# Patient Record
Sex: MALE | Race: WHITE | NOT HISPANIC OR LATINO | Employment: OTHER | URBAN - METROPOLITAN AREA
[De-identification: names, ages, dates, MRNs, and addresses within clinical notes are randomized per-mention and may not be internally consistent; named-entity substitution may affect disease eponyms.]

---

## 2017-06-24 ENCOUNTER — HOSPITAL ENCOUNTER (INPATIENT)
Facility: HOSPITAL | Age: 52
LOS: 16 days | Discharge: HOME | DRG: 175 | End: 2017-07-10
Attending: EMERGENCY MEDICINE | Admitting: ANESTHESIOLOGY

## 2017-06-24 ENCOUNTER — APPOINTMENT (INPATIENT)
Dept: CT IMAGING | Facility: HOSPITAL | Age: 52
DRG: 175 | End: 2017-06-24

## 2017-06-24 ENCOUNTER — APPOINTMENT (EMERGENCY)
Dept: CT IMAGING | Facility: HOSPITAL | Age: 52
DRG: 175 | End: 2017-06-24

## 2017-06-24 ENCOUNTER — APPOINTMENT (EMERGENCY)
Dept: RADIOLOGY | Facility: HOSPITAL | Age: 52
DRG: 175 | End: 2017-06-24

## 2017-06-24 DIAGNOSIS — F10.10 ETOH ABUSE: ICD-10-CM

## 2017-06-24 DIAGNOSIS — E87.6 HYPOKALEMIA: ICD-10-CM

## 2017-06-24 DIAGNOSIS — J18.9 ATYPICAL PNEUMONIA: ICD-10-CM

## 2017-06-24 DIAGNOSIS — D64.9 ANEMIA: ICD-10-CM

## 2017-06-24 DIAGNOSIS — F10.231 ALCOHOL WITHDRAWAL SYNDROME, WITH DELIRIUM (HCC): ICD-10-CM

## 2017-06-24 DIAGNOSIS — I26.99 PE (PULMONARY THROMBOEMBOLISM) (HCC): ICD-10-CM

## 2017-06-24 DIAGNOSIS — I26.99 PULMONARY EMBOLUS, RIGHT (HCC): ICD-10-CM

## 2017-06-24 DIAGNOSIS — R09.02 HYPOXIA: ICD-10-CM

## 2017-06-24 DIAGNOSIS — R55 SYNCOPE AND COLLAPSE: Primary | ICD-10-CM

## 2017-06-24 LAB
ABO GROUP BLD: NORMAL
ALBUMIN SERPL BCP-MCNC: 2.9 G/DL (ref 3.5–5)
ALP SERPL-CCNC: 249 U/L (ref 46–116)
ALT SERPL W P-5'-P-CCNC: 43 U/L (ref 12–78)
AMMONIA PLAS-SCNC: 28 UMOL/L (ref 11–35)
AMPHETAMINES SERPL QL SCN: NEGATIVE
ANION GAP SERPL CALCULATED.3IONS-SCNC: 12 MMOL/L (ref 4–13)
ANION GAP SERPL CALCULATED.3IONS-SCNC: 15 MMOL/L (ref 4–13)
APTT PPP: 30 SECONDS (ref 23–35)
AST SERPL W P-5'-P-CCNC: 193 U/L (ref 5–45)
BACTERIA UR QL AUTO: ABNORMAL /HPF
BARBITURATES UR QL: NEGATIVE
BASOPHILS # BLD AUTO: 0.02 THOUSANDS/ΜL (ref 0–0.1)
BASOPHILS NFR BLD AUTO: 0 % (ref 0–1)
BENZODIAZ UR QL: NEGATIVE
BILIRUB DIRECT SERPL-MCNC: 0.76 MG/DL (ref 0–0.2)
BILIRUB SERPL-MCNC: 1.4 MG/DL (ref 0.2–1)
BILIRUB UR QL STRIP: ABNORMAL
BLD GP AB SCN SERPL QL: NEGATIVE
BLD SMEAR INTERP: NORMAL
BUN SERPL-MCNC: 12 MG/DL (ref 5–25)
BUN SERPL-MCNC: 12 MG/DL (ref 5–25)
CALCIUM SERPL-MCNC: 7.7 MG/DL (ref 8.3–10.1)
CALCIUM SERPL-MCNC: 8.6 MG/DL (ref 8.3–10.1)
CHLORIDE SERPL-SCNC: 92 MMOL/L (ref 100–108)
CHLORIDE SERPL-SCNC: 97 MMOL/L (ref 100–108)
CLARITY UR: CLEAR
CO2 SERPL-SCNC: 26 MMOL/L (ref 21–32)
CO2 SERPL-SCNC: 31 MMOL/L (ref 21–32)
COCAINE UR QL: NEGATIVE
COLOR UR: YELLOW
CREAT SERPL-MCNC: 0.74 MG/DL (ref 0.6–1.3)
CREAT SERPL-MCNC: 0.9 MG/DL (ref 0.6–1.3)
EOSINOPHIL # BLD AUTO: 0.02 THOUSAND/ΜL (ref 0–0.61)
EOSINOPHIL NFR BLD AUTO: 0 % (ref 0–6)
ERYTHROCYTE [DISTWIDTH] IN BLOOD BY AUTOMATED COUNT: 28.4 % (ref 11.6–15.1)
ETHANOL SERPL-MCNC: 197 MG/DL (ref 0–3)
FIBRINOGEN PPP-MCNC: 437 MG/DL (ref 227–495)
GFR SERPL CREATININE-BSD FRML MDRD: >60 ML/MIN/1.73SQ M
GFR SERPL CREATININE-BSD FRML MDRD: >60 ML/MIN/1.73SQ M
GLUCOSE SERPL-MCNC: 108 MG/DL (ref 65–140)
GLUCOSE SERPL-MCNC: 111 MG/DL (ref 65–140)
GLUCOSE SERPL-MCNC: 112 MG/DL (ref 65–140)
GLUCOSE SERPL-MCNC: 114 MG/DL (ref 65–140)
GLUCOSE UR STRIP-MCNC: NEGATIVE MG/DL
HCT VFR BLD AUTO: 17.5 % (ref 36.5–49.3)
HGB BLD-MCNC: 5.8 G/DL (ref 12–17)
HGB BLD-MCNC: 7.7 G/DL (ref 12–17)
HGB UR QL STRIP.AUTO: NEGATIVE
HYALINE CASTS #/AREA URNS LPF: ABNORMAL /LPF
INR PPP: 1 (ref 0.86–1.16)
KETONES UR STRIP-MCNC: ABNORMAL MG/DL
LACTATE SERPL-SCNC: 1.9 MMOL/L (ref 0.5–2)
LACTATE SERPL-SCNC: 2.4 MMOL/L (ref 0.5–2)
LDH SERPL-CCNC: 292 U/L (ref 81–234)
LEUKOCYTE ESTERASE UR QL STRIP: NEGATIVE
LIPASE SERPL-CCNC: 438 U/L (ref 73–393)
LYMPHOCYTES # BLD AUTO: 1.02 THOUSANDS/ΜL (ref 0.6–4.47)
LYMPHOCYTES NFR BLD AUTO: 14 % (ref 14–44)
MAGNESIUM SERPL-MCNC: 1 MG/DL (ref 1.6–2.6)
MAGNESIUM SERPL-MCNC: 2.5 MG/DL (ref 1.6–2.6)
MCH RBC QN AUTO: 27.9 PG (ref 26.8–34.3)
MCHC RBC AUTO-ENTMCNC: 33.1 G/DL (ref 31.4–37.4)
MCV RBC AUTO: 84 FL (ref 82–98)
METHADONE UR QL: NEGATIVE
MONOCYTES # BLD AUTO: 0.91 THOUSAND/ΜL (ref 0.17–1.22)
MONOCYTES NFR BLD AUTO: 12 % (ref 4–12)
NEUTROPHILS # BLD AUTO: 5.4 THOUSANDS/ΜL (ref 1.85–7.62)
NEUTS SEG NFR BLD AUTO: 72 % (ref 43–75)
NITRITE UR QL STRIP: NEGATIVE
NON-SQ EPI CELLS URNS QL MICRO: ABNORMAL /HPF
NRBC BLD AUTO-RTO: 1 /100 WBCS
NT-PROBNP SERPL-MCNC: 417 PG/ML
OPIATES UR QL SCN: NEGATIVE
PCP UR QL: NEGATIVE
PH UR STRIP.AUTO: 6.5 [PH] (ref 4.5–8)
PHOSPHATE SERPL-MCNC: 4 MG/DL (ref 2.7–4.5)
PLATELET # BLD AUTO: 280 THOUSANDS/UL (ref 149–390)
PMV BLD AUTO: 11.4 FL (ref 8.9–12.7)
POTASSIUM SERPL-SCNC: 2.4 MMOL/L (ref 3.5–5.3)
POTASSIUM SERPL-SCNC: 2.9 MMOL/L (ref 3.5–5.3)
PROT SERPL-MCNC: 6.9 G/DL (ref 6.4–8.2)
PROT UR STRIP-MCNC: ABNORMAL MG/DL
PROTHROMBIN TIME: 13.4 SECONDS (ref 12.1–14.4)
RBC # BLD AUTO: 2.08 MILLION/UL (ref 3.88–5.62)
RBC #/AREA URNS AUTO: ABNORMAL /HPF
RH BLD: POSITIVE
SODIUM SERPL-SCNC: 135 MMOL/L (ref 136–145)
SODIUM SERPL-SCNC: 138 MMOL/L (ref 136–145)
SP GR UR STRIP.AUTO: <=1.005 (ref 1–1.03)
SPECIMEN EXPIRATION DATE: NORMAL
THC UR QL: NEGATIVE
TROPONIN I SERPL-MCNC: 0.02 NG/ML
TROPONIN I SERPL-MCNC: 0.02 NG/ML
TROPONIN I SERPL-MCNC: 0.03 NG/ML
TSH SERPL DL<=0.05 MIU/L-ACNC: 0.9 UIU/ML (ref 0.36–3.74)
UROBILINOGEN UR QL STRIP.AUTO: 4 E.U./DL
WBC # BLD AUTO: 7.5 THOUSAND/UL (ref 4.31–10.16)
WBC #/AREA URNS AUTO: ABNORMAL /HPF

## 2017-06-24 PROCEDURE — 85610 PROTHROMBIN TIME: CPT | Performed by: EMERGENCY MEDICINE

## 2017-06-24 PROCEDURE — 83615 LACTATE (LD) (LDH) ENZYME: CPT | Performed by: NURSE PRACTITIONER

## 2017-06-24 PROCEDURE — 83605 ASSAY OF LACTIC ACID: CPT | Performed by: NURSE PRACTITIONER

## 2017-06-24 PROCEDURE — 84100 ASSAY OF PHOSPHORUS: CPT | Performed by: EMERGENCY MEDICINE

## 2017-06-24 PROCEDURE — 83735 ASSAY OF MAGNESIUM: CPT | Performed by: EMERGENCY MEDICINE

## 2017-06-24 PROCEDURE — 96360 HYDRATION IV INFUSION INIT: CPT

## 2017-06-24 PROCEDURE — 86850 RBC ANTIBODY SCREEN: CPT | Performed by: EMERGENCY MEDICINE

## 2017-06-24 PROCEDURE — 84484 ASSAY OF TROPONIN QUANT: CPT | Performed by: NURSE PRACTITIONER

## 2017-06-24 PROCEDURE — 82948 REAGENT STRIP/BLOOD GLUCOSE: CPT

## 2017-06-24 PROCEDURE — 80307 DRUG TEST PRSMV CHEM ANLYZR: CPT | Performed by: EMERGENCY MEDICINE

## 2017-06-24 PROCEDURE — 71275 CT ANGIOGRAPHY CHEST: CPT

## 2017-06-24 PROCEDURE — 83880 ASSAY OF NATRIURETIC PEPTIDE: CPT | Performed by: EMERGENCY MEDICINE

## 2017-06-24 PROCEDURE — 81001 URINALYSIS AUTO W/SCOPE: CPT | Performed by: EMERGENCY MEDICINE

## 2017-06-24 PROCEDURE — 84484 ASSAY OF TROPONIN QUANT: CPT | Performed by: EMERGENCY MEDICINE

## 2017-06-24 PROCEDURE — 85025 COMPLETE CBC W/AUTO DIFF WBC: CPT | Performed by: EMERGENCY MEDICINE

## 2017-06-24 PROCEDURE — 85384 FIBRINOGEN ACTIVITY: CPT | Performed by: EMERGENCY MEDICINE

## 2017-06-24 PROCEDURE — 83690 ASSAY OF LIPASE: CPT | Performed by: NURSE PRACTITIONER

## 2017-06-24 PROCEDURE — 86900 BLOOD TYPING SEROLOGIC ABO: CPT | Performed by: EMERGENCY MEDICINE

## 2017-06-24 PROCEDURE — 86920 COMPATIBILITY TEST SPIN: CPT

## 2017-06-24 PROCEDURE — 86901 BLOOD TYPING SEROLOGIC RH(D): CPT | Performed by: EMERGENCY MEDICINE

## 2017-06-24 PROCEDURE — 70450 CT HEAD/BRAIN W/O DYE: CPT

## 2017-06-24 PROCEDURE — 82140 ASSAY OF AMMONIA: CPT | Performed by: EMERGENCY MEDICINE

## 2017-06-24 PROCEDURE — 84443 ASSAY THYROID STIM HORMONE: CPT | Performed by: EMERGENCY MEDICINE

## 2017-06-24 PROCEDURE — 82248 BILIRUBIN DIRECT: CPT | Performed by: EMERGENCY MEDICINE

## 2017-06-24 PROCEDURE — 80320 DRUG SCREEN QUANTALCOHOLS: CPT | Performed by: EMERGENCY MEDICINE

## 2017-06-24 PROCEDURE — 94664 DEMO&/EVAL PT USE INHALER: CPT

## 2017-06-24 PROCEDURE — 87040 BLOOD CULTURE FOR BACTERIA: CPT | Performed by: EMERGENCY MEDICINE

## 2017-06-24 PROCEDURE — 99285 EMERGENCY DEPT VISIT HI MDM: CPT

## 2017-06-24 PROCEDURE — 71020 HB CHEST X-RAY 2VW FRONTAL&LATL: CPT

## 2017-06-24 PROCEDURE — 93005 ELECTROCARDIOGRAM TRACING: CPT | Performed by: EMERGENCY MEDICINE

## 2017-06-24 PROCEDURE — 93005 ELECTROCARDIOGRAM TRACING: CPT | Performed by: NURSE PRACTITIONER

## 2017-06-24 PROCEDURE — 36430 TRANSFUSION BLD/BLD COMPNT: CPT

## 2017-06-24 PROCEDURE — 80048 BASIC METABOLIC PNL TOTAL CA: CPT | Performed by: NURSE PRACTITIONER

## 2017-06-24 PROCEDURE — 74177 CT ABD & PELVIS W/CONTRAST: CPT

## 2017-06-24 PROCEDURE — 85730 THROMBOPLASTIN TIME PARTIAL: CPT | Performed by: EMERGENCY MEDICINE

## 2017-06-24 PROCEDURE — 80053 COMPREHEN METABOLIC PANEL: CPT | Performed by: EMERGENCY MEDICINE

## 2017-06-24 PROCEDURE — 83735 ASSAY OF MAGNESIUM: CPT | Performed by: NURSE PRACTITIONER

## 2017-06-24 PROCEDURE — 94760 N-INVAS EAR/PLS OXIMETRY 1: CPT

## 2017-06-24 PROCEDURE — 83605 ASSAY OF LACTIC ACID: CPT | Performed by: EMERGENCY MEDICINE

## 2017-06-24 PROCEDURE — P9021 RED BLOOD CELLS UNIT: HCPCS

## 2017-06-24 PROCEDURE — 36415 COLL VENOUS BLD VENIPUNCTURE: CPT | Performed by: EMERGENCY MEDICINE

## 2017-06-24 PROCEDURE — 85018 HEMOGLOBIN: CPT | Performed by: NURSE PRACTITIONER

## 2017-06-24 RX ORDER — LORAZEPAM 2 MG/ML
1 INJECTION INTRAMUSCULAR EVERY 4 HOURS PRN
Status: DISCONTINUED | OUTPATIENT
Start: 2017-06-24 | End: 2017-07-10 | Stop reason: HOSPADM

## 2017-06-24 RX ORDER — GUAIFENESIN 600 MG
600 TABLET, EXTENDED RELEASE 12 HR ORAL EVERY 12 HOURS SCHEDULED
COMMUNITY
End: 2017-07-10 | Stop reason: HOSPADM

## 2017-06-24 RX ORDER — NICOTINE 21 MG/24HR
21 PATCH, TRANSDERMAL 24 HOURS TRANSDERMAL DAILY
Status: DISCONTINUED | OUTPATIENT
Start: 2017-06-24 | End: 2017-07-10 | Stop reason: HOSPADM

## 2017-06-24 RX ORDER — LORAZEPAM 2 MG/ML
1.5 INJECTION INTRAMUSCULAR EVERY 4 HOURS PRN
Status: DISCONTINUED | OUTPATIENT
Start: 2017-06-24 | End: 2017-07-10 | Stop reason: HOSPADM

## 2017-06-24 RX ORDER — GUAIFENESIN 600 MG
600 TABLET, EXTENDED RELEASE 12 HR ORAL EVERY 12 HOURS SCHEDULED
Status: DISCONTINUED | OUTPATIENT
Start: 2017-06-24 | End: 2017-07-10 | Stop reason: HOSPADM

## 2017-06-24 RX ORDER — LEVALBUTEROL 1.25 MG/.5ML
1.25 SOLUTION, CONCENTRATE RESPIRATORY (INHALATION)
Status: DISCONTINUED | OUTPATIENT
Start: 2017-06-24 | End: 2017-07-05

## 2017-06-24 RX ORDER — ALBUTEROL SULFATE 2.5 MG/3ML
2.5 SOLUTION RESPIRATORY (INHALATION) ONCE
Status: COMPLETED | OUTPATIENT
Start: 2017-06-24 | End: 2017-06-24

## 2017-06-24 RX ORDER — PANTOPRAZOLE SODIUM 40 MG/1
40 INJECTION, POWDER, FOR SOLUTION INTRAVENOUS
Status: DISCONTINUED | OUTPATIENT
Start: 2017-06-25 | End: 2017-06-25

## 2017-06-24 RX ORDER — SODIUM CHLORIDE 9 MG/ML
125 INJECTION, SOLUTION INTRAVENOUS CONTINUOUS
Status: DISCONTINUED | OUTPATIENT
Start: 2017-06-24 | End: 2017-06-26

## 2017-06-24 RX ORDER — LORAZEPAM 2 MG/ML
2 INJECTION INTRAMUSCULAR EVERY 4 HOURS PRN
Status: DISCONTINUED | OUTPATIENT
Start: 2017-06-24 | End: 2017-07-10 | Stop reason: HOSPADM

## 2017-06-24 RX ORDER — POTASSIUM CHLORIDE 14.9 MG/ML
20 INJECTION INTRAVENOUS
Status: COMPLETED | OUTPATIENT
Start: 2017-06-24 | End: 2017-06-24

## 2017-06-24 RX ORDER — MAGNESIUM SULFATE HEPTAHYDRATE 40 MG/ML
4 INJECTION, SOLUTION INTRAVENOUS ONCE
Status: COMPLETED | OUTPATIENT
Start: 2017-06-24 | End: 2017-06-24

## 2017-06-24 RX ORDER — POTASSIUM CHLORIDE 14.9 MG/ML
20 INJECTION INTRAVENOUS ONCE
Status: COMPLETED | OUTPATIENT
Start: 2017-06-25 | End: 2017-06-25

## 2017-06-24 RX ORDER — POTASSIUM CHLORIDE 29.8 MG/ML
40 INJECTION INTRAVENOUS ONCE
Status: DISCONTINUED | OUTPATIENT
Start: 2017-06-24 | End: 2017-06-24

## 2017-06-24 RX ADMIN — IPRATROPIUM BROMIDE 0.5 MG: 0.5 SOLUTION RESPIRATORY (INHALATION) at 17:23

## 2017-06-24 RX ADMIN — ALBUTEROL SULFATE 2.5 MG: 2.5 SOLUTION RESPIRATORY (INHALATION) at 13:32

## 2017-06-24 RX ADMIN — CHLORDIAZEPOXIDE HYDROCHLORIDE 60 MG: 5 CAPSULE ORAL at 23:43

## 2017-06-24 RX ADMIN — IOHEXOL 100 ML: 350 INJECTION, SOLUTION INTRAVENOUS at 14:53

## 2017-06-24 RX ADMIN — CHLORDIAZEPOXIDE HYDROCHLORIDE 60 MG: 5 CAPSULE ORAL at 19:41

## 2017-06-24 RX ADMIN — LEVALBUTEROL HYDROCHLORIDE 1.25 MG: 1.25 SOLUTION, CONCENTRATE RESPIRATORY (INHALATION) at 21:10

## 2017-06-24 RX ADMIN — SODIUM CHLORIDE 1000 ML: 0.9 INJECTION, SOLUTION INTRAVENOUS at 15:19

## 2017-06-24 RX ADMIN — SODIUM CHLORIDE 125 ML/HR: 0.9 INJECTION, SOLUTION INTRAVENOUS at 16:42

## 2017-06-24 RX ADMIN — IPRATROPIUM BROMIDE 0.5 MG: 0.5 SOLUTION RESPIRATORY (INHALATION) at 13:32

## 2017-06-24 RX ADMIN — AZITHROMYCIN MONOHYDRATE 500 MG: 500 INJECTION, POWDER, LYOPHILIZED, FOR SOLUTION INTRAVENOUS at 16:35

## 2017-06-24 RX ADMIN — SODIUM CHLORIDE 1000 ML: 0.9 INJECTION, SOLUTION INTRAVENOUS at 13:30

## 2017-06-24 RX ADMIN — MAGNESIUM SULFATE HEPTAHYDRATE 4 G: 40 INJECTION, SOLUTION INTRAVENOUS at 20:00

## 2017-06-24 RX ADMIN — POTASSIUM CHLORIDE 20 MEQ: 200 INJECTION, SOLUTION INTRAVENOUS at 17:18

## 2017-06-24 RX ADMIN — SODIUM CHLORIDE 125 ML/HR: 0.9 INJECTION, SOLUTION INTRAVENOUS at 23:45

## 2017-06-24 RX ADMIN — IPRATROPIUM BROMIDE 0.5 MG: 0.5 SOLUTION RESPIRATORY (INHALATION) at 21:11

## 2017-06-24 RX ADMIN — NICOTINE 21 MG: 21 PATCH, EXTENDED RELEASE TRANSDERMAL at 19:56

## 2017-06-24 RX ADMIN — LEVALBUTEROL HYDROCHLORIDE 1.25 MG: 1.25 SOLUTION, CONCENTRATE RESPIRATORY (INHALATION) at 17:22

## 2017-06-24 RX ADMIN — GUAIFENESIN 600 MG: 600 TABLET, EXTENDED RELEASE ORAL at 20:12

## 2017-06-24 RX ADMIN — POTASSIUM CHLORIDE 20 MEQ: 200 INJECTION, SOLUTION INTRAVENOUS at 15:15

## 2017-06-25 ENCOUNTER — GENERIC CONVERSION - ENCOUNTER (OUTPATIENT)
Dept: OTHER | Facility: OTHER | Age: 52
End: 2017-06-25

## 2017-06-25 ENCOUNTER — ANESTHESIA EVENT (INPATIENT)
Dept: PERIOP | Facility: HOSPITAL | Age: 52
DRG: 175 | End: 2017-06-25

## 2017-06-25 ENCOUNTER — ANESTHESIA (INPATIENT)
Dept: PERIOP | Facility: HOSPITAL | Age: 52
DRG: 175 | End: 2017-06-25

## 2017-06-25 LAB
ABO GROUP BLD BPU: NORMAL
ABO GROUP BLD BPU: NORMAL
ALBUMIN SERPL BCP-MCNC: 2.4 G/DL (ref 3.5–5)
ALP SERPL-CCNC: 237 U/L (ref 46–116)
ALT SERPL W P-5'-P-CCNC: 37 U/L (ref 12–78)
ANION GAP SERPL CALCULATED.3IONS-SCNC: 13 MMOL/L (ref 4–13)
APTT PPP: 21 SECONDS (ref 23–35)
APTT PPP: 38 SECONDS (ref 23–35)
AST SERPL W P-5'-P-CCNC: 186 U/L (ref 5–45)
BASOPHILS # BLD AUTO: 0.02 THOUSANDS/ΜL (ref 0–0.1)
BASOPHILS NFR BLD AUTO: 0 % (ref 0–1)
BILIRUB DIRECT SERPL-MCNC: 0.95 MG/DL (ref 0–0.2)
BILIRUB SERPL-MCNC: 2 MG/DL (ref 0.2–1)
BPU ID: NORMAL
BPU ID: NORMAL
BUN SERPL-MCNC: 12 MG/DL (ref 5–25)
CA-I BLD-SCNC: 0.94 MMOL/L (ref 1.12–1.32)
CALCIUM SERPL-MCNC: 7 MG/DL (ref 8.3–10.1)
CHLORIDE SERPL-SCNC: 100 MMOL/L (ref 100–108)
CO2 SERPL-SCNC: 26 MMOL/L (ref 21–32)
CREAT SERPL-MCNC: 0.72 MG/DL (ref 0.6–1.3)
CROSSMATCH: NORMAL
CROSSMATCH: NORMAL
EOSINOPHIL # BLD AUTO: 0.02 THOUSAND/ΜL (ref 0–0.61)
EOSINOPHIL NFR BLD AUTO: 0 % (ref 0–6)
ERYTHROCYTE [DISTWIDTH] IN BLOOD BY AUTOMATED COUNT: 24.5 % (ref 11.6–15.1)
EST. AVERAGE GLUCOSE BLD GHB EST-MCNC: 108 MG/DL
GFR SERPL CREATININE-BSD FRML MDRD: >60 ML/MIN/1.73SQ M
GLUCOSE SERPL-MCNC: 111 MG/DL (ref 65–140)
HBA1C MFR BLD: 5.4 % (ref 4.2–6.3)
HCT VFR BLD AUTO: 22.4 % (ref 36.5–49.3)
HGB BLD-MCNC: 7.5 G/DL (ref 12–17)
INR PPP: 1.05 (ref 0.86–1.16)
LYMPHOCYTES # BLD AUTO: 1.07 THOUSANDS/ΜL (ref 0.6–4.47)
LYMPHOCYTES NFR BLD AUTO: 13 % (ref 14–44)
MAGNESIUM SERPL-MCNC: 1.6 MG/DL (ref 1.6–2.6)
MCH RBC QN AUTO: 28.3 PG (ref 26.8–34.3)
MCHC RBC AUTO-ENTMCNC: 33.5 G/DL (ref 31.4–37.4)
MCV RBC AUTO: 85 FL (ref 82–98)
MONOCYTES # BLD AUTO: 0.73 THOUSAND/ΜL (ref 0.17–1.22)
MONOCYTES NFR BLD AUTO: 9 % (ref 4–12)
NEUTROPHILS # BLD AUTO: 6.32 THOUSANDS/ΜL (ref 1.85–7.62)
NEUTS SEG NFR BLD AUTO: 77 % (ref 43–75)
NRBC BLD AUTO-RTO: 1 /100 WBCS
PHOSPHATE SERPL-MCNC: 2.7 MG/DL (ref 2.7–4.5)
PLATELET # BLD AUTO: 229 THOUSANDS/UL (ref 149–390)
PMV BLD AUTO: 11.7 FL (ref 8.9–12.7)
POTASSIUM SERPL-SCNC: 3.1 MMOL/L (ref 3.5–5.3)
PROT SERPL-MCNC: 5.7 G/DL (ref 6.4–8.2)
PROTHROMBIN TIME: 13.9 SECONDS (ref 12.1–14.4)
RBC # BLD AUTO: 2.65 MILLION/UL (ref 3.88–5.62)
SODIUM SERPL-SCNC: 139 MMOL/L (ref 136–145)
UNIT DISPENSE STATUS: NORMAL
UNIT DISPENSE STATUS: NORMAL
UNIT PRODUCT CODE: NORMAL
UNIT PRODUCT CODE: NORMAL
UNIT RH: NORMAL
UNIT RH: NORMAL
WBC # BLD AUTO: 8.22 THOUSAND/UL (ref 4.31–10.16)

## 2017-06-25 PROCEDURE — 83010 ASSAY OF HAPTOGLOBIN QUANT: CPT | Performed by: NURSE PRACTITIONER

## 2017-06-25 PROCEDURE — 82330 ASSAY OF CALCIUM: CPT | Performed by: NURSE PRACTITIONER

## 2017-06-25 PROCEDURE — 94760 N-INVAS EAR/PLS OXIMETRY 1: CPT

## 2017-06-25 PROCEDURE — 80053 COMPREHEN METABOLIC PANEL: CPT | Performed by: NURSE PRACTITIONER

## 2017-06-25 PROCEDURE — 83735 ASSAY OF MAGNESIUM: CPT | Performed by: NURSE PRACTITIONER

## 2017-06-25 PROCEDURE — 85025 COMPLETE CBC W/AUTO DIFF WBC: CPT | Performed by: NURSE PRACTITIONER

## 2017-06-25 PROCEDURE — C9113 INJ PANTOPRAZOLE SODIUM, VIA: HCPCS | Performed by: NURSE PRACTITIONER

## 2017-06-25 PROCEDURE — 0DB68ZX EXCISION OF STOMACH, VIA NATURAL OR ARTIFICIAL OPENING ENDOSCOPIC, DIAGNOSTIC: ICD-10-PCS | Performed by: INTERNAL MEDICINE

## 2017-06-25 PROCEDURE — 82248 BILIRUBIN DIRECT: CPT | Performed by: ANESTHESIOLOGY

## 2017-06-25 PROCEDURE — 85730 THROMBOPLASTIN TIME PARTIAL: CPT | Performed by: NURSE PRACTITIONER

## 2017-06-25 PROCEDURE — 94640 AIRWAY INHALATION TREATMENT: CPT

## 2017-06-25 PROCEDURE — 88342 IMHCHEM/IMCYTCHM 1ST ANTB: CPT | Performed by: INTERNAL MEDICINE

## 2017-06-25 PROCEDURE — 84100 ASSAY OF PHOSPHORUS: CPT | Performed by: NURSE PRACTITIONER

## 2017-06-25 PROCEDURE — 0DB58ZX EXCISION OF ESOPHAGUS, VIA NATURAL OR ARTIFICIAL OPENING ENDOSCOPIC, DIAGNOSTIC: ICD-10-PCS | Performed by: INTERNAL MEDICINE

## 2017-06-25 PROCEDURE — 83036 HEMOGLOBIN GLYCOSYLATED A1C: CPT | Performed by: NURSE PRACTITIONER

## 2017-06-25 PROCEDURE — 88305 TISSUE EXAM BY PATHOLOGIST: CPT | Performed by: INTERNAL MEDICINE

## 2017-06-25 PROCEDURE — 85610 PROTHROMBIN TIME: CPT | Performed by: NURSE PRACTITIONER

## 2017-06-25 PROCEDURE — 30233N1 TRANSFUSION OF NONAUTOLOGOUS RED BLOOD CELLS INTO PERIPHERAL VEIN, PERCUTANEOUS APPROACH: ICD-10-PCS | Performed by: INTERNAL MEDICINE

## 2017-06-25 RX ORDER — POTASSIUM CHLORIDE 14.9 MG/ML
20 INJECTION INTRAVENOUS
Status: COMPLETED | OUTPATIENT
Start: 2017-06-25 | End: 2017-06-25

## 2017-06-25 RX ORDER — LORAZEPAM 2 MG/ML
1 INJECTION INTRAMUSCULAR ONCE
Status: COMPLETED | OUTPATIENT
Start: 2017-06-25 | End: 2017-06-25

## 2017-06-25 RX ORDER — HEPARIN SODIUM 10000 [USP'U]/100ML
3-20 INJECTION, SOLUTION INTRAVENOUS
Status: DISCONTINUED | OUTPATIENT
Start: 2017-06-25 | End: 2017-07-03

## 2017-06-25 RX ORDER — IPRATROPIUM BROMIDE AND ALBUTEROL SULFATE 2.5; .5 MG/3ML; MG/3ML
3 SOLUTION RESPIRATORY (INHALATION) ONCE
Status: CANCELLED | OUTPATIENT
Start: 2017-06-25

## 2017-06-25 RX ORDER — PROPOFOL 10 MG/ML
INJECTION, EMULSION INTRAVENOUS AS NEEDED
Status: DISCONTINUED | OUTPATIENT
Start: 2017-06-25 | End: 2017-06-25 | Stop reason: SURG

## 2017-06-25 RX ORDER — WARFARIN SODIUM 2 MG/1
2 TABLET ORAL
Status: COMPLETED | OUTPATIENT
Start: 2017-06-25 | End: 2017-06-25

## 2017-06-25 RX ORDER — IPRATROPIUM BROMIDE AND ALBUTEROL SULFATE 2.5; .5 MG/3ML; MG/3ML
3 SOLUTION RESPIRATORY (INHALATION) ONCE
Status: COMPLETED | OUTPATIENT
Start: 2017-06-25 | End: 2017-06-25

## 2017-06-25 RX ORDER — PANTOPRAZOLE SODIUM 40 MG/1
40 INJECTION, POWDER, FOR SOLUTION INTRAVENOUS EVERY 12 HOURS SCHEDULED
Status: DISCONTINUED | OUTPATIENT
Start: 2017-06-25 | End: 2017-07-05

## 2017-06-25 RX ORDER — HEPARIN SODIUM 1000 [USP'U]/ML
4000 INJECTION, SOLUTION INTRAVENOUS; SUBCUTANEOUS AS NEEDED
Status: DISCONTINUED | OUTPATIENT
Start: 2017-06-25 | End: 2017-07-03

## 2017-06-25 RX ORDER — HEPARIN SODIUM 1000 [USP'U]/ML
2000 INJECTION, SOLUTION INTRAVENOUS; SUBCUTANEOUS AS NEEDED
Status: DISCONTINUED | OUTPATIENT
Start: 2017-06-25 | End: 2017-07-03

## 2017-06-25 RX ORDER — MAGNESIUM SULFATE HEPTAHYDRATE 40 MG/ML
2 INJECTION, SOLUTION INTRAVENOUS ONCE
Status: COMPLETED | OUTPATIENT
Start: 2017-06-25 | End: 2017-06-25

## 2017-06-25 RX ADMIN — POTASSIUM CHLORIDE 20 MEQ: 200 INJECTION, SOLUTION INTRAVENOUS at 07:40

## 2017-06-25 RX ADMIN — MAGNESIUM SULFATE HEPTAHYDRATE 2 G: 40 INJECTION, SOLUTION INTRAVENOUS at 07:40

## 2017-06-25 RX ADMIN — SODIUM CHLORIDE 125 ML/HR: 0.9 INJECTION, SOLUTION INTRAVENOUS at 23:28

## 2017-06-25 RX ADMIN — POTASSIUM CHLORIDE 20 MEQ: 200 INJECTION, SOLUTION INTRAVENOUS at 00:41

## 2017-06-25 RX ADMIN — LEVALBUTEROL HYDROCHLORIDE 1.25 MG: 1.25 SOLUTION, CONCENTRATE RESPIRATORY (INHALATION) at 21:00

## 2017-06-25 RX ADMIN — POTASSIUM CHLORIDE 20 MEQ: 200 INJECTION, SOLUTION INTRAVENOUS at 08:50

## 2017-06-25 RX ADMIN — CHLORDIAZEPOXIDE HYDROCHLORIDE 60 MG: 5 CAPSULE ORAL at 06:05

## 2017-06-25 RX ADMIN — CHLORDIAZEPOXIDE HYDROCHLORIDE 60 MG: 5 CAPSULE ORAL at 17:13

## 2017-06-25 RX ADMIN — WARFARIN SODIUM 2 MG: 2 TABLET ORAL at 17:14

## 2017-06-25 RX ADMIN — HEPARIN SODIUM AND DEXTROSE 12 UNITS/KG/HR: 10000; 5 INJECTION INTRAVENOUS at 15:33

## 2017-06-25 RX ADMIN — CHLORDIAZEPOXIDE HYDROCHLORIDE 60 MG: 5 CAPSULE ORAL at 11:53

## 2017-06-25 RX ADMIN — LORAZEPAM 1 MG: 2 INJECTION INTRAMUSCULAR; INTRAVENOUS at 12:27

## 2017-06-25 RX ADMIN — NICOTINE 21 MG: 21 PATCH, EXTENDED RELEASE TRANSDERMAL at 08:50

## 2017-06-25 RX ADMIN — LORAZEPAM 1 MG: 2 INJECTION INTRAMUSCULAR; INTRAVENOUS at 15:50

## 2017-06-25 RX ADMIN — IPRATROPIUM BROMIDE 0.5 MG: 0.5 SOLUTION RESPIRATORY (INHALATION) at 21:00

## 2017-06-25 RX ADMIN — IPRATROPIUM BROMIDE AND ALBUTEROL SULFATE 3 ML: .5; 3 SOLUTION RESPIRATORY (INHALATION) at 11:21

## 2017-06-25 RX ADMIN — LORAZEPAM 1 MG: 2 INJECTION INTRAMUSCULAR; INTRAVENOUS at 05:42

## 2017-06-25 RX ADMIN — CALCIUM GLUCONATE 1 G: 94 INJECTION, SOLUTION INTRAVENOUS at 07:47

## 2017-06-25 RX ADMIN — LEVALBUTEROL HYDROCHLORIDE 1.25 MG: 1.25 SOLUTION, CONCENTRATE RESPIRATORY (INHALATION) at 07:45

## 2017-06-25 RX ADMIN — LORAZEPAM 1 MG: 2 INJECTION INTRAMUSCULAR; INTRAVENOUS at 15:40

## 2017-06-25 RX ADMIN — IPRATROPIUM BROMIDE 0.5 MG: 0.5 SOLUTION RESPIRATORY (INHALATION) at 13:28

## 2017-06-25 RX ADMIN — PANTOPRAZOLE SODIUM 40 MG: 40 INJECTION, POWDER, FOR SOLUTION INTRAVENOUS at 21:00

## 2017-06-25 RX ADMIN — POTASSIUM CHLORIDE 20 MEQ: 200 INJECTION, SOLUTION INTRAVENOUS at 11:15

## 2017-06-25 RX ADMIN — PROPOFOL 20 MG: 10 INJECTION, EMULSION INTRAVENOUS at 10:57

## 2017-06-25 RX ADMIN — LORAZEPAM 2 MG: 2 INJECTION INTRAMUSCULAR; INTRAVENOUS at 20:26

## 2017-06-25 RX ADMIN — LEVALBUTEROL HYDROCHLORIDE 1.25 MG: 1.25 SOLUTION, CONCENTRATE RESPIRATORY (INHALATION) at 13:28

## 2017-06-25 RX ADMIN — PROPOFOL 100 MG: 10 INJECTION, EMULSION INTRAVENOUS at 10:53

## 2017-06-25 RX ADMIN — IPRATROPIUM BROMIDE 0.5 MG: 0.5 SOLUTION RESPIRATORY (INHALATION) at 07:45

## 2017-06-25 RX ADMIN — SODIUM CHLORIDE: 0.9 INJECTION, SOLUTION INTRAVENOUS at 10:25

## 2017-06-25 RX ADMIN — HEPARIN SODIUM 4000 UNITS: 1000 INJECTION, SOLUTION INTRAVENOUS; SUBCUTANEOUS at 23:08

## 2017-06-25 RX ADMIN — PANTOPRAZOLE SODIUM 40 MG: 40 INJECTION, POWDER, FOR SOLUTION INTRAVENOUS at 08:50

## 2017-06-26 ENCOUNTER — APPOINTMENT (INPATIENT)
Dept: RADIOLOGY | Facility: HOSPITAL | Age: 52
DRG: 175 | End: 2017-06-26

## 2017-06-26 ENCOUNTER — APPOINTMENT (INPATIENT)
Dept: ULTRASOUND IMAGING | Facility: HOSPITAL | Age: 52
DRG: 175 | End: 2017-06-26

## 2017-06-26 ENCOUNTER — APPOINTMENT (INPATIENT)
Dept: NON INVASIVE DIAGNOSTICS | Facility: HOSPITAL | Age: 52
DRG: 175 | End: 2017-06-26

## 2017-06-26 LAB
ANION GAP SERPL CALCULATED.3IONS-SCNC: 13 MMOL/L (ref 4–13)
ANION GAP SERPL CALCULATED.3IONS-SCNC: 15 MMOL/L (ref 4–13)
APTT PPP: 49 SECONDS (ref 23–35)
APTT PPP: 55 SECONDS (ref 23–35)
ARTERIAL PATENCY WRIST A: YES
ATRIAL RATE: 101 BPM
ATRIAL RATE: 102 BPM
BASE EXCESS BLDA CALC-SCNC: -2.4 MMOL/L
BASOPHILS # BLD AUTO: 0.03 THOUSANDS/ΜL (ref 0–0.1)
BASOPHILS NFR BLD AUTO: 0 % (ref 0–1)
BODY TEMPERATURE: 99.4 DEGREES FEHRENHEIT
BUN SERPL-MCNC: 8 MG/DL (ref 5–25)
BUN SERPL-MCNC: 8 MG/DL (ref 5–25)
CALCIUM SERPL-MCNC: 7.3 MG/DL (ref 8.3–10.1)
CALCIUM SERPL-MCNC: 7.6 MG/DL (ref 8.3–10.1)
CHLORIDE SERPL-SCNC: 101 MMOL/L (ref 100–108)
CHLORIDE SERPL-SCNC: 99 MMOL/L (ref 100–108)
CO2 SERPL-SCNC: 24 MMOL/L (ref 21–32)
CO2 SERPL-SCNC: 27 MMOL/L (ref 21–32)
CREAT SERPL-MCNC: 0.72 MG/DL (ref 0.6–1.3)
CREAT SERPL-MCNC: 0.76 MG/DL (ref 0.6–1.3)
CREAT UR-MCNC: 97 MG/DL
EOSINOPHIL # BLD AUTO: 0.02 THOUSAND/ΜL (ref 0–0.61)
EOSINOPHIL NFR BLD AUTO: 0 % (ref 0–6)
ERYTHROCYTE [DISTWIDTH] IN BLOOD BY AUTOMATED COUNT: 24.8 % (ref 11.6–15.1)
GFR SERPL CREATININE-BSD FRML MDRD: >60 ML/MIN/1.73SQ M
GFR SERPL CREATININE-BSD FRML MDRD: >60 ML/MIN/1.73SQ M
GLUCOSE SERPL-MCNC: 106 MG/DL (ref 65–140)
GLUCOSE SERPL-MCNC: 131 MG/DL (ref 65–140)
HCO3 BLDA-SCNC: 21.1 MMOL/L (ref 22–28)
HCT VFR BLD AUTO: 24.1 % (ref 36.5–49.3)
HGB BLD-MCNC: 7.7 G/DL (ref 12–17)
INR PPP: 1.12 (ref 0.86–1.16)
LYMPHOCYTES # BLD AUTO: 1.31 THOUSANDS/ΜL (ref 0.6–4.47)
LYMPHOCYTES NFR BLD AUTO: 10 % (ref 14–44)
MAGNESIUM SERPL-MCNC: 1.2 MG/DL (ref 1.6–2.6)
MAGNESIUM SERPL-MCNC: 1.8 MG/DL (ref 1.6–2.6)
MCH RBC QN AUTO: 27.7 PG (ref 26.8–34.3)
MCHC RBC AUTO-ENTMCNC: 32 G/DL (ref 31.4–37.4)
MCV RBC AUTO: 87 FL (ref 82–98)
MONOCYTES # BLD AUTO: 0.79 THOUSAND/ΜL (ref 0.17–1.22)
MONOCYTES NFR BLD AUTO: 6 % (ref 4–12)
NASAL CANNULA: 6
NEUTROPHILS # BLD AUTO: 11.45 THOUSANDS/ΜL (ref 1.85–7.62)
NEUTS SEG NFR BLD AUTO: 84 % (ref 43–75)
NRBC BLD AUTO-RTO: 0 /100 WBCS
O2 CT BLDA-SCNC: 10.5 ML/DL (ref 16–23)
OXYHGB MFR BLDA: 89.7 % (ref 94–97)
P AXIS: 72 DEGREES
P AXIS: 72 DEGREES
PCO2 BLDA: 31.3 MM HG (ref 36–44)
PH BLDA: 7.45 [PH] (ref 7.35–7.45)
PLATELET # BLD AUTO: 236 THOUSANDS/UL (ref 149–390)
PMV BLD AUTO: 11.3 FL (ref 8.9–12.7)
PO2 BLDA: 63.3 MM HG (ref 75–129)
POTASSIUM SERPL-SCNC: 2.9 MMOL/L (ref 3.5–5.3)
POTASSIUM SERPL-SCNC: 3 MMOL/L (ref 3.5–5.3)
POTASSIUM UR-SCNC: 36.8 MMOL/L (ref 1–300)
PR INTERVAL: 126 MS
PR INTERVAL: 132 MS
PROTHROMBIN TIME: 14.6 SECONDS (ref 12.1–14.4)
QRS AXIS: 48 DEGREES
QRS AXIS: 60 DEGREES
QRSD INTERVAL: 106 MS
QRSD INTERVAL: 98 MS
QT INTERVAL: 372 MS
QT INTERVAL: 386 MS
QTC INTERVAL: 484 MS
QTC INTERVAL: 500 MS
RBC # BLD AUTO: 2.78 MILLION/UL (ref 3.88–5.62)
SODIUM 24H UR-SCNC: 96 MOL/L
SODIUM SERPL-SCNC: 139 MMOL/L (ref 136–145)
SODIUM SERPL-SCNC: 140 MMOL/L (ref 136–145)
SPECIMEN SOURCE: ABNORMAL
T WAVE AXIS: 45 DEGREES
T WAVE AXIS: 57 DEGREES
VENTRICULAR RATE: 101 BPM
VENTRICULAR RATE: 102 BPM
WBC # BLD AUTO: 13.68 THOUSAND/UL (ref 4.31–10.16)

## 2017-06-26 PROCEDURE — C9113 INJ PANTOPRAZOLE SODIUM, VIA: HCPCS | Performed by: NURSE PRACTITIONER

## 2017-06-26 PROCEDURE — 82805 BLOOD GASES W/O2 SATURATION: CPT | Performed by: NURSE PRACTITIONER

## 2017-06-26 PROCEDURE — 84300 ASSAY OF URINE SODIUM: CPT | Performed by: NURSE PRACTITIONER

## 2017-06-26 PROCEDURE — 93306 TTE W/DOPPLER COMPLETE: CPT

## 2017-06-26 PROCEDURE — 82570 ASSAY OF URINE CREATININE: CPT | Performed by: NURSE PRACTITIONER

## 2017-06-26 PROCEDURE — 85025 COMPLETE CBC W/AUTO DIFF WBC: CPT | Performed by: NURSE PRACTITIONER

## 2017-06-26 PROCEDURE — 85730 THROMBOPLASTIN TIME PARTIAL: CPT | Performed by: NURSE PRACTITIONER

## 2017-06-26 PROCEDURE — 36600 WITHDRAWAL OF ARTERIAL BLOOD: CPT

## 2017-06-26 PROCEDURE — 83930 ASSAY OF BLOOD OSMOLALITY: CPT | Performed by: NURSE PRACTITIONER

## 2017-06-26 PROCEDURE — 83935 ASSAY OF URINE OSMOLALITY: CPT | Performed by: NURSE PRACTITIONER

## 2017-06-26 PROCEDURE — 80048 BASIC METABOLIC PNL TOTAL CA: CPT | Performed by: NURSE PRACTITIONER

## 2017-06-26 PROCEDURE — 94640 AIRWAY INHALATION TREATMENT: CPT

## 2017-06-26 PROCEDURE — 93880 EXTRACRANIAL BILAT STUDY: CPT

## 2017-06-26 PROCEDURE — 85610 PROTHROMBIN TIME: CPT | Performed by: NURSE PRACTITIONER

## 2017-06-26 PROCEDURE — 84133 ASSAY OF URINE POTASSIUM: CPT | Performed by: NURSE PRACTITIONER

## 2017-06-26 PROCEDURE — 94760 N-INVAS EAR/PLS OXIMETRY 1: CPT

## 2017-06-26 PROCEDURE — 71010 HB CHEST X-RAY 1 VIEW FRONTAL (PORTABLE): CPT

## 2017-06-26 PROCEDURE — 83735 ASSAY OF MAGNESIUM: CPT | Performed by: NURSE PRACTITIONER

## 2017-06-26 PROCEDURE — 85730 THROMBOPLASTIN TIME PARTIAL: CPT | Performed by: ANESTHESIOLOGY

## 2017-06-26 RX ORDER — POTASSIUM CHLORIDE 14.9 MG/ML
20 INJECTION INTRAVENOUS
Status: COMPLETED | OUTPATIENT
Start: 2017-06-26 | End: 2017-06-26

## 2017-06-26 RX ORDER — MAGNESIUM SULFATE HEPTAHYDRATE 40 MG/ML
2 INJECTION, SOLUTION INTRAVENOUS
Status: COMPLETED | OUTPATIENT
Start: 2017-06-26 | End: 2017-06-26

## 2017-06-26 RX ORDER — FUROSEMIDE 10 MG/ML
20 INJECTION INTRAMUSCULAR; INTRAVENOUS ONCE
Status: COMPLETED | OUTPATIENT
Start: 2017-06-26 | End: 2017-06-26

## 2017-06-26 RX ORDER — MAGNESIUM SULFATE HEPTAHYDRATE 40 MG/ML
2 INJECTION, SOLUTION INTRAVENOUS
Status: COMPLETED | OUTPATIENT
Start: 2017-06-26 | End: 2017-06-28

## 2017-06-26 RX ORDER — POTASSIUM CHLORIDE 14.9 MG/ML
20 INJECTION INTRAVENOUS
Status: COMPLETED | OUTPATIENT
Start: 2017-06-26 | End: 2017-06-28

## 2017-06-26 RX ORDER — POTASSIUM CHLORIDE 29.8 MG/ML
40 INJECTION INTRAVENOUS ONCE
Status: DISCONTINUED | OUTPATIENT
Start: 2017-06-26 | End: 2017-06-26

## 2017-06-26 RX ORDER — POTASSIUM CHLORIDE 20MEQ/15ML
80 LIQUID (ML) ORAL ONCE
Status: DISCONTINUED | OUTPATIENT
Start: 2017-06-26 | End: 2017-06-26

## 2017-06-26 RX ORDER — LORAZEPAM 2 MG/ML
2 INJECTION INTRAMUSCULAR ONCE
Status: DISCONTINUED | OUTPATIENT
Start: 2017-06-26 | End: 2017-07-10 | Stop reason: HOSPADM

## 2017-06-26 RX ORDER — POTASSIUM CHLORIDE 20MEQ/15ML
40 LIQUID (ML) ORAL EVERY 4 HOURS
Status: COMPLETED | OUTPATIENT
Start: 2017-06-26 | End: 2017-06-26

## 2017-06-26 RX ADMIN — POTASSIUM CHLORIDE 40 MEQ: 20 SOLUTION ORAL at 18:37

## 2017-06-26 RX ADMIN — HEPARIN SODIUM 2000 UNITS: 1000 INJECTION, SOLUTION INTRAVENOUS; SUBCUTANEOUS at 18:04

## 2017-06-26 RX ADMIN — LORAZEPAM 2 MG: 2 INJECTION INTRAMUSCULAR; INTRAVENOUS at 00:09

## 2017-06-26 RX ADMIN — POTASSIUM CHLORIDE 20 MEQ: 200 INJECTION, SOLUTION INTRAVENOUS at 21:00

## 2017-06-26 RX ADMIN — LORAZEPAM 2 MG: 2 INJECTION INTRAMUSCULAR; INTRAVENOUS at 02:39

## 2017-06-26 RX ADMIN — MAGNESIUM SULFATE HEPTAHYDRATE 2 G: 40 INJECTION, SOLUTION INTRAVENOUS at 18:45

## 2017-06-26 RX ADMIN — PANTOPRAZOLE SODIUM 40 MG: 40 INJECTION, POWDER, FOR SOLUTION INTRAVENOUS at 21:57

## 2017-06-26 RX ADMIN — FUROSEMIDE 20 MG: 10 INJECTION, SOLUTION INTRAMUSCULAR; INTRAVENOUS at 07:52

## 2017-06-26 RX ADMIN — MAGNESIUM SULFATE HEPTAHYDRATE 2 G: 40 INJECTION, SOLUTION INTRAVENOUS at 21:00

## 2017-06-26 RX ADMIN — LORAZEPAM 1.5 MG: 2 INJECTION INTRAMUSCULAR; INTRAVENOUS at 10:48

## 2017-06-26 RX ADMIN — MAGNESIUM SULFATE HEPTAHYDRATE 2 G: 40 INJECTION, SOLUTION INTRAVENOUS at 08:09

## 2017-06-26 RX ADMIN — LEVALBUTEROL HYDROCHLORIDE 1.25 MG: 1.25 SOLUTION, CONCENTRATE RESPIRATORY (INHALATION) at 07:36

## 2017-06-26 RX ADMIN — CEFTRIAXONE 1000 MG: 1 INJECTION, POWDER, FOR SOLUTION INTRAMUSCULAR; INTRAVENOUS at 07:42

## 2017-06-26 RX ADMIN — CHLORDIAZEPOXIDE HYDROCHLORIDE 60 MG: 5 CAPSULE ORAL at 12:04

## 2017-06-26 RX ADMIN — METRONIDAZOLE 500 MG: 500 INJECTION, SOLUTION INTRAVENOUS at 09:20

## 2017-06-26 RX ADMIN — LEVALBUTEROL HYDROCHLORIDE 1.25 MG: 1.25 SOLUTION, CONCENTRATE RESPIRATORY (INHALATION) at 13:33

## 2017-06-26 RX ADMIN — POTASSIUM CHLORIDE 20 MEQ: 200 INJECTION, SOLUTION INTRAVENOUS at 12:01

## 2017-06-26 RX ADMIN — MAGNESIUM SULFATE HEPTAHYDRATE 2 G: 40 INJECTION, SOLUTION INTRAVENOUS at 09:53

## 2017-06-26 RX ADMIN — CHLORDIAZEPOXIDE HYDROCHLORIDE 60 MG: 5 CAPSULE ORAL at 18:04

## 2017-06-26 RX ADMIN — AZITHROMYCIN MONOHYDRATE 500 MG: 500 INJECTION, POWDER, LYOPHILIZED, FOR SOLUTION INTRAVENOUS at 08:09

## 2017-06-26 RX ADMIN — LEVALBUTEROL HYDROCHLORIDE 1.25 MG: 1.25 SOLUTION, CONCENTRATE RESPIRATORY (INHALATION) at 20:59

## 2017-06-26 RX ADMIN — PANTOPRAZOLE SODIUM 40 MG: 40 INJECTION, POWDER, FOR SOLUTION INTRAVENOUS at 08:12

## 2017-06-26 RX ADMIN — LORAZEPAM 2 MG: 2 INJECTION INTRAMUSCULAR; INTRAVENOUS at 15:24

## 2017-06-26 RX ADMIN — POTASSIUM CHLORIDE 20 MEQ: 200 INJECTION, SOLUTION INTRAVENOUS at 18:37

## 2017-06-26 RX ADMIN — FOLIC ACID: 5 INJECTION, SOLUTION INTRAMUSCULAR; INTRAVENOUS; SUBCUTANEOUS at 10:56

## 2017-06-26 RX ADMIN — IPRATROPIUM BROMIDE 0.5 MG: 0.5 SOLUTION RESPIRATORY (INHALATION) at 13:33

## 2017-06-26 RX ADMIN — IPRATROPIUM BROMIDE 0.5 MG: 0.5 SOLUTION RESPIRATORY (INHALATION) at 07:36

## 2017-06-26 RX ADMIN — CEFTRIAXONE 1000 MG: 1 INJECTION, POWDER, FOR SOLUTION INTRAMUSCULAR; INTRAVENOUS at 20:00

## 2017-06-26 RX ADMIN — POTASSIUM CHLORIDE 20 MEQ: 200 INJECTION, SOLUTION INTRAVENOUS at 08:09

## 2017-06-26 RX ADMIN — GUAIFENESIN 600 MG: 600 TABLET, EXTENDED RELEASE ORAL at 21:57

## 2017-06-26 RX ADMIN — HEPARIN SODIUM AND DEXTROSE 18 UNITS/KG/HR: 10000; 5 INJECTION INTRAVENOUS at 13:55

## 2017-06-26 RX ADMIN — NICOTINE 21 MG: 21 PATCH, EXTENDED RELEASE TRANSDERMAL at 09:55

## 2017-06-26 RX ADMIN — POTASSIUM CHLORIDE 40 MEQ: 20 SOLUTION ORAL at 21:53

## 2017-06-26 RX ADMIN — IPRATROPIUM BROMIDE 0.5 MG: 0.5 SOLUTION RESPIRATORY (INHALATION) at 20:59

## 2017-06-26 RX ADMIN — METRONIDAZOLE 500 MG: 500 INJECTION, SOLUTION INTRAVENOUS at 18:03

## 2017-06-26 RX ADMIN — HEPARIN SODIUM 2000 UNITS: 1000 INJECTION, SOLUTION INTRAVENOUS; SUBCUTANEOUS at 09:30

## 2017-06-26 RX ADMIN — POTASSIUM CHLORIDE 20 MEQ: 200 INJECTION, SOLUTION INTRAVENOUS at 09:53

## 2017-06-26 RX ADMIN — POTASSIUM CHLORIDE 20 MEQ: 200 INJECTION, SOLUTION INTRAVENOUS at 13:52

## 2017-06-27 ENCOUNTER — APPOINTMENT (INPATIENT)
Dept: RADIOLOGY | Facility: HOSPITAL | Age: 52
DRG: 175 | End: 2017-06-27

## 2017-06-27 ENCOUNTER — APPOINTMENT (INPATIENT)
Dept: ULTRASOUND IMAGING | Facility: HOSPITAL | Age: 52
DRG: 175 | End: 2017-06-27

## 2017-06-27 PROBLEM — E87.6 HYPOKALEMIA: Status: ACTIVE | Noted: 2017-06-27

## 2017-06-27 PROBLEM — F10.231 ALCOHOL WITHDRAWAL SYNDROME, WITH DELIRIUM (HCC): Status: ACTIVE | Noted: 2017-06-27

## 2017-06-27 PROBLEM — F10.10 ETOH ABUSE: Status: ACTIVE | Noted: 2017-06-27

## 2017-06-27 PROBLEM — Z72.0 TOBACCO ABUSE: Status: ACTIVE | Noted: 2017-06-27

## 2017-06-27 PROBLEM — E88.09 HYPOALBUMINEMIA: Status: ACTIVE | Noted: 2017-06-27

## 2017-06-27 PROBLEM — E80.6 HYPERBILIRUBINEMIA: Status: ACTIVE | Noted: 2017-06-27

## 2017-06-27 PROBLEM — J96.00 ACUTE RESPIRATORY FAILURE (HCC): Status: ACTIVE | Noted: 2017-06-27

## 2017-06-27 PROBLEM — R55 SYNCOPE: Status: ACTIVE | Noted: 2017-06-27

## 2017-06-27 PROBLEM — J18.9 CAP (COMMUNITY ACQUIRED PNEUMONIA): Status: ACTIVE | Noted: 2017-06-27

## 2017-06-27 PROBLEM — I26.99 PE (PULMONARY THROMBOEMBOLISM) (HCC): Status: ACTIVE | Noted: 2017-06-27

## 2017-06-27 PROBLEM — F10.931 ALCOHOL WITHDRAWAL SYNDROME, WITH DELIRIUM (HCC): Status: ACTIVE | Noted: 2017-06-27

## 2017-06-27 PROBLEM — D62 ACUTE BLOOD LOSS ANEMIA: Status: ACTIVE | Noted: 2017-06-27

## 2017-06-27 LAB
ANION GAP SERPL CALCULATED.3IONS-SCNC: 10 MMOL/L (ref 4–13)
APTT PPP: 50 SECONDS (ref 23–35)
APTT PPP: 58 SECONDS (ref 23–35)
APTT PPP: 62 SECONDS (ref 23–35)
APTT PPP: 64 SECONDS (ref 23–35)
BUN SERPL-MCNC: 12 MG/DL (ref 5–25)
CALCIUM SERPL-MCNC: 7.5 MG/DL (ref 8.3–10.1)
CHLORIDE SERPL-SCNC: 102 MMOL/L (ref 100–108)
CO2 SERPL-SCNC: 28 MMOL/L (ref 21–32)
CREAT SERPL-MCNC: 0.79 MG/DL (ref 0.6–1.3)
ERYTHROCYTE [DISTWIDTH] IN BLOOD BY AUTOMATED COUNT: 24.8 % (ref 11.6–15.1)
GFR SERPL CREATININE-BSD FRML MDRD: >60 ML/MIN/1.73SQ M
GLUCOSE SERPL-MCNC: 123 MG/DL (ref 65–140)
GLUCOSE SERPL-MCNC: 126 MG/DL (ref 65–140)
HAPTOGLOB SERPL-MCNC: 185 MG/DL (ref 34–200)
HCT VFR BLD AUTO: 22.6 % (ref 36.5–49.3)
HGB BLD-MCNC: 7.5 G/DL (ref 12–17)
MAGNESIUM SERPL-MCNC: 2.3 MG/DL (ref 1.6–2.6)
MCH RBC QN AUTO: 28.1 PG (ref 26.8–34.3)
MCHC RBC AUTO-ENTMCNC: 33.2 G/DL (ref 31.4–37.4)
MCV RBC AUTO: 85 FL (ref 82–98)
OSMOLALITY UR/SERPL-RTO: 284 MMOL/KG (ref 282–298)
OSMOLALITY UR: 487 MMOL/KG
PLATELET # BLD AUTO: 281 THOUSANDS/UL (ref 149–390)
PMV BLD AUTO: 11.1 FL (ref 8.9–12.7)
POTASSIUM SERPL-SCNC: 3.4 MMOL/L (ref 3.5–5.3)
RBC # BLD AUTO: 2.67 MILLION/UL (ref 3.88–5.62)
SODIUM SERPL-SCNC: 140 MMOL/L (ref 136–145)
WBC # BLD AUTO: 12.7 THOUSAND/UL (ref 4.31–10.16)

## 2017-06-27 PROCEDURE — 85730 THROMBOPLASTIN TIME PARTIAL: CPT | Performed by: PHYSICIAN ASSISTANT

## 2017-06-27 PROCEDURE — 80048 BASIC METABOLIC PNL TOTAL CA: CPT | Performed by: NURSE PRACTITIONER

## 2017-06-27 PROCEDURE — 94640 AIRWAY INHALATION TREATMENT: CPT

## 2017-06-27 PROCEDURE — C9113 INJ PANTOPRAZOLE SODIUM, VIA: HCPCS | Performed by: NURSE PRACTITIONER

## 2017-06-27 PROCEDURE — 85027 COMPLETE CBC AUTOMATED: CPT | Performed by: NURSE PRACTITIONER

## 2017-06-27 PROCEDURE — 71010 HB CHEST X-RAY 1 VIEW FRONTAL (PORTABLE): CPT

## 2017-06-27 PROCEDURE — 82948 REAGENT STRIP/BLOOD GLUCOSE: CPT

## 2017-06-27 PROCEDURE — 85730 THROMBOPLASTIN TIME PARTIAL: CPT | Performed by: ANESTHESIOLOGY

## 2017-06-27 PROCEDURE — 83735 ASSAY OF MAGNESIUM: CPT | Performed by: NURSE PRACTITIONER

## 2017-06-27 PROCEDURE — 85730 THROMBOPLASTIN TIME PARTIAL: CPT | Performed by: NURSE PRACTITIONER

## 2017-06-27 PROCEDURE — 94760 N-INVAS EAR/PLS OXIMETRY 1: CPT

## 2017-06-27 PROCEDURE — 93970 EXTREMITY STUDY: CPT

## 2017-06-27 RX ORDER — POTASSIUM CHLORIDE 20MEQ/15ML
40 LIQUID (ML) ORAL ONCE
Status: COMPLETED | OUTPATIENT
Start: 2017-06-27 | End: 2017-06-27

## 2017-06-27 RX ORDER — POTASSIUM CHLORIDE 20MEQ/15ML
20 LIQUID (ML) ORAL ONCE
Status: COMPLETED | OUTPATIENT
Start: 2017-06-27 | End: 2017-06-27

## 2017-06-27 RX ADMIN — CHLORDIAZEPOXIDE HYDROCHLORIDE 60 MG: 5 CAPSULE ORAL at 00:15

## 2017-06-27 RX ADMIN — DEXMEDETOMIDINE HYDROCHLORIDE 1.4 MCG/KG/HR: 100 INJECTION, SOLUTION INTRAVENOUS at 15:50

## 2017-06-27 RX ADMIN — METRONIDAZOLE 500 MG: 500 INJECTION, SOLUTION INTRAVENOUS at 00:16

## 2017-06-27 RX ADMIN — HEPARIN SODIUM 2000 UNITS: 1000 INJECTION, SOLUTION INTRAVENOUS; SUBCUTANEOUS at 06:55

## 2017-06-27 RX ADMIN — FOLIC ACID: 5 INJECTION, SOLUTION INTRAMUSCULAR; INTRAVENOUS; SUBCUTANEOUS at 09:10

## 2017-06-27 RX ADMIN — METRONIDAZOLE 500 MG: 500 INJECTION, SOLUTION INTRAVENOUS at 09:14

## 2017-06-27 RX ADMIN — CHLORDIAZEPOXIDE HYDROCHLORIDE 60 MG: 5 CAPSULE ORAL at 11:37

## 2017-06-27 RX ADMIN — PANTOPRAZOLE SODIUM 40 MG: 40 INJECTION, POWDER, FOR SOLUTION INTRAVENOUS at 09:14

## 2017-06-27 RX ADMIN — CEFTRIAXONE 1000 MG: 1 INJECTION, POWDER, FOR SOLUTION INTRAMUSCULAR; INTRAVENOUS at 19:41

## 2017-06-27 RX ADMIN — POTASSIUM CHLORIDE 20 MEQ: 20 SOLUTION ORAL at 05:45

## 2017-06-27 RX ADMIN — LEVALBUTEROL HYDROCHLORIDE 1.25 MG: 1.25 SOLUTION, CONCENTRATE RESPIRATORY (INHALATION) at 13:09

## 2017-06-27 RX ADMIN — LORAZEPAM 2 MG: 2 INJECTION INTRAMUSCULAR; INTRAVENOUS at 07:37

## 2017-06-27 RX ADMIN — GUAIFENESIN 600 MG: 600 TABLET, EXTENDED RELEASE ORAL at 09:14

## 2017-06-27 RX ADMIN — AZITHROMYCIN MONOHYDRATE 500 MG: 500 INJECTION, POWDER, LYOPHILIZED, FOR SOLUTION INTRAVENOUS at 09:10

## 2017-06-27 RX ADMIN — IPRATROPIUM BROMIDE 0.5 MG: 0.5 SOLUTION RESPIRATORY (INHALATION) at 20:54

## 2017-06-27 RX ADMIN — IPRATROPIUM BROMIDE 0.5 MG: 0.5 SOLUTION RESPIRATORY (INHALATION) at 13:09

## 2017-06-27 RX ADMIN — DEXMEDETOMIDINE HYDROCHLORIDE 1.4 MCG/KG/HR: 100 INJECTION, SOLUTION INTRAVENOUS at 17:52

## 2017-06-27 RX ADMIN — LEVALBUTEROL HYDROCHLORIDE 1.25 MG: 1.25 SOLUTION, CONCENTRATE RESPIRATORY (INHALATION) at 20:54

## 2017-06-27 RX ADMIN — HEPARIN SODIUM 2000 UNITS: 1000 INJECTION, SOLUTION INTRAVENOUS; SUBCUTANEOUS at 14:30

## 2017-06-27 RX ADMIN — DEXMEDETOMIDINE HYDROCHLORIDE 0.1 MCG/KG/HR: 100 INJECTION, SOLUTION INTRAVENOUS at 08:07

## 2017-06-27 RX ADMIN — LEVALBUTEROL HYDROCHLORIDE 1.25 MG: 1.25 SOLUTION, CONCENTRATE RESPIRATORY (INHALATION) at 07:58

## 2017-06-27 RX ADMIN — DEXMEDETOMIDINE HYDROCHLORIDE 1.4 MCG/KG/HR: 100 INJECTION, SOLUTION INTRAVENOUS at 20:05

## 2017-06-27 RX ADMIN — HEPARIN SODIUM AND DEXTROSE 20 UNITS/KG/HR: 10000; 5 INJECTION INTRAVENOUS at 08:26

## 2017-06-27 RX ADMIN — CHLORDIAZEPOXIDE HYDROCHLORIDE 60 MG: 5 CAPSULE ORAL at 17:02

## 2017-06-27 RX ADMIN — NICOTINE 21 MG: 21 PATCH, EXTENDED RELEASE TRANSDERMAL at 09:14

## 2017-06-27 RX ADMIN — CEFTRIAXONE 1000 MG: 1 INJECTION, POWDER, FOR SOLUTION INTRAMUSCULAR; INTRAVENOUS at 09:10

## 2017-06-27 RX ADMIN — POTASSIUM CHLORIDE 40 MEQ: 20 SOLUTION ORAL at 05:45

## 2017-06-27 RX ADMIN — DEXMEDETOMIDINE HYDROCHLORIDE 0.7 MCG/KG/HR: 100 INJECTION, SOLUTION INTRAVENOUS at 13:06

## 2017-06-27 RX ADMIN — IPRATROPIUM BROMIDE 0.5 MG: 0.5 SOLUTION RESPIRATORY (INHALATION) at 07:58

## 2017-06-27 RX ADMIN — DEXMEDETOMIDINE HYDROCHLORIDE 1.4 MCG/KG/HR: 100 INJECTION, SOLUTION INTRAVENOUS at 22:27

## 2017-06-27 RX ADMIN — PANTOPRAZOLE SODIUM 40 MG: 40 INJECTION, POWDER, FOR SOLUTION INTRAVENOUS at 20:00

## 2017-06-27 RX ADMIN — LORAZEPAM 2 MG: 2 INJECTION INTRAMUSCULAR; INTRAVENOUS at 11:35

## 2017-06-27 RX ADMIN — METRONIDAZOLE 500 MG: 500 INJECTION, SOLUTION INTRAVENOUS at 16:54

## 2017-06-27 RX ADMIN — LORAZEPAM 2 MG: 2 INJECTION INTRAMUSCULAR; INTRAVENOUS at 00:16

## 2017-06-27 RX ADMIN — GUAIFENESIN 600 MG: 600 TABLET, EXTENDED RELEASE ORAL at 20:00

## 2017-06-27 RX ADMIN — CHLORDIAZEPOXIDE HYDROCHLORIDE 60 MG: 5 CAPSULE ORAL at 06:49

## 2017-06-28 LAB
ABO GROUP BLD BPU: NORMAL
ABO GROUP BLD BPU: NORMAL
ANION GAP SERPL CALCULATED.3IONS-SCNC: 12 MMOL/L (ref 4–13)
ANISOCYTOSIS BLD QL SMEAR: PRESENT
APTT PPP: 56 SECONDS (ref 23–35)
APTT PPP: 60 SECONDS (ref 23–35)
APTT PPP: 62 SECONDS (ref 23–35)
BASOPHILS # BLD MANUAL: 0 THOUSAND/UL (ref 0–0.1)
BASOPHILS NFR MAR MANUAL: 0 % (ref 0–1)
BPU ID: NORMAL
BPU ID: NORMAL
BUN SERPL-MCNC: 14 MG/DL (ref 5–25)
CALCIUM SERPL-MCNC: 8.1 MG/DL (ref 8.3–10.1)
CHLORIDE SERPL-SCNC: 104 MMOL/L (ref 100–108)
CO2 SERPL-SCNC: 25 MMOL/L (ref 21–32)
CREAT SERPL-MCNC: 0.86 MG/DL (ref 0.6–1.3)
CROSSMATCH: NORMAL
CROSSMATCH: NORMAL
EOSINOPHIL # BLD MANUAL: 0.48 THOUSAND/UL (ref 0–0.4)
EOSINOPHIL NFR BLD MANUAL: 4 % (ref 0–6)
ERYTHROCYTE [DISTWIDTH] IN BLOOD BY AUTOMATED COUNT: 24.8 % (ref 11.6–15.1)
GFR SERPL CREATININE-BSD FRML MDRD: >60 ML/MIN/1.73SQ M
GLUCOSE SERPL-MCNC: 135 MG/DL (ref 65–140)
GLUCOSE SERPL-MCNC: 155 MG/DL (ref 65–140)
HCT VFR BLD AUTO: 23.7 % (ref 36.5–49.3)
HGB BLD-MCNC: 7.7 G/DL (ref 12–17)
LG PLATELETS BLD QL SMEAR: PRESENT
LYMPHOCYTES # BLD AUTO: 2.62 THOUSAND/UL (ref 0.6–4.47)
LYMPHOCYTES # BLD AUTO: 22 % (ref 14–44)
MAGNESIUM SERPL-MCNC: 1.4 MG/DL (ref 1.6–2.6)
MCH RBC QN AUTO: 28 PG (ref 26.8–34.3)
MCHC RBC AUTO-ENTMCNC: 32.5 G/DL (ref 31.4–37.4)
MCV RBC AUTO: 86 FL (ref 82–98)
MONOCYTES # BLD AUTO: 0.24 THOUSAND/UL (ref 0–1.22)
MONOCYTES NFR BLD: 2 % (ref 4–12)
NEUTROPHILS # BLD MANUAL: 8.56 THOUSAND/UL (ref 1.85–7.62)
NEUTS SEG NFR BLD AUTO: 72 % (ref 43–75)
NRBC BLD AUTO-RTO: 1 /100 WBCS
PLATELET # BLD AUTO: 307 THOUSANDS/UL (ref 149–390)
PLATELET BLD QL SMEAR: ADEQUATE
PMV BLD AUTO: 11.6 FL (ref 8.9–12.7)
POLYCHROMASIA BLD QL SMEAR: PRESENT
POTASSIUM SERPL-SCNC: 3.8 MMOL/L (ref 3.5–5.3)
RBC # BLD AUTO: 2.75 MILLION/UL (ref 3.88–5.62)
SODIUM SERPL-SCNC: 141 MMOL/L (ref 136–145)
TOTAL CELLS COUNTED SPEC: 100
UNIT DISPENSE STATUS: NORMAL
UNIT DISPENSE STATUS: NORMAL
UNIT PRODUCT CODE: NORMAL
UNIT PRODUCT CODE: NORMAL
UNIT RH: NORMAL
UNIT RH: NORMAL
WBC # BLD AUTO: 11.89 THOUSAND/UL (ref 4.31–10.16)

## 2017-06-28 PROCEDURE — 83735 ASSAY OF MAGNESIUM: CPT | Performed by: PHYSICIAN ASSISTANT

## 2017-06-28 PROCEDURE — C9113 INJ PANTOPRAZOLE SODIUM, VIA: HCPCS | Performed by: NURSE PRACTITIONER

## 2017-06-28 PROCEDURE — 80048 BASIC METABOLIC PNL TOTAL CA: CPT | Performed by: PHYSICIAN ASSISTANT

## 2017-06-28 PROCEDURE — 94760 N-INVAS EAR/PLS OXIMETRY 1: CPT

## 2017-06-28 PROCEDURE — 85730 THROMBOPLASTIN TIME PARTIAL: CPT | Performed by: PHYSICIAN ASSISTANT

## 2017-06-28 PROCEDURE — 85027 COMPLETE CBC AUTOMATED: CPT | Performed by: PHYSICIAN ASSISTANT

## 2017-06-28 PROCEDURE — 82948 REAGENT STRIP/BLOOD GLUCOSE: CPT

## 2017-06-28 PROCEDURE — 85007 BL SMEAR W/DIFF WBC COUNT: CPT | Performed by: PHYSICIAN ASSISTANT

## 2017-06-28 PROCEDURE — 94640 AIRWAY INHALATION TREATMENT: CPT

## 2017-06-28 PROCEDURE — 85730 THROMBOPLASTIN TIME PARTIAL: CPT | Performed by: NURSE PRACTITIONER

## 2017-06-28 RX ORDER — POTASSIUM CHLORIDE 20MEQ/15ML
20 LIQUID (ML) ORAL ONCE
Status: COMPLETED | OUTPATIENT
Start: 2017-06-28 | End: 2017-06-28

## 2017-06-28 RX ORDER — MAGNESIUM SULFATE HEPTAHYDRATE 40 MG/ML
4 INJECTION, SOLUTION INTRAVENOUS ONCE
Status: COMPLETED | OUTPATIENT
Start: 2017-06-28 | End: 2017-06-29

## 2017-06-28 RX ORDER — MULTIVIT WITH IRON,MINERALS
15 LIQUID (ML) ORAL DAILY
Status: DISCONTINUED | OUTPATIENT
Start: 2017-06-28 | End: 2017-07-10 | Stop reason: HOSPADM

## 2017-06-28 RX ADMIN — DEXMEDETOMIDINE HYDROCHLORIDE 1.4 MCG/KG/HR: 100 INJECTION, SOLUTION INTRAVENOUS at 06:42

## 2017-06-28 RX ADMIN — LEVALBUTEROL HYDROCHLORIDE 1.25 MG: 1.25 SOLUTION, CONCENTRATE RESPIRATORY (INHALATION) at 08:12

## 2017-06-28 RX ADMIN — DEXMEDETOMIDINE HYDROCHLORIDE 1.4 MCG/KG/HR: 100 INJECTION, SOLUTION INTRAVENOUS at 16:00

## 2017-06-28 RX ADMIN — DEXMEDETOMIDINE HYDROCHLORIDE 1.5 MCG/KG/HR: 100 INJECTION, SOLUTION INTRAVENOUS at 13:06

## 2017-06-28 RX ADMIN — DEXMEDETOMIDINE HYDROCHLORIDE 1.4 MCG/KG/HR: 100 INJECTION, SOLUTION INTRAVENOUS at 02:29

## 2017-06-28 RX ADMIN — CHLORDIAZEPOXIDE HYDROCHLORIDE 30 MG: 5 CAPSULE ORAL at 14:19

## 2017-06-28 RX ADMIN — GUAIFENESIN 600 MG: 600 TABLET, EXTENDED RELEASE ORAL at 20:54

## 2017-06-28 RX ADMIN — METRONIDAZOLE 500 MG: 500 INJECTION, SOLUTION INTRAVENOUS at 16:46

## 2017-06-28 RX ADMIN — MAGNESIUM SULFATE HEPTAHYDRATE 4 G: 40 INJECTION, SOLUTION INTRAVENOUS at 11:20

## 2017-06-28 RX ADMIN — POTASSIUM CHLORIDE 20 MEQ: 20 SOLUTION ORAL at 11:20

## 2017-06-28 RX ADMIN — CHLORDIAZEPOXIDE HYDROCHLORIDE 30 MG: 5 CAPSULE ORAL at 21:01

## 2017-06-28 RX ADMIN — HEPARIN SODIUM AND DEXTROSE 20 UNITS/KG/HR: 10000; 5 INJECTION INTRAVENOUS at 02:23

## 2017-06-28 RX ADMIN — IPRATROPIUM BROMIDE 0.5 MG: 0.5 SOLUTION RESPIRATORY (INHALATION) at 20:39

## 2017-06-28 RX ADMIN — PANTOPRAZOLE SODIUM 40 MG: 40 INJECTION, POWDER, FOR SOLUTION INTRAVENOUS at 09:21

## 2017-06-28 RX ADMIN — LORAZEPAM 1 MG: 2 INJECTION INTRAMUSCULAR; INTRAVENOUS at 00:31

## 2017-06-28 RX ADMIN — CHLORDIAZEPOXIDE HYDROCHLORIDE 60 MG: 5 CAPSULE ORAL at 06:12

## 2017-06-28 RX ADMIN — FOLIC ACID: 5 INJECTION, SOLUTION INTRAMUSCULAR; INTRAVENOUS; SUBCUTANEOUS at 09:22

## 2017-06-28 RX ADMIN — LEVALBUTEROL HYDROCHLORIDE 1.25 MG: 1.25 SOLUTION, CONCENTRATE RESPIRATORY (INHALATION) at 13:43

## 2017-06-28 RX ADMIN — DEXMEDETOMIDINE HYDROCHLORIDE 1 MCG/KG/HR: 100 INJECTION, SOLUTION INTRAVENOUS at 20:53

## 2017-06-28 RX ADMIN — LEVALBUTEROL HYDROCHLORIDE 1.25 MG: 1.25 SOLUTION, CONCENTRATE RESPIRATORY (INHALATION) at 20:39

## 2017-06-28 RX ADMIN — DEXMEDETOMIDINE HYDROCHLORIDE 1.5 MCG/KG/HR: 100 INJECTION, SOLUTION INTRAVENOUS at 09:15

## 2017-06-28 RX ADMIN — IPRATROPIUM BROMIDE 0.5 MG: 0.5 SOLUTION RESPIRATORY (INHALATION) at 13:43

## 2017-06-28 RX ADMIN — HEPARIN SODIUM AND DEXTROSE 22 UNITS/KG/HR: 10000; 5 INJECTION INTRAVENOUS at 18:13

## 2017-06-28 RX ADMIN — HEPARIN SODIUM 2000 UNITS: 1000 INJECTION, SOLUTION INTRAVENOUS; SUBCUTANEOUS at 03:37

## 2017-06-28 RX ADMIN — DEXMEDETOMIDINE HYDROCHLORIDE 1.4 MCG/KG/HR: 100 INJECTION, SOLUTION INTRAVENOUS at 04:35

## 2017-06-28 RX ADMIN — DEXMEDETOMIDINE HYDROCHLORIDE 1.4 MCG/KG/HR: 100 INJECTION, SOLUTION INTRAVENOUS at 00:23

## 2017-06-28 RX ADMIN — DEXMEDETOMIDINE HYDROCHLORIDE 1.5 MCG/KG/HR: 100 INJECTION, SOLUTION INTRAVENOUS at 11:51

## 2017-06-28 RX ADMIN — METRONIDAZOLE 500 MG: 500 INJECTION, SOLUTION INTRAVENOUS at 09:21

## 2017-06-28 RX ADMIN — PANTOPRAZOLE SODIUM 40 MG: 40 INJECTION, POWDER, FOR SOLUTION INTRAVENOUS at 20:53

## 2017-06-28 RX ADMIN — MULTIVITAMIN 15 ML: LIQUID ORAL at 11:16

## 2017-06-28 RX ADMIN — METRONIDAZOLE 500 MG: 500 INJECTION, SOLUTION INTRAVENOUS at 00:29

## 2017-06-28 RX ADMIN — CHLORDIAZEPOXIDE HYDROCHLORIDE 60 MG: 5 CAPSULE ORAL at 00:32

## 2017-06-28 RX ADMIN — GUAIFENESIN 600 MG: 600 TABLET, EXTENDED RELEASE ORAL at 09:20

## 2017-06-28 RX ADMIN — HEPARIN SODIUM AND DEXTROSE 22 UNITS/KG/HR: 10000; 5 INJECTION INTRAVENOUS at 03:35

## 2017-06-28 RX ADMIN — IPRATROPIUM BROMIDE 0.5 MG: 0.5 SOLUTION RESPIRATORY (INHALATION) at 08:12

## 2017-06-28 RX ADMIN — CEFTRIAXONE 1000 MG: 1 INJECTION, POWDER, FOR SOLUTION INTRAMUSCULAR; INTRAVENOUS at 19:39

## 2017-06-28 RX ADMIN — NICOTINE 21 MG: 21 PATCH, EXTENDED RELEASE TRANSDERMAL at 09:21

## 2017-06-28 RX ADMIN — AZITHROMYCIN MONOHYDRATE 500 MG: 500 INJECTION, POWDER, LYOPHILIZED, FOR SOLUTION INTRAVENOUS at 09:18

## 2017-06-28 RX ADMIN — DEXMEDETOMIDINE HYDROCHLORIDE 0.7 MCG/KG/HR: 100 INJECTION, SOLUTION INTRAVENOUS at 18:12

## 2017-06-28 RX ADMIN — CEFTRIAXONE 1000 MG: 1 INJECTION, POWDER, FOR SOLUTION INTRAMUSCULAR; INTRAVENOUS at 09:13

## 2017-06-29 LAB
ANION GAP SERPL CALCULATED.3IONS-SCNC: 8 MMOL/L (ref 4–13)
ANISOCYTOSIS BLD QL SMEAR: PRESENT
APTT PPP: 69 SECONDS (ref 23–35)
BACTERIA BLD CULT: NORMAL
BACTERIA BLD CULT: NORMAL
BASOPHILS # BLD MANUAL: 0 THOUSAND/UL (ref 0–0.1)
BASOPHILS NFR MAR MANUAL: 0 % (ref 0–1)
BUN SERPL-MCNC: 13 MG/DL (ref 5–25)
CALCIUM SERPL-MCNC: 7.8 MG/DL (ref 8.3–10.1)
CHLORIDE SERPL-SCNC: 107 MMOL/L (ref 100–108)
CO2 SERPL-SCNC: 28 MMOL/L (ref 21–32)
CREAT SERPL-MCNC: 0.8 MG/DL (ref 0.6–1.3)
EOSINOPHIL # BLD MANUAL: 0 THOUSAND/UL (ref 0–0.4)
EOSINOPHIL NFR BLD MANUAL: 0 % (ref 0–6)
ERYTHROCYTE [DISTWIDTH] IN BLOOD BY AUTOMATED COUNT: 25.4 % (ref 11.6–15.1)
GFR SERPL CREATININE-BSD FRML MDRD: >60 ML/MIN/1.73SQ M
GLUCOSE SERPL-MCNC: 131 MG/DL (ref 65–140)
GLUCOSE SERPL-MCNC: 135 MG/DL (ref 65–140)
GLUCOSE SERPL-MCNC: 152 MG/DL (ref 65–140)
GLUCOSE SERPL-MCNC: 168 MG/DL (ref 65–140)
GLUCOSE SERPL-MCNC: 182 MG/DL (ref 65–140)
HCT VFR BLD AUTO: 23 % (ref 36.5–49.3)
HGB BLD-MCNC: 7.2 G/DL (ref 12–17)
LG PLATELETS BLD QL SMEAR: PRESENT
LYMPHOCYTES # BLD AUTO: 18 % (ref 14–44)
LYMPHOCYTES # BLD AUTO: 2.52 THOUSAND/UL (ref 0.6–4.47)
MAGNESIUM SERPL-MCNC: 1.6 MG/DL (ref 1.6–2.6)
MCH RBC QN AUTO: 27.4 PG (ref 26.8–34.3)
MCHC RBC AUTO-ENTMCNC: 31.3 G/DL (ref 31.4–37.4)
MCV RBC AUTO: 88 FL (ref 82–98)
METAMYELOCYTES NFR BLD MANUAL: 1 % (ref 0–1)
MONOCYTES # BLD AUTO: 0.7 THOUSAND/UL (ref 0–1.22)
MONOCYTES NFR BLD: 5 % (ref 4–12)
NEUTROPHILS # BLD MANUAL: 10.66 THOUSAND/UL (ref 1.85–7.62)
NEUTS BAND NFR BLD MANUAL: 1 % (ref 0–8)
NEUTS SEG NFR BLD AUTO: 75 % (ref 43–75)
NRBC BLD AUTO-RTO: 2 /100 WBC (ref 0–2)
NRBC BLD AUTO-RTO: 4 /100 WBCS
PLATELET # BLD AUTO: 329 THOUSANDS/UL (ref 149–390)
PLATELET BLD QL SMEAR: ADEQUATE
PMV BLD AUTO: 12 FL (ref 8.9–12.7)
POLYCHROMASIA BLD QL SMEAR: PRESENT
POTASSIUM SERPL-SCNC: 3.2 MMOL/L (ref 3.5–5.3)
RBC # BLD AUTO: 2.63 MILLION/UL (ref 3.88–5.62)
SODIUM SERPL-SCNC: 143 MMOL/L (ref 136–145)
TOTAL CELLS COUNTED SPEC: 100
WBC # BLD AUTO: 14.02 THOUSAND/UL (ref 4.31–10.16)

## 2017-06-29 PROCEDURE — G8988 SELF CARE GOAL STATUS: HCPCS

## 2017-06-29 PROCEDURE — 97530 THERAPEUTIC ACTIVITIES: CPT

## 2017-06-29 PROCEDURE — G8979 MOBILITY GOAL STATUS: HCPCS

## 2017-06-29 PROCEDURE — 83735 ASSAY OF MAGNESIUM: CPT | Performed by: PHYSICIAN ASSISTANT

## 2017-06-29 PROCEDURE — 82948 REAGENT STRIP/BLOOD GLUCOSE: CPT

## 2017-06-29 PROCEDURE — 94760 N-INVAS EAR/PLS OXIMETRY 1: CPT

## 2017-06-29 PROCEDURE — G8978 MOBILITY CURRENT STATUS: HCPCS

## 2017-06-29 PROCEDURE — 80048 BASIC METABOLIC PNL TOTAL CA: CPT | Performed by: PHYSICIAN ASSISTANT

## 2017-06-29 PROCEDURE — G8987 SELF CARE CURRENT STATUS: HCPCS

## 2017-06-29 PROCEDURE — 85007 BL SMEAR W/DIFF WBC COUNT: CPT | Performed by: PHYSICIAN ASSISTANT

## 2017-06-29 PROCEDURE — 97163 PT EVAL HIGH COMPLEX 45 MIN: CPT

## 2017-06-29 PROCEDURE — C9113 INJ PANTOPRAZOLE SODIUM, VIA: HCPCS | Performed by: NURSE PRACTITIONER

## 2017-06-29 PROCEDURE — 94640 AIRWAY INHALATION TREATMENT: CPT

## 2017-06-29 PROCEDURE — 85027 COMPLETE CBC AUTOMATED: CPT | Performed by: PHYSICIAN ASSISTANT

## 2017-06-29 PROCEDURE — 97167 OT EVAL HIGH COMPLEX 60 MIN: CPT

## 2017-06-29 PROCEDURE — 85730 THROMBOPLASTIN TIME PARTIAL: CPT | Performed by: NURSE PRACTITIONER

## 2017-06-29 PROCEDURE — 97110 THERAPEUTIC EXERCISES: CPT

## 2017-06-29 RX ORDER — POTASSIUM CHLORIDE 14.9 MG/ML
20 INJECTION INTRAVENOUS ONCE
Status: COMPLETED | OUTPATIENT
Start: 2017-06-29 | End: 2017-06-29

## 2017-06-29 RX ORDER — POTASSIUM CHLORIDE 14.9 MG/ML
20 INJECTION INTRAVENOUS ONCE
Status: COMPLETED | OUTPATIENT
Start: 2017-06-29 | End: 2017-07-01

## 2017-06-29 RX ORDER — LORAZEPAM 2 MG/ML
1 INJECTION INTRAMUSCULAR ONCE
Status: COMPLETED | OUTPATIENT
Start: 2017-06-29 | End: 2017-06-29

## 2017-06-29 RX ORDER — POTASSIUM CHLORIDE 29.8 MG/ML
40 INJECTION INTRAVENOUS ONCE
Status: DISCONTINUED | OUTPATIENT
Start: 2017-06-29 | End: 2017-06-29 | Stop reason: SDUPTHER

## 2017-06-29 RX ORDER — MAGNESIUM SULFATE HEPTAHYDRATE 40 MG/ML
2 INJECTION, SOLUTION INTRAVENOUS ONCE
Status: COMPLETED | OUTPATIENT
Start: 2017-06-29 | End: 2017-06-29

## 2017-06-29 RX ADMIN — LORAZEPAM 2 MG: 2 INJECTION INTRAMUSCULAR; INTRAVENOUS at 12:03

## 2017-06-29 RX ADMIN — METRONIDAZOLE 500 MG: 500 INJECTION, SOLUTION INTRAVENOUS at 00:40

## 2017-06-29 RX ADMIN — IPRATROPIUM BROMIDE 0.5 MG: 0.5 SOLUTION RESPIRATORY (INHALATION) at 14:18

## 2017-06-29 RX ADMIN — GUAIFENESIN 600 MG: 600 TABLET, EXTENDED RELEASE ORAL at 09:41

## 2017-06-29 RX ADMIN — FOLIC ACID: 5 INJECTION, SOLUTION INTRAMUSCULAR; INTRAVENOUS; SUBCUTANEOUS at 11:33

## 2017-06-29 RX ADMIN — POTASSIUM CHLORIDE 20 MEQ: 200 INJECTION, SOLUTION INTRAVENOUS at 10:29

## 2017-06-29 RX ADMIN — LEVALBUTEROL HYDROCHLORIDE 1.25 MG: 1.25 SOLUTION, CONCENTRATE RESPIRATORY (INHALATION) at 20:25

## 2017-06-29 RX ADMIN — IPRATROPIUM BROMIDE 0.5 MG: 0.5 SOLUTION RESPIRATORY (INHALATION) at 20:25

## 2017-06-29 RX ADMIN — LEVALBUTEROL HYDROCHLORIDE 1.25 MG: 1.25 SOLUTION, CONCENTRATE RESPIRATORY (INHALATION) at 14:18

## 2017-06-29 RX ADMIN — DEXMEDETOMIDINE HYDROCHLORIDE 1 MCG/KG/HR: 100 INJECTION, SOLUTION INTRAVENOUS at 00:04

## 2017-06-29 RX ADMIN — CHLORDIAZEPOXIDE HYDROCHLORIDE 30 MG: 5 CAPSULE ORAL at 06:20

## 2017-06-29 RX ADMIN — CHLORDIAZEPOXIDE HYDROCHLORIDE 30 MG: 5 CAPSULE ORAL at 13:52

## 2017-06-29 RX ADMIN — NICOTINE 21 MG: 21 PATCH, EXTENDED RELEASE TRANSDERMAL at 09:43

## 2017-06-29 RX ADMIN — CEFTRIAXONE 1000 MG: 1 INJECTION, POWDER, FOR SOLUTION INTRAMUSCULAR; INTRAVENOUS at 20:45

## 2017-06-29 RX ADMIN — HEPARIN SODIUM AND DEXTROSE 22 UNITS/KG/HR: 10000; 5 INJECTION INTRAVENOUS at 10:33

## 2017-06-29 RX ADMIN — METRONIDAZOLE 500 MG: 500 INJECTION, SOLUTION INTRAVENOUS at 16:26

## 2017-06-29 RX ADMIN — GUAIFENESIN 600 MG: 600 TABLET, EXTENDED RELEASE ORAL at 20:46

## 2017-06-29 RX ADMIN — MAGNESIUM SULFATE HEPTAHYDRATE 2 G: 40 INJECTION, SOLUTION INTRAVENOUS at 06:24

## 2017-06-29 RX ADMIN — DEXMEDETOMIDINE HYDROCHLORIDE 1 MCG/KG/HR: 100 INJECTION, SOLUTION INTRAVENOUS at 05:29

## 2017-06-29 RX ADMIN — POTASSIUM CHLORIDE 20 MEQ: 200 INJECTION, SOLUTION INTRAVENOUS at 06:43

## 2017-06-29 RX ADMIN — IPRATROPIUM BROMIDE 0.5 MG: 0.5 SOLUTION RESPIRATORY (INHALATION) at 08:41

## 2017-06-29 RX ADMIN — LEVALBUTEROL HYDROCHLORIDE 1.25 MG: 1.25 SOLUTION, CONCENTRATE RESPIRATORY (INHALATION) at 08:41

## 2017-06-29 RX ADMIN — AZITHROMYCIN MONOHYDRATE 500 MG: 500 INJECTION, POWDER, LYOPHILIZED, FOR SOLUTION INTRAVENOUS at 09:54

## 2017-06-29 RX ADMIN — DEXMEDETOMIDINE HYDROCHLORIDE 1 MCG/KG/HR: 100 INJECTION, SOLUTION INTRAVENOUS at 02:42

## 2017-06-29 RX ADMIN — PANTOPRAZOLE SODIUM 40 MG: 40 INJECTION, POWDER, FOR SOLUTION INTRAVENOUS at 20:46

## 2017-06-29 RX ADMIN — LORAZEPAM 1 MG: 2 INJECTION INTRAMUSCULAR; INTRAVENOUS at 14:53

## 2017-06-29 RX ADMIN — CEFTRIAXONE 1000 MG: 1 INJECTION, POWDER, FOR SOLUTION INTRAMUSCULAR; INTRAVENOUS at 08:51

## 2017-06-29 RX ADMIN — METRONIDAZOLE 500 MG: 500 INJECTION, SOLUTION INTRAVENOUS at 09:41

## 2017-06-29 RX ADMIN — PANTOPRAZOLE SODIUM 40 MG: 40 INJECTION, POWDER, FOR SOLUTION INTRAVENOUS at 08:52

## 2017-06-29 RX ADMIN — MULTIVITAMIN 15 ML: LIQUID ORAL at 08:51

## 2017-06-30 ENCOUNTER — APPOINTMENT (INPATIENT)
Dept: RADIOLOGY | Facility: HOSPITAL | Age: 52
DRG: 175 | End: 2017-06-30

## 2017-06-30 LAB
ALBUMIN SERPL BCP-MCNC: 2.1 G/DL (ref 3.5–5)
ALP SERPL-CCNC: 184 U/L (ref 46–116)
ALT SERPL W P-5'-P-CCNC: 13 U/L (ref 12–78)
ANION GAP SERPL CALCULATED.3IONS-SCNC: 11 MMOL/L (ref 4–13)
ANISOCYTOSIS BLD QL SMEAR: PRESENT
APTT PPP: 46 SECONDS (ref 23–35)
APTT PPP: 48 SECONDS (ref 23–35)
APTT PPP: 55 SECONDS (ref 23–35)
APTT PPP: 77 SECONDS (ref 23–35)
AST SERPL W P-5'-P-CCNC: 30 U/L (ref 5–45)
BASOPHILS # BLD MANUAL: 0 THOUSAND/UL (ref 0–0.1)
BASOPHILS NFR MAR MANUAL: 0 % (ref 0–1)
BILIRUB SERPL-MCNC: 0.5 MG/DL (ref 0.2–1)
BUN SERPL-MCNC: 9 MG/DL (ref 5–25)
CALCIUM SERPL-MCNC: 7.8 MG/DL (ref 8.3–10.1)
CHLORIDE SERPL-SCNC: 106 MMOL/L (ref 100–108)
CO2 SERPL-SCNC: 26 MMOL/L (ref 21–32)
CREAT SERPL-MCNC: 0.75 MG/DL (ref 0.6–1.3)
EOSINOPHIL # BLD MANUAL: 0.14 THOUSAND/UL (ref 0–0.4)
EOSINOPHIL NFR BLD MANUAL: 1 % (ref 0–6)
ERYTHROCYTE [DISTWIDTH] IN BLOOD BY AUTOMATED COUNT: 27.1 % (ref 11.6–15.1)
GFR SERPL CREATININE-BSD FRML MDRD: >60 ML/MIN/1.73SQ M
GLUCOSE SERPL-MCNC: 116 MG/DL (ref 65–140)
GLUCOSE SERPL-MCNC: 120 MG/DL (ref 65–140)
GLUCOSE SERPL-MCNC: 121 MG/DL (ref 65–140)
GLUCOSE SERPL-MCNC: 97 MG/DL (ref 65–140)
HCT VFR BLD AUTO: 21.9 % (ref 36.5–49.3)
HCT VFR BLD AUTO: 22.9 % (ref 36.5–49.3)
HGB BLD-MCNC: 6.8 G/DL (ref 12–17)
HGB BLD-MCNC: 7 G/DL (ref 12–17)
LG PLATELETS BLD QL SMEAR: PRESENT
LYMPHOCYTES # BLD AUTO: 1.69 THOUSAND/UL (ref 0.6–4.47)
LYMPHOCYTES # BLD AUTO: 12 % (ref 14–44)
MAGNESIUM SERPL-MCNC: 1.5 MG/DL (ref 1.6–2.6)
MCH RBC QN AUTO: 27.4 PG (ref 26.8–34.3)
MCHC RBC AUTO-ENTMCNC: 31.1 G/DL (ref 31.4–37.4)
MCV RBC AUTO: 88 FL (ref 82–98)
MONOCYTES # BLD AUTO: 0.7 THOUSAND/UL (ref 0–1.22)
MONOCYTES NFR BLD: 5 % (ref 4–12)
NEUTROPHILS # BLD MANUAL: 11.53 THOUSAND/UL (ref 1.85–7.62)
NEUTS SEG NFR BLD AUTO: 82 % (ref 43–75)
NRBC BLD AUTO-RTO: 5 /100 WBC (ref 0–2)
NRBC BLD AUTO-RTO: 5 /100 WBCS
PLATELET # BLD AUTO: 314 THOUSANDS/UL (ref 149–390)
PLATELET BLD QL SMEAR: ADEQUATE
POLYCHROMASIA BLD QL SMEAR: PRESENT
POTASSIUM SERPL-SCNC: 2.9 MMOL/L (ref 3.5–5.3)
PROT SERPL-MCNC: 5.5 G/DL (ref 6.4–8.2)
RBC # BLD AUTO: 2.48 MILLION/UL (ref 3.88–5.62)
SODIUM SERPL-SCNC: 143 MMOL/L (ref 136–145)
TOTAL CELLS COUNTED SPEC: 100
WBC # BLD AUTO: 14.06 THOUSAND/UL (ref 4.31–10.16)

## 2017-06-30 PROCEDURE — 85730 THROMBOPLASTIN TIME PARTIAL: CPT | Performed by: PHYSICIAN ASSISTANT

## 2017-06-30 PROCEDURE — C9113 INJ PANTOPRAZOLE SODIUM, VIA: HCPCS | Performed by: NURSE PRACTITIONER

## 2017-06-30 PROCEDURE — 85018 HEMOGLOBIN: CPT | Performed by: PHYSICIAN ASSISTANT

## 2017-06-30 PROCEDURE — 85014 HEMATOCRIT: CPT | Performed by: PHYSICIAN ASSISTANT

## 2017-06-30 PROCEDURE — 83735 ASSAY OF MAGNESIUM: CPT | Performed by: NURSE PRACTITIONER

## 2017-06-30 PROCEDURE — 94760 N-INVAS EAR/PLS OXIMETRY 1: CPT

## 2017-06-30 PROCEDURE — 80053 COMPREHEN METABOLIC PANEL: CPT | Performed by: NURSE PRACTITIONER

## 2017-06-30 PROCEDURE — 82948 REAGENT STRIP/BLOOD GLUCOSE: CPT

## 2017-06-30 PROCEDURE — 71010 HB CHEST X-RAY 1 VIEW FRONTAL (PORTABLE): CPT

## 2017-06-30 PROCEDURE — 94640 AIRWAY INHALATION TREATMENT: CPT

## 2017-06-30 PROCEDURE — 92610 EVALUATE SWALLOWING FUNCTION: CPT

## 2017-06-30 PROCEDURE — 85027 COMPLETE CBC AUTOMATED: CPT | Performed by: NURSE PRACTITIONER

## 2017-06-30 PROCEDURE — 85007 BL SMEAR W/DIFF WBC COUNT: CPT | Performed by: NURSE PRACTITIONER

## 2017-06-30 PROCEDURE — 85730 THROMBOPLASTIN TIME PARTIAL: CPT | Performed by: ANESTHESIOLOGY

## 2017-06-30 RX ORDER — POTASSIUM CHLORIDE 29.8 MG/ML
40 INJECTION INTRAVENOUS ONCE
Status: DISCONTINUED | OUTPATIENT
Start: 2017-06-30 | End: 2017-06-30

## 2017-06-30 RX ORDER — POTASSIUM CHLORIDE 14.9 MG/ML
20 INJECTION INTRAVENOUS
Status: COMPLETED | OUTPATIENT
Start: 2017-06-30 | End: 2017-06-30

## 2017-06-30 RX ORDER — POTASSIUM CHLORIDE 20MEQ/15ML
40 LIQUID (ML) ORAL EVERY 4 HOURS
Status: COMPLETED | OUTPATIENT
Start: 2017-06-30 | End: 2017-06-30

## 2017-06-30 RX ORDER — POTASSIUM CHLORIDE 20MEQ/15ML
40 LIQUID (ML) ORAL ONCE
Status: COMPLETED | OUTPATIENT
Start: 2017-06-30 | End: 2017-06-30

## 2017-06-30 RX ORDER — DEXTROSE MONOHYDRATE 25 G/50ML
50 INJECTION, SOLUTION INTRAVENOUS ONCE
Status: DISCONTINUED | OUTPATIENT
Start: 2017-06-30 | End: 2017-06-30

## 2017-06-30 RX ORDER — MAGNESIUM SULFATE HEPTAHYDRATE 40 MG/ML
2 INJECTION, SOLUTION INTRAVENOUS ONCE
Status: COMPLETED | OUTPATIENT
Start: 2017-06-30 | End: 2017-06-30

## 2017-06-30 RX ADMIN — MAGNESIUM SULFATE HEPTAHYDRATE 2 G: 40 INJECTION, SOLUTION INTRAVENOUS at 07:45

## 2017-06-30 RX ADMIN — NICOTINE 21 MG: 21 PATCH, EXTENDED RELEASE TRANSDERMAL at 09:04

## 2017-06-30 RX ADMIN — LORAZEPAM 2 MG: 2 INJECTION INTRAMUSCULAR; INTRAVENOUS at 07:10

## 2017-06-30 RX ADMIN — POTASSIUM CHLORIDE 40 MEQ: 20 SOLUTION ORAL at 09:03

## 2017-06-30 RX ADMIN — HEPARIN SODIUM 2000 UNITS: 1000 INJECTION, SOLUTION INTRAVENOUS; SUBCUTANEOUS at 20:37

## 2017-06-30 RX ADMIN — POTASSIUM CHLORIDE 40 MEQ: 20 SOLUTION ORAL at 05:27

## 2017-06-30 RX ADMIN — METRONIDAZOLE 500 MG: 500 INJECTION, SOLUTION INTRAVENOUS at 16:04

## 2017-06-30 RX ADMIN — HEPARIN SODIUM 2000 UNITS: 1000 INJECTION, SOLUTION INTRAVENOUS; SUBCUTANEOUS at 06:57

## 2017-06-30 RX ADMIN — POTASSIUM CHLORIDE 20 MEQ: 200 INJECTION, SOLUTION INTRAVENOUS at 09:14

## 2017-06-30 RX ADMIN — LEVALBUTEROL HYDROCHLORIDE 1.25 MG: 1.25 SOLUTION, CONCENTRATE RESPIRATORY (INHALATION) at 07:51

## 2017-06-30 RX ADMIN — GUAIFENESIN 600 MG: 600 TABLET, EXTENDED RELEASE ORAL at 22:03

## 2017-06-30 RX ADMIN — LEVALBUTEROL HYDROCHLORIDE 1.25 MG: 1.25 SOLUTION, CONCENTRATE RESPIRATORY (INHALATION) at 13:18

## 2017-06-30 RX ADMIN — GUAIFENESIN 600 MG: 600 TABLET, EXTENDED RELEASE ORAL at 09:03

## 2017-06-30 RX ADMIN — IPRATROPIUM BROMIDE 0.5 MG: 0.5 SOLUTION RESPIRATORY (INHALATION) at 21:15

## 2017-06-30 RX ADMIN — POTASSIUM CHLORIDE 20 MEQ: 200 INJECTION, SOLUTION INTRAVENOUS at 07:14

## 2017-06-30 RX ADMIN — HEPARIN SODIUM AND DEXTROSE 22 UNITS/KG/HR: 10000; 5 INJECTION INTRAVENOUS at 02:39

## 2017-06-30 RX ADMIN — CEFTRIAXONE 1000 MG: 1 INJECTION, POWDER, FOR SOLUTION INTRAMUSCULAR; INTRAVENOUS at 09:52

## 2017-06-30 RX ADMIN — CHLORDIAZEPOXIDE HYDROCHLORIDE 30 MG: 5 CAPSULE ORAL at 13:46

## 2017-06-30 RX ADMIN — POTASSIUM CHLORIDE 40 MEQ: 20 SOLUTION ORAL at 07:14

## 2017-06-30 RX ADMIN — IPRATROPIUM BROMIDE 0.5 MG: 0.5 SOLUTION RESPIRATORY (INHALATION) at 07:51

## 2017-06-30 RX ADMIN — PANTOPRAZOLE SODIUM 40 MG: 40 INJECTION, POWDER, FOR SOLUTION INTRAVENOUS at 22:03

## 2017-06-30 RX ADMIN — FOLIC ACID: 5 INJECTION, SOLUTION INTRAMUSCULAR; INTRAVENOUS; SUBCUTANEOUS at 12:11

## 2017-06-30 RX ADMIN — POTASSIUM CHLORIDE 40 MEQ: 20 SOLUTION ORAL at 12:11

## 2017-06-30 RX ADMIN — AZITHROMYCIN MONOHYDRATE 500 MG: 500 INJECTION, POWDER, LYOPHILIZED, FOR SOLUTION INTRAVENOUS at 10:42

## 2017-06-30 RX ADMIN — CEFTRIAXONE 1000 MG: 1 INJECTION, POWDER, FOR SOLUTION INTRAMUSCULAR; INTRAVENOUS at 20:25

## 2017-06-30 RX ADMIN — LORAZEPAM 2 MG: 2 INJECTION INTRAMUSCULAR; INTRAVENOUS at 01:29

## 2017-06-30 RX ADMIN — MULTIVITAMIN 15 ML: LIQUID ORAL at 09:10

## 2017-06-30 RX ADMIN — PANTOPRAZOLE SODIUM 40 MG: 40 INJECTION, POWDER, FOR SOLUTION INTRAVENOUS at 09:03

## 2017-06-30 RX ADMIN — CHLORDIAZEPOXIDE HYDROCHLORIDE 30 MG: 5 CAPSULE ORAL at 01:52

## 2017-06-30 RX ADMIN — METRONIDAZOLE 500 MG: 500 INJECTION, SOLUTION INTRAVENOUS at 01:29

## 2017-06-30 RX ADMIN — IPRATROPIUM BROMIDE 0.5 MG: 0.5 SOLUTION RESPIRATORY (INHALATION) at 13:18

## 2017-06-30 RX ADMIN — METRONIDAZOLE 500 MG: 500 INJECTION, SOLUTION INTRAVENOUS at 09:03

## 2017-06-30 RX ADMIN — LEVALBUTEROL HYDROCHLORIDE 1.25 MG: 1.25 SOLUTION, CONCENTRATE RESPIRATORY (INHALATION) at 21:16

## 2017-06-30 RX ADMIN — HEPARIN SODIUM AND DEXTROSE 24 UNITS/KG/HR: 10000; 5 INJECTION INTRAVENOUS at 17:20

## 2017-07-01 LAB
ABO GROUP BLD: NORMAL
AMMONIA PLAS-SCNC: 27 UMOL/L (ref 11–35)
ANION GAP SERPL CALCULATED.3IONS-SCNC: 8 MMOL/L (ref 4–13)
ANISOCYTOSIS BLD QL SMEAR: PRESENT
APTT PPP: 101 SECONDS (ref 23–35)
APTT PPP: 61 SECONDS (ref 23–35)
APTT PPP: 64 SECONDS (ref 23–35)
APTT PPP: 88 SECONDS (ref 23–35)
BASOPHILS # BLD MANUAL: 0 THOUSAND/UL (ref 0–0.1)
BASOPHILS NFR MAR MANUAL: 0 % (ref 0–1)
BLD GP AB SCN SERPL QL: NEGATIVE
BUN SERPL-MCNC: 6 MG/DL (ref 5–25)
CALCIUM SERPL-MCNC: 8.3 MG/DL (ref 8.3–10.1)
CHLORIDE SERPL-SCNC: 110 MMOL/L (ref 100–108)
CO2 SERPL-SCNC: 26 MMOL/L (ref 21–32)
CREAT SERPL-MCNC: 0.68 MG/DL (ref 0.6–1.3)
EOSINOPHIL # BLD MANUAL: 0.08 THOUSAND/UL (ref 0–0.4)
EOSINOPHIL NFR BLD MANUAL: 1 % (ref 0–6)
ERYTHROCYTE [DISTWIDTH] IN BLOOD BY AUTOMATED COUNT: 27.7 % (ref 11.6–15.1)
GFR SERPL CREATININE-BSD FRML MDRD: >60 ML/MIN/1.73SQ M
GLUCOSE SERPL-MCNC: 102 MG/DL (ref 65–140)
GLUCOSE SERPL-MCNC: 107 MG/DL (ref 65–140)
GLUCOSE SERPL-MCNC: 109 MG/DL (ref 65–140)
GLUCOSE SERPL-MCNC: 113 MG/DL (ref 65–140)
GLUCOSE SERPL-MCNC: 124 MG/DL (ref 65–140)
HCT VFR BLD AUTO: 21.7 % (ref 36.5–49.3)
HCT VFR BLD AUTO: 26.7 % (ref 36.5–49.3)
HGB BLD-MCNC: 6.6 G/DL (ref 12–17)
HGB BLD-MCNC: 8.4 G/DL (ref 12–17)
LYMPHOCYTES # BLD AUTO: 0.93 THOUSAND/UL (ref 0.6–4.47)
LYMPHOCYTES # BLD AUTO: 11 % (ref 14–44)
MAGNESIUM SERPL-MCNC: 1.5 MG/DL (ref 1.6–2.6)
MCH RBC QN AUTO: 28 PG (ref 26.8–34.3)
MCHC RBC AUTO-ENTMCNC: 30.4 G/DL (ref 31.4–37.4)
MCV RBC AUTO: 92 FL (ref 82–98)
MONOCYTES # BLD AUTO: 0.42 THOUSAND/UL (ref 0–1.22)
MONOCYTES NFR BLD: 5 % (ref 4–12)
NEUTROPHILS # BLD MANUAL: 6.91 THOUSAND/UL (ref 1.85–7.62)
NEUTS BAND NFR BLD MANUAL: 3 % (ref 0–8)
NEUTS SEG NFR BLD AUTO: 79 % (ref 43–75)
NRBC BLD AUTO-RTO: 12 /100 WBC (ref 0–2)
NRBC BLD AUTO-RTO: 6 /100 WBCS
PLATELET # BLD AUTO: 323 THOUSANDS/UL (ref 149–390)
PLATELET BLD QL SMEAR: ADEQUATE
PMV BLD AUTO: 12.2 FL (ref 8.9–12.7)
POLYCHROMASIA BLD QL SMEAR: PRESENT
POTASSIUM SERPL-SCNC: 3.5 MMOL/L (ref 3.5–5.3)
RBC # BLD AUTO: 2.36 MILLION/UL (ref 3.88–5.62)
RH BLD: POSITIVE
SODIUM SERPL-SCNC: 144 MMOL/L (ref 136–145)
SPECIMEN EXPIRATION DATE: NORMAL
TOTAL CELLS COUNTED SPEC: 100
VARIANT LYMPHS # BLD AUTO: 1 %
WBC # BLD AUTO: 8.43 THOUSAND/UL (ref 4.31–10.16)

## 2017-07-01 PROCEDURE — 94760 N-INVAS EAR/PLS OXIMETRY 1: CPT

## 2017-07-01 PROCEDURE — 85027 COMPLETE CBC AUTOMATED: CPT | Performed by: NURSE PRACTITIONER

## 2017-07-01 PROCEDURE — 82140 ASSAY OF AMMONIA: CPT | Performed by: FAMILY MEDICINE

## 2017-07-01 PROCEDURE — 86923 COMPATIBILITY TEST ELECTRIC: CPT

## 2017-07-01 PROCEDURE — 82948 REAGENT STRIP/BLOOD GLUCOSE: CPT

## 2017-07-01 PROCEDURE — 85007 BL SMEAR W/DIFF WBC COUNT: CPT | Performed by: NURSE PRACTITIONER

## 2017-07-01 PROCEDURE — 83735 ASSAY OF MAGNESIUM: CPT | Performed by: NURSE PRACTITIONER

## 2017-07-01 PROCEDURE — C9113 INJ PANTOPRAZOLE SODIUM, VIA: HCPCS | Performed by: NURSE PRACTITIONER

## 2017-07-01 PROCEDURE — 80048 BASIC METABOLIC PNL TOTAL CA: CPT | Performed by: NURSE PRACTITIONER

## 2017-07-01 PROCEDURE — 85730 THROMBOPLASTIN TIME PARTIAL: CPT | Performed by: FAMILY MEDICINE

## 2017-07-01 PROCEDURE — 85018 HEMOGLOBIN: CPT | Performed by: PHYSICIAN ASSISTANT

## 2017-07-01 PROCEDURE — P9021 RED BLOOD CELLS UNIT: HCPCS

## 2017-07-01 PROCEDURE — 86850 RBC ANTIBODY SCREEN: CPT | Performed by: PHYSICIAN ASSISTANT

## 2017-07-01 PROCEDURE — 85014 HEMATOCRIT: CPT | Performed by: PHYSICIAN ASSISTANT

## 2017-07-01 PROCEDURE — 94640 AIRWAY INHALATION TREATMENT: CPT

## 2017-07-01 PROCEDURE — 86901 BLOOD TYPING SEROLOGIC RH(D): CPT | Performed by: PHYSICIAN ASSISTANT

## 2017-07-01 PROCEDURE — 86900 BLOOD TYPING SEROLOGIC ABO: CPT | Performed by: PHYSICIAN ASSISTANT

## 2017-07-01 RX ORDER — DIPHENHYDRAMINE HCL 25 MG
25 TABLET ORAL ONCE
Status: COMPLETED | OUTPATIENT
Start: 2017-07-01 | End: 2017-07-01

## 2017-07-01 RX ORDER — FUROSEMIDE 10 MG/ML
20 INJECTION INTRAMUSCULAR; INTRAVENOUS ONCE
Status: COMPLETED | OUTPATIENT
Start: 2017-07-01 | End: 2017-07-01

## 2017-07-01 RX ADMIN — LORAZEPAM 2 MG: 2 INJECTION INTRAMUSCULAR; INTRAVENOUS at 21:57

## 2017-07-01 RX ADMIN — PANTOPRAZOLE SODIUM 40 MG: 40 INJECTION, POWDER, FOR SOLUTION INTRAVENOUS at 21:57

## 2017-07-01 RX ADMIN — LORAZEPAM 1 MG: 2 INJECTION INTRAMUSCULAR; INTRAVENOUS at 22:22

## 2017-07-01 RX ADMIN — PANTOPRAZOLE SODIUM 40 MG: 40 INJECTION, POWDER, FOR SOLUTION INTRAVENOUS at 09:17

## 2017-07-01 RX ADMIN — FOLIC ACID: 5 INJECTION, SOLUTION INTRAMUSCULAR; INTRAVENOUS; SUBCUTANEOUS at 09:17

## 2017-07-01 RX ADMIN — GUAIFENESIN 600 MG: 600 TABLET, EXTENDED RELEASE ORAL at 09:17

## 2017-07-01 RX ADMIN — MULTIVITAMIN 15 ML: LIQUID ORAL at 17:13

## 2017-07-01 RX ADMIN — IPRATROPIUM BROMIDE 0.5 MG: 0.5 SOLUTION RESPIRATORY (INHALATION) at 20:30

## 2017-07-01 RX ADMIN — IPRATROPIUM BROMIDE 0.5 MG: 0.5 SOLUTION RESPIRATORY (INHALATION) at 13:20

## 2017-07-01 RX ADMIN — FUROSEMIDE 20 MG: 10 INJECTION, SOLUTION INTRAMUSCULAR; INTRAVENOUS at 14:56

## 2017-07-01 RX ADMIN — MAGNESIUM SULFATE HEPTAHYDRATE 1 G: 1 INJECTION, SOLUTION INTRAVENOUS at 11:25

## 2017-07-01 RX ADMIN — CHLORDIAZEPOXIDE HYDROCHLORIDE 30 MG: 5 CAPSULE ORAL at 04:36

## 2017-07-01 RX ADMIN — METRONIDAZOLE 500 MG: 500 INJECTION, SOLUTION INTRAVENOUS at 17:08

## 2017-07-01 RX ADMIN — HEPARIN SODIUM AND DEXTROSE 24 UNITS/KG/HR: 10000; 5 INJECTION INTRAVENOUS at 10:06

## 2017-07-01 RX ADMIN — DIPHENHYDRAMINE HYDROCHLORIDE 25 MG: 25 TABLET ORAL at 19:57

## 2017-07-01 RX ADMIN — METRONIDAZOLE 500 MG: 500 INJECTION, SOLUTION INTRAVENOUS at 00:19

## 2017-07-01 RX ADMIN — CEFTRIAXONE 1000 MG: 1 INJECTION, POWDER, FOR SOLUTION INTRAMUSCULAR; INTRAVENOUS at 21:56

## 2017-07-01 RX ADMIN — LEVALBUTEROL HYDROCHLORIDE 1.25 MG: 1.25 SOLUTION, CONCENTRATE RESPIRATORY (INHALATION) at 20:30

## 2017-07-01 RX ADMIN — NICOTINE 21 MG: 21 PATCH, EXTENDED RELEASE TRANSDERMAL at 09:19

## 2017-07-01 RX ADMIN — CEFTRIAXONE 1000 MG: 1 INJECTION, POWDER, FOR SOLUTION INTRAMUSCULAR; INTRAVENOUS at 10:26

## 2017-07-01 RX ADMIN — IPRATROPIUM BROMIDE 0.5 MG: 0.5 SOLUTION RESPIRATORY (INHALATION) at 08:01

## 2017-07-01 RX ADMIN — LEVALBUTEROL HYDROCHLORIDE 1.25 MG: 1.25 SOLUTION, CONCENTRATE RESPIRATORY (INHALATION) at 13:20

## 2017-07-01 RX ADMIN — CHLORDIAZEPOXIDE HYDROCHLORIDE 30 MG: 5 CAPSULE ORAL at 14:55

## 2017-07-01 RX ADMIN — METRONIDAZOLE 500 MG: 500 INJECTION, SOLUTION INTRAVENOUS at 09:17

## 2017-07-01 RX ADMIN — GUAIFENESIN 600 MG: 600 TABLET, EXTENDED RELEASE ORAL at 21:56

## 2017-07-01 RX ADMIN — LEVALBUTEROL HYDROCHLORIDE 1.25 MG: 1.25 SOLUTION, CONCENTRATE RESPIRATORY (INHALATION) at 08:01

## 2017-07-02 LAB
ABO GROUP BLD BPU: NORMAL
ALBUMIN SERPL BCP-MCNC: 2 G/DL (ref 3.5–5)
ALP SERPL-CCNC: 162 U/L (ref 46–116)
ALT SERPL W P-5'-P-CCNC: 10 U/L (ref 12–78)
AMMONIA PLAS-SCNC: 34 UMOL/L (ref 11–35)
ANION GAP SERPL CALCULATED.3IONS-SCNC: 6 MMOL/L (ref 4–13)
APTT PPP: 71 SECONDS (ref 23–35)
AST SERPL W P-5'-P-CCNC: 31 U/L (ref 5–45)
BILIRUB SERPL-MCNC: 0.4 MG/DL (ref 0.2–1)
BPU ID: NORMAL
BUN SERPL-MCNC: 5 MG/DL (ref 5–25)
CALCIUM SERPL-MCNC: 8.2 MG/DL (ref 8.3–10.1)
CHLORIDE SERPL-SCNC: 107 MMOL/L (ref 100–108)
CO2 SERPL-SCNC: 29 MMOL/L (ref 21–32)
CREAT SERPL-MCNC: 0.65 MG/DL (ref 0.6–1.3)
ERYTHROCYTE [DISTWIDTH] IN BLOOD BY AUTOMATED COUNT: 25.4 % (ref 11.6–15.1)
GFR SERPL CREATININE-BSD FRML MDRD: >60 ML/MIN/1.73SQ M
GLUCOSE SERPL-MCNC: 100 MG/DL (ref 65–140)
GLUCOSE SERPL-MCNC: 108 MG/DL (ref 65–140)
GLUCOSE SERPL-MCNC: 121 MG/DL (ref 65–140)
GLUCOSE SERPL-MCNC: 98 MG/DL (ref 65–140)
GLUCOSE SERPL-MCNC: 99 MG/DL (ref 65–140)
HCT VFR BLD AUTO: 26.1 % (ref 36.5–49.3)
HGB BLD-MCNC: 8.3 G/DL (ref 12–17)
MCH RBC QN AUTO: 28.6 PG (ref 26.8–34.3)
MCHC RBC AUTO-ENTMCNC: 31.8 G/DL (ref 31.4–37.4)
MCV RBC AUTO: 90 FL (ref 82–98)
PLATELET # BLD AUTO: 390 THOUSANDS/UL (ref 149–390)
PMV BLD AUTO: 11.7 FL (ref 8.9–12.7)
POTASSIUM SERPL-SCNC: 3.2 MMOL/L (ref 3.5–5.3)
PROT SERPL-MCNC: 5.3 G/DL (ref 6.4–8.2)
RBC # BLD AUTO: 2.9 MILLION/UL (ref 3.88–5.62)
SODIUM SERPL-SCNC: 142 MMOL/L (ref 136–145)
UNIT DISPENSE STATUS: NORMAL
UNIT PRODUCT CODE: NORMAL
UNIT RH: NORMAL
WBC # BLD AUTO: 7.75 THOUSAND/UL (ref 4.31–10.16)

## 2017-07-02 PROCEDURE — C9113 INJ PANTOPRAZOLE SODIUM, VIA: HCPCS | Performed by: NURSE PRACTITIONER

## 2017-07-02 PROCEDURE — 82140 ASSAY OF AMMONIA: CPT | Performed by: FAMILY MEDICINE

## 2017-07-02 PROCEDURE — 85730 THROMBOPLASTIN TIME PARTIAL: CPT | Performed by: FAMILY MEDICINE

## 2017-07-02 PROCEDURE — 94640 AIRWAY INHALATION TREATMENT: CPT

## 2017-07-02 PROCEDURE — 85027 COMPLETE CBC AUTOMATED: CPT | Performed by: FAMILY MEDICINE

## 2017-07-02 PROCEDURE — 94760 N-INVAS EAR/PLS OXIMETRY 1: CPT

## 2017-07-02 PROCEDURE — 82948 REAGENT STRIP/BLOOD GLUCOSE: CPT

## 2017-07-02 PROCEDURE — 80053 COMPREHEN METABOLIC PANEL: CPT | Performed by: FAMILY MEDICINE

## 2017-07-02 RX ORDER — POTASSIUM CHLORIDE 14.9 MG/ML
20 INJECTION INTRAVENOUS ONCE
Status: DISCONTINUED | OUTPATIENT
Start: 2017-07-02 | End: 2017-07-04

## 2017-07-02 RX ORDER — POTASSIUM CHLORIDE 14.9 MG/ML
20 INJECTION INTRAVENOUS
Status: COMPLETED | OUTPATIENT
Start: 2017-07-02 | End: 2017-07-05

## 2017-07-02 RX ADMIN — NICOTINE 21 MG: 21 PATCH, EXTENDED RELEASE TRANSDERMAL at 10:04

## 2017-07-02 RX ADMIN — POLYETHYLENE GLYCOL 3350, SODIUM SULFATE ANHYDROUS, SODIUM BICARBONATE, SODIUM CHLORIDE, POTASSIUM CHLORIDE 3000 ML: 236; 22.74; 6.74; 5.86; 2.97 POWDER, FOR SOLUTION ORAL at 18:36

## 2017-07-02 RX ADMIN — PANTOPRAZOLE SODIUM 40 MG: 40 INJECTION, POWDER, FOR SOLUTION INTRAVENOUS at 20:15

## 2017-07-02 RX ADMIN — LEVALBUTEROL HYDROCHLORIDE 1.25 MG: 1.25 SOLUTION, CONCENTRATE RESPIRATORY (INHALATION) at 13:52

## 2017-07-02 RX ADMIN — LEVALBUTEROL HYDROCHLORIDE 1.25 MG: 1.25 SOLUTION, CONCENTRATE RESPIRATORY (INHALATION) at 20:23

## 2017-07-02 RX ADMIN — HEPARIN SODIUM AND DEXTROSE 24 UNITS/KG/HR: 10000; 5 INJECTION INTRAVENOUS at 00:15

## 2017-07-02 RX ADMIN — HEPARIN SODIUM AND DEXTROSE 24 UNITS/KG/HR: 10000; 5 INJECTION INTRAVENOUS at 16:25

## 2017-07-02 RX ADMIN — CEFTRIAXONE 1000 MG: 1 INJECTION, POWDER, FOR SOLUTION INTRAMUSCULAR; INTRAVENOUS at 20:14

## 2017-07-02 RX ADMIN — IPRATROPIUM BROMIDE 0.5 MG: 0.5 SOLUTION RESPIRATORY (INHALATION) at 07:44

## 2017-07-02 RX ADMIN — PANTOPRAZOLE SODIUM 40 MG: 40 INJECTION, POWDER, FOR SOLUTION INTRAVENOUS at 10:20

## 2017-07-02 RX ADMIN — CHLORDIAZEPOXIDE HYDROCHLORIDE 30 MG: 5 CAPSULE ORAL at 15:48

## 2017-07-02 RX ADMIN — CEFTRIAXONE 1000 MG: 1 INJECTION, POWDER, FOR SOLUTION INTRAMUSCULAR; INTRAVENOUS at 10:05

## 2017-07-02 RX ADMIN — GUAIFENESIN 600 MG: 600 TABLET, EXTENDED RELEASE ORAL at 10:06

## 2017-07-02 RX ADMIN — MULTIVITAMIN 15 ML: LIQUID ORAL at 13:32

## 2017-07-02 RX ADMIN — FOLIC ACID: 5 INJECTION, SOLUTION INTRAMUSCULAR; INTRAVENOUS; SUBCUTANEOUS at 10:08

## 2017-07-02 RX ADMIN — POTASSIUM CHLORIDE 20 MEQ: 200 INJECTION, SOLUTION INTRAVENOUS at 13:46

## 2017-07-02 RX ADMIN — IPRATROPIUM BROMIDE 0.5 MG: 0.5 SOLUTION RESPIRATORY (INHALATION) at 20:23

## 2017-07-02 RX ADMIN — IPRATROPIUM BROMIDE 0.5 MG: 0.5 SOLUTION RESPIRATORY (INHALATION) at 13:52

## 2017-07-02 RX ADMIN — POTASSIUM CHLORIDE 20 MEQ: 200 INJECTION, SOLUTION INTRAVENOUS at 11:37

## 2017-07-02 RX ADMIN — GUAIFENESIN 600 MG: 600 TABLET, EXTENDED RELEASE ORAL at 20:15

## 2017-07-02 RX ADMIN — LEVALBUTEROL HYDROCHLORIDE 1.25 MG: 1.25 SOLUTION, CONCENTRATE RESPIRATORY (INHALATION) at 07:44

## 2017-07-02 RX ADMIN — METRONIDAZOLE 500 MG: 500 INJECTION, SOLUTION INTRAVENOUS at 02:10

## 2017-07-02 RX ADMIN — METRONIDAZOLE 500 MG: 500 INJECTION, SOLUTION INTRAVENOUS at 10:52

## 2017-07-02 RX ADMIN — METRONIDAZOLE 500 MG: 500 INJECTION, SOLUTION INTRAVENOUS at 23:05

## 2017-07-02 RX ADMIN — METRONIDAZOLE 500 MG: 500 INJECTION, SOLUTION INTRAVENOUS at 17:58

## 2017-07-02 RX ADMIN — CHLORDIAZEPOXIDE HYDROCHLORIDE 30 MG: 5 CAPSULE ORAL at 02:11

## 2017-07-03 ENCOUNTER — GENERIC CONVERSION - ENCOUNTER (OUTPATIENT)
Dept: OTHER | Facility: OTHER | Age: 52
End: 2017-07-03

## 2017-07-03 ENCOUNTER — ANESTHESIA (INPATIENT)
Dept: PERIOP | Facility: HOSPITAL | Age: 52
DRG: 175 | End: 2017-07-03

## 2017-07-03 ENCOUNTER — ANESTHESIA EVENT (INPATIENT)
Dept: PERIOP | Facility: HOSPITAL | Age: 52
DRG: 175 | End: 2017-07-03

## 2017-07-03 PROBLEM — J96.00 ACUTE RESPIRATORY FAILURE (HCC): Status: RESOLVED | Noted: 2017-06-27 | Resolved: 2017-07-03

## 2017-07-03 PROBLEM — R55 SYNCOPE: Status: RESOLVED | Noted: 2017-06-27 | Resolved: 2017-07-03

## 2017-07-03 PROBLEM — E87.6 HYPOKALEMIA: Status: RESOLVED | Noted: 2017-06-27 | Resolved: 2017-07-03

## 2017-07-03 LAB
ALBUMIN SERPL BCP-MCNC: 2.2 G/DL (ref 3.5–5)
ALP SERPL-CCNC: 173 U/L (ref 46–116)
ALT SERPL W P-5'-P-CCNC: 11 U/L (ref 12–78)
ANION GAP SERPL CALCULATED.3IONS-SCNC: 8 MMOL/L (ref 4–13)
APTT PPP: 27 SECONDS (ref 23–35)
APTT PPP: 59 SECONDS (ref 23–35)
AST SERPL W P-5'-P-CCNC: 31 U/L (ref 5–45)
BILIRUB SERPL-MCNC: 0.4 MG/DL (ref 0.2–1)
BUN SERPL-MCNC: 4 MG/DL (ref 5–25)
CALCIUM SERPL-MCNC: 8.3 MG/DL (ref 8.3–10.1)
CHLORIDE SERPL-SCNC: 106 MMOL/L (ref 100–108)
CO2 SERPL-SCNC: 29 MMOL/L (ref 21–32)
CREAT SERPL-MCNC: 0.67 MG/DL (ref 0.6–1.3)
ERYTHROCYTE [DISTWIDTH] IN BLOOD BY AUTOMATED COUNT: 24.4 % (ref 11.6–15.1)
ERYTHROCYTE [DISTWIDTH] IN BLOOD BY AUTOMATED COUNT: 24.9 % (ref 11.6–15.1)
FERRITIN SERPL-MCNC: 1639 NG/ML (ref 8–388)
FOLATE SERPL-MCNC: 6.5 NG/ML (ref 3.1–17.5)
GFR SERPL CREATININE-BSD FRML MDRD: >60 ML/MIN/1.73SQ M
GLUCOSE SERPL-MCNC: 101 MG/DL (ref 65–140)
GLUCOSE SERPL-MCNC: 103 MG/DL (ref 65–140)
GLUCOSE SERPL-MCNC: 110 MG/DL (ref 65–140)
GLUCOSE SERPL-MCNC: 97 MG/DL (ref 65–140)
HCT VFR BLD AUTO: 27.5 % (ref 36.5–49.3)
HCT VFR BLD AUTO: 27.6 % (ref 36.5–49.3)
HGB BLD-MCNC: 8.5 G/DL (ref 12–17)
HGB BLD-MCNC: 8.7 G/DL (ref 12–17)
INR PPP: 0.97 (ref 0.86–1.16)
INR PPP: 1.02 (ref 0.86–1.16)
IRON SATN MFR SERPL: 48 %
IRON SERPL-MCNC: 93 UG/DL (ref 65–175)
MAGNESIUM SERPL-MCNC: 1.3 MG/DL (ref 1.6–2.6)
MCH RBC QN AUTO: 28 PG (ref 26.8–34.3)
MCH RBC QN AUTO: 28.3 PG (ref 26.8–34.3)
MCHC RBC AUTO-ENTMCNC: 30.9 G/DL (ref 31.4–37.4)
MCHC RBC AUTO-ENTMCNC: 31.5 G/DL (ref 31.4–37.4)
MCV RBC AUTO: 90 FL (ref 82–98)
MCV RBC AUTO: 91 FL (ref 82–98)
PLATELET # BLD AUTO: 439 THOUSANDS/UL (ref 149–390)
PLATELET # BLD AUTO: 466 THOUSANDS/UL (ref 149–390)
PMV BLD AUTO: 11.1 FL (ref 8.9–12.7)
PMV BLD AUTO: 11.8 FL (ref 8.9–12.7)
POTASSIUM SERPL-SCNC: 3.6 MMOL/L (ref 3.5–5.3)
PROT SERPL-MCNC: 5.6 G/DL (ref 6.4–8.2)
PROTHROMBIN TIME: 13 SECONDS (ref 12.1–14.4)
PROTHROMBIN TIME: 13.6 SECONDS (ref 12.1–14.4)
RBC # BLD AUTO: 3.04 MILLION/UL (ref 3.88–5.62)
RBC # BLD AUTO: 3.07 MILLION/UL (ref 3.88–5.62)
SODIUM SERPL-SCNC: 143 MMOL/L (ref 136–145)
TIBC SERPL-MCNC: 192 UG/DL (ref 250–450)
VIT B12 SERPL-MCNC: 747 PG/ML (ref 100–900)
WBC # BLD AUTO: 6.79 THOUSAND/UL (ref 4.31–10.16)
WBC # BLD AUTO: 7.73 THOUSAND/UL (ref 4.31–10.16)

## 2017-07-03 PROCEDURE — 82607 VITAMIN B-12: CPT | Performed by: INTERNAL MEDICINE

## 2017-07-03 PROCEDURE — 85027 COMPLETE CBC AUTOMATED: CPT | Performed by: INTERNAL MEDICINE

## 2017-07-03 PROCEDURE — 85730 THROMBOPLASTIN TIME PARTIAL: CPT | Performed by: INTERNAL MEDICINE

## 2017-07-03 PROCEDURE — C9113 INJ PANTOPRAZOLE SODIUM, VIA: HCPCS | Performed by: NURSE PRACTITIONER

## 2017-07-03 PROCEDURE — 85610 PROTHROMBIN TIME: CPT | Performed by: FAMILY MEDICINE

## 2017-07-03 PROCEDURE — 82728 ASSAY OF FERRITIN: CPT | Performed by: INTERNAL MEDICINE

## 2017-07-03 PROCEDURE — 83735 ASSAY OF MAGNESIUM: CPT | Performed by: FAMILY MEDICINE

## 2017-07-03 PROCEDURE — 0DBM8ZX EXCISION OF DESCENDING COLON, VIA NATURAL OR ARTIFICIAL OPENING ENDOSCOPIC, DIAGNOSTIC: ICD-10-PCS | Performed by: INTERNAL MEDICINE

## 2017-07-03 PROCEDURE — 82948 REAGENT STRIP/BLOOD GLUCOSE: CPT

## 2017-07-03 PROCEDURE — 85610 PROTHROMBIN TIME: CPT | Performed by: INTERNAL MEDICINE

## 2017-07-03 PROCEDURE — 82746 ASSAY OF FOLIC ACID SERUM: CPT | Performed by: INTERNAL MEDICINE

## 2017-07-03 PROCEDURE — 94760 N-INVAS EAR/PLS OXIMETRY 1: CPT

## 2017-07-03 PROCEDURE — 88305 TISSUE EXAM BY PATHOLOGIST: CPT | Performed by: INTERNAL MEDICINE

## 2017-07-03 PROCEDURE — 94640 AIRWAY INHALATION TREATMENT: CPT

## 2017-07-03 PROCEDURE — 83550 IRON BINDING TEST: CPT | Performed by: INTERNAL MEDICINE

## 2017-07-03 PROCEDURE — 83540 ASSAY OF IRON: CPT | Performed by: INTERNAL MEDICINE

## 2017-07-03 PROCEDURE — 85027 COMPLETE CBC AUTOMATED: CPT | Performed by: FAMILY MEDICINE

## 2017-07-03 PROCEDURE — 85730 THROMBOPLASTIN TIME PARTIAL: CPT | Performed by: FAMILY MEDICINE

## 2017-07-03 PROCEDURE — 80053 COMPREHEN METABOLIC PANEL: CPT | Performed by: FAMILY MEDICINE

## 2017-07-03 RX ORDER — SODIUM CHLORIDE, SODIUM LACTATE, POTASSIUM CHLORIDE, CALCIUM CHLORIDE 600; 310; 30; 20 MG/100ML; MG/100ML; MG/100ML; MG/100ML
INJECTION, SOLUTION INTRAVENOUS CONTINUOUS PRN
Status: DISCONTINUED | OUTPATIENT
Start: 2017-07-03 | End: 2017-07-03 | Stop reason: SURG

## 2017-07-03 RX ORDER — HEPARIN SODIUM 10000 [USP'U]/100ML
3-30 INJECTION, SOLUTION INTRAVENOUS
Status: DISCONTINUED | OUTPATIENT
Start: 2017-07-03 | End: 2017-07-10 | Stop reason: HOSPADM

## 2017-07-03 RX ORDER — MAGNESIUM SULFATE HEPTAHYDRATE 40 MG/ML
2 INJECTION, SOLUTION INTRAVENOUS ONCE
Status: COMPLETED | OUTPATIENT
Start: 2017-07-03 | End: 2017-07-04

## 2017-07-03 RX ORDER — HEPARIN SODIUM 1000 [USP'U]/ML
6000 INJECTION, SOLUTION INTRAVENOUS; SUBCUTANEOUS AS NEEDED
Status: DISCONTINUED | OUTPATIENT
Start: 2017-07-03 | End: 2017-07-10 | Stop reason: HOSPADM

## 2017-07-03 RX ORDER — CHLORDIAZEPOXIDE HYDROCHLORIDE 5 MG/1
20 CAPSULE, GELATIN COATED ORAL EVERY 12 HOURS
Status: DISCONTINUED | OUTPATIENT
Start: 2017-07-03 | End: 2017-07-08

## 2017-07-03 RX ORDER — PROPOFOL 10 MG/ML
INJECTION, EMULSION INTRAVENOUS AS NEEDED
Status: DISCONTINUED | OUTPATIENT
Start: 2017-07-03 | End: 2017-07-03 | Stop reason: SURG

## 2017-07-03 RX ORDER — WARFARIN SODIUM 5 MG/1
5 TABLET ORAL
Status: DISCONTINUED | OUTPATIENT
Start: 2017-07-03 | End: 2017-07-06

## 2017-07-03 RX ORDER — HEPARIN SODIUM 1000 [USP'U]/ML
3000 INJECTION, SOLUTION INTRAVENOUS; SUBCUTANEOUS AS NEEDED
Status: DISCONTINUED | OUTPATIENT
Start: 2017-07-03 | End: 2017-07-10 | Stop reason: HOSPADM

## 2017-07-03 RX ADMIN — CHLORDIAZEPOXIDE HYDROCHLORIDE 20 MG: 5 CAPSULE ORAL at 16:00

## 2017-07-03 RX ADMIN — LEVALBUTEROL HYDROCHLORIDE 1.25 MG: 1.25 SOLUTION, CONCENTRATE RESPIRATORY (INHALATION) at 19:44

## 2017-07-03 RX ADMIN — IPRATROPIUM BROMIDE 0.5 MG: 0.5 SOLUTION RESPIRATORY (INHALATION) at 14:06

## 2017-07-03 RX ADMIN — POLYETHYLENE GLYCOL 3350, SODIUM SULFATE ANHYDROUS, SODIUM BICARBONATE, SODIUM CHLORIDE, POTASSIUM CHLORIDE 1000 ML: 236; 22.74; 6.74; 5.86; 2.97 POWDER, FOR SOLUTION ORAL at 08:59

## 2017-07-03 RX ADMIN — HEPARIN SODIUM AND DEXTROSE 18 UNITS/KG/HR: 10000; 5 INJECTION INTRAVENOUS at 18:50

## 2017-07-03 RX ADMIN — LEVALBUTEROL HYDROCHLORIDE 1.25 MG: 1.25 SOLUTION, CONCENTRATE RESPIRATORY (INHALATION) at 08:40

## 2017-07-03 RX ADMIN — CEFTRIAXONE 1000 MG: 1 INJECTION, POWDER, FOR SOLUTION INTRAMUSCULAR; INTRAVENOUS at 08:59

## 2017-07-03 RX ADMIN — NICOTINE 21 MG: 21 PATCH, EXTENDED RELEASE TRANSDERMAL at 09:01

## 2017-07-03 RX ADMIN — PROPOFOL 20 MG: 10 INJECTION, EMULSION INTRAVENOUS at 15:21

## 2017-07-03 RX ADMIN — IPRATROPIUM BROMIDE 0.5 MG: 0.5 SOLUTION RESPIRATORY (INHALATION) at 08:40

## 2017-07-03 RX ADMIN — WARFARIN SODIUM 5 MG: 5 TABLET ORAL at 17:02

## 2017-07-03 RX ADMIN — HEPARIN SODIUM 2000 UNITS: 1000 INJECTION, SOLUTION INTRAVENOUS; SUBCUTANEOUS at 08:39

## 2017-07-03 RX ADMIN — IPRATROPIUM BROMIDE 0.5 MG: 0.5 SOLUTION RESPIRATORY (INHALATION) at 19:44

## 2017-07-03 RX ADMIN — PANTOPRAZOLE SODIUM 40 MG: 40 INJECTION, POWDER, FOR SOLUTION INTRAVENOUS at 09:01

## 2017-07-03 RX ADMIN — PANTOPRAZOLE SODIUM 40 MG: 40 INJECTION, POWDER, FOR SOLUTION INTRAVENOUS at 21:11

## 2017-07-03 RX ADMIN — PROPOFOL 20 MG: 10 INJECTION, EMULSION INTRAVENOUS at 15:26

## 2017-07-03 RX ADMIN — PROPOFOL 80 MG: 10 INJECTION, EMULSION INTRAVENOUS at 15:18

## 2017-07-03 RX ADMIN — MULTIVITAMIN 15 ML: LIQUID ORAL at 10:40

## 2017-07-03 RX ADMIN — FOLIC ACID: 5 INJECTION, SOLUTION INTRAMUSCULAR; INTRAVENOUS; SUBCUTANEOUS at 08:00

## 2017-07-03 RX ADMIN — LEVALBUTEROL HYDROCHLORIDE 1.25 MG: 1.25 SOLUTION, CONCENTRATE RESPIRATORY (INHALATION) at 14:06

## 2017-07-03 RX ADMIN — GUAIFENESIN 600 MG: 600 TABLET, EXTENDED RELEASE ORAL at 21:11

## 2017-07-03 RX ADMIN — CHLORDIAZEPOXIDE HYDROCHLORIDE 30 MG: 5 CAPSULE ORAL at 01:07

## 2017-07-03 RX ADMIN — GUAIFENESIN 600 MG: 600 TABLET, EXTENDED RELEASE ORAL at 09:03

## 2017-07-03 RX ADMIN — MAGNESIUM SULFATE HEPTAHYDRATE 2 G: 40 INJECTION, SOLUTION INTRAVENOUS at 11:33

## 2017-07-03 RX ADMIN — SODIUM CHLORIDE, SODIUM LACTATE, POTASSIUM CHLORIDE, AND CALCIUM CHLORIDE: .6; .31; .03; .02 INJECTION, SOLUTION INTRAVENOUS at 14:38

## 2017-07-03 RX ADMIN — METRONIDAZOLE 500 MG: 500 INJECTION, SOLUTION INTRAVENOUS at 08:55

## 2017-07-04 LAB
ANION GAP SERPL CALCULATED.3IONS-SCNC: 8 MMOL/L (ref 4–13)
APTT PPP: 43 SECONDS (ref 23–35)
APTT PPP: 44 SECONDS (ref 23–35)
APTT PPP: 62 SECONDS (ref 23–35)
APTT PPP: 64 SECONDS (ref 23–35)
BUN SERPL-MCNC: 5 MG/DL (ref 5–25)
CALCIUM SERPL-MCNC: 8.2 MG/DL (ref 8.3–10.1)
CHLORIDE SERPL-SCNC: 105 MMOL/L (ref 100–108)
CO2 SERPL-SCNC: 30 MMOL/L (ref 21–32)
CREAT SERPL-MCNC: 0.64 MG/DL (ref 0.6–1.3)
ERYTHROCYTE [DISTWIDTH] IN BLOOD BY AUTOMATED COUNT: 24.6 % (ref 11.6–15.1)
GFR SERPL CREATININE-BSD FRML MDRD: >60 ML/MIN/1.73SQ M
GLUCOSE SERPL-MCNC: 100 MG/DL (ref 65–140)
GLUCOSE SERPL-MCNC: 100 MG/DL (ref 65–140)
GLUCOSE SERPL-MCNC: 107 MG/DL (ref 65–140)
GLUCOSE SERPL-MCNC: 108 MG/DL (ref 65–140)
GLUCOSE SERPL-MCNC: 121 MG/DL (ref 65–140)
GLUCOSE SERPL-MCNC: 98 MG/DL (ref 65–140)
HCT VFR BLD AUTO: 27.2 % (ref 36.5–49.3)
HGB BLD-MCNC: 8.4 G/DL (ref 12–17)
INR PPP: 1 (ref 0.86–1.16)
MAGNESIUM SERPL-MCNC: 1.5 MG/DL (ref 1.6–2.6)
MAGNESIUM SERPL-MCNC: 1.7 MG/DL (ref 1.6–2.6)
MCH RBC QN AUTO: 28.3 PG (ref 26.8–34.3)
MCHC RBC AUTO-ENTMCNC: 30.9 G/DL (ref 31.4–37.4)
MCV RBC AUTO: 92 FL (ref 82–98)
PLATELET # BLD AUTO: 465 THOUSANDS/UL (ref 149–390)
PMV BLD AUTO: 12.3 FL (ref 8.9–12.7)
POTASSIUM SERPL-SCNC: 3.2 MMOL/L (ref 3.5–5.3)
POTASSIUM SERPL-SCNC: 3.8 MMOL/L (ref 3.5–5.3)
PROTHROMBIN TIME: 13.4 SECONDS (ref 12.1–14.4)
RBC # BLD AUTO: 2.97 MILLION/UL (ref 3.88–5.62)
SODIUM SERPL-SCNC: 143 MMOL/L (ref 136–145)
WBC # BLD AUTO: 7.63 THOUSAND/UL (ref 4.31–10.16)

## 2017-07-04 PROCEDURE — 82948 REAGENT STRIP/BLOOD GLUCOSE: CPT

## 2017-07-04 PROCEDURE — 84132 ASSAY OF SERUM POTASSIUM: CPT | Performed by: INTERNAL MEDICINE

## 2017-07-04 PROCEDURE — 85730 THROMBOPLASTIN TIME PARTIAL: CPT | Performed by: FAMILY MEDICINE

## 2017-07-04 PROCEDURE — C9113 INJ PANTOPRAZOLE SODIUM, VIA: HCPCS | Performed by: NURSE PRACTITIONER

## 2017-07-04 PROCEDURE — 85027 COMPLETE CBC AUTOMATED: CPT | Performed by: FAMILY MEDICINE

## 2017-07-04 PROCEDURE — 94640 AIRWAY INHALATION TREATMENT: CPT

## 2017-07-04 PROCEDURE — 85730 THROMBOPLASTIN TIME PARTIAL: CPT | Performed by: INTERNAL MEDICINE

## 2017-07-04 PROCEDURE — 94760 N-INVAS EAR/PLS OXIMETRY 1: CPT

## 2017-07-04 PROCEDURE — 80048 BASIC METABOLIC PNL TOTAL CA: CPT | Performed by: FAMILY MEDICINE

## 2017-07-04 PROCEDURE — 83735 ASSAY OF MAGNESIUM: CPT | Performed by: INTERNAL MEDICINE

## 2017-07-04 PROCEDURE — 85610 PROTHROMBIN TIME: CPT | Performed by: FAMILY MEDICINE

## 2017-07-04 PROCEDURE — 83735 ASSAY OF MAGNESIUM: CPT | Performed by: FAMILY MEDICINE

## 2017-07-04 RX ORDER — POTASSIUM CHLORIDE 20 MEQ/1
40 TABLET, EXTENDED RELEASE ORAL ONCE
Status: COMPLETED | OUTPATIENT
Start: 2017-07-04 | End: 2017-07-04

## 2017-07-04 RX ORDER — MAGNESIUM SULFATE HEPTAHYDRATE 40 MG/ML
2 INJECTION, SOLUTION INTRAVENOUS ONCE
Status: DISCONTINUED | OUTPATIENT
Start: 2017-07-04 | End: 2017-07-04

## 2017-07-04 RX ORDER — MAGNESIUM SULFATE HEPTAHYDRATE 40 MG/ML
2 INJECTION, SOLUTION INTRAVENOUS ONCE
Status: COMPLETED | OUTPATIENT
Start: 2017-07-04 | End: 2017-07-04

## 2017-07-04 RX ADMIN — CHLORDIAZEPOXIDE HYDROCHLORIDE 20 MG: 5 CAPSULE ORAL at 03:40

## 2017-07-04 RX ADMIN — GUAIFENESIN 600 MG: 600 TABLET, EXTENDED RELEASE ORAL at 21:50

## 2017-07-04 RX ADMIN — HEPARIN SODIUM AND DEXTROSE 20 UNITS/KG/HR: 10000; 5 INJECTION INTRAVENOUS at 13:03

## 2017-07-04 RX ADMIN — PANTOPRAZOLE SODIUM 40 MG: 40 INJECTION, POWDER, FOR SOLUTION INTRAVENOUS at 08:52

## 2017-07-04 RX ADMIN — CHLORDIAZEPOXIDE HYDROCHLORIDE 20 MG: 5 CAPSULE ORAL at 16:00

## 2017-07-04 RX ADMIN — FOLIC ACID: 5 INJECTION, SOLUTION INTRAMUSCULAR; INTRAVENOUS; SUBCUTANEOUS at 11:50

## 2017-07-04 RX ADMIN — WARFARIN SODIUM 5 MG: 5 TABLET ORAL at 17:40

## 2017-07-04 RX ADMIN — MULTIVITAMIN 15 ML: LIQUID ORAL at 10:01

## 2017-07-04 RX ADMIN — MAGNESIUM SULFATE HEPTAHYDRATE 2 G: 40 INJECTION, SOLUTION INTRAVENOUS at 10:00

## 2017-07-04 RX ADMIN — LEVALBUTEROL HYDROCHLORIDE 1.25 MG: 1.25 SOLUTION, CONCENTRATE RESPIRATORY (INHALATION) at 07:41

## 2017-07-04 RX ADMIN — IPRATROPIUM BROMIDE 0.5 MG: 0.5 SOLUTION RESPIRATORY (INHALATION) at 13:45

## 2017-07-04 RX ADMIN — LEVALBUTEROL HYDROCHLORIDE 1.25 MG: 1.25 SOLUTION, CONCENTRATE RESPIRATORY (INHALATION) at 19:19

## 2017-07-04 RX ADMIN — IPRATROPIUM BROMIDE 0.5 MG: 0.5 SOLUTION RESPIRATORY (INHALATION) at 07:41

## 2017-07-04 RX ADMIN — IPRATROPIUM BROMIDE 0.5 MG: 0.5 SOLUTION RESPIRATORY (INHALATION) at 19:18

## 2017-07-04 RX ADMIN — HEPARIN SODIUM 3000 UNITS: 1000 INJECTION, SOLUTION INTRAVENOUS; SUBCUTANEOUS at 15:38

## 2017-07-04 RX ADMIN — NICOTINE 21 MG: 21 PATCH, EXTENDED RELEASE TRANSDERMAL at 08:55

## 2017-07-04 RX ADMIN — PANTOPRAZOLE SODIUM 40 MG: 40 INJECTION, POWDER, FOR SOLUTION INTRAVENOUS at 21:51

## 2017-07-04 RX ADMIN — LEVALBUTEROL HYDROCHLORIDE 1.25 MG: 1.25 SOLUTION, CONCENTRATE RESPIRATORY (INHALATION) at 13:45

## 2017-07-04 RX ADMIN — HEPARIN SODIUM 3000 UNITS: 1000 INJECTION, SOLUTION INTRAVENOUS; SUBCUTANEOUS at 01:32

## 2017-07-04 RX ADMIN — POTASSIUM CHLORIDE 40 MEQ: 1500 TABLET, EXTENDED RELEASE ORAL at 08:52

## 2017-07-04 RX ADMIN — GUAIFENESIN 600 MG: 600 TABLET, EXTENDED RELEASE ORAL at 08:52

## 2017-07-05 PROBLEM — F10.931 ALCOHOL WITHDRAWAL SYNDROME, WITH DELIRIUM (HCC): Chronic | Status: ACTIVE | Noted: 2017-06-27

## 2017-07-05 PROBLEM — I10 HYPERTENSION: Status: ACTIVE | Noted: 2017-07-05

## 2017-07-05 PROBLEM — F10.231 ALCOHOL WITHDRAWAL SYNDROME, WITH DELIRIUM (HCC): Chronic | Status: ACTIVE | Noted: 2017-06-27

## 2017-07-05 PROBLEM — E80.6 HYPERBILIRUBINEMIA: Status: RESOLVED | Noted: 2017-06-27 | Resolved: 2017-07-05

## 2017-07-05 PROBLEM — D62 ACUTE BLOOD LOSS ANEMIA: Status: RESOLVED | Noted: 2017-06-27 | Resolved: 2017-07-05

## 2017-07-05 PROBLEM — J18.9 CAP (COMMUNITY ACQUIRED PNEUMONIA): Status: RESOLVED | Noted: 2017-06-27 | Resolved: 2017-07-05

## 2017-07-05 LAB
ANION GAP SERPL CALCULATED.3IONS-SCNC: 8 MMOL/L (ref 4–13)
APTT PPP: 62 SECONDS (ref 23–35)
BUN SERPL-MCNC: 3 MG/DL (ref 5–25)
CALCIUM SERPL-MCNC: 8.5 MG/DL (ref 8.3–10.1)
CHLORIDE SERPL-SCNC: 103 MMOL/L (ref 100–108)
CO2 SERPL-SCNC: 31 MMOL/L (ref 21–32)
CREAT SERPL-MCNC: 0.71 MG/DL (ref 0.6–1.3)
ERYTHROCYTE [DISTWIDTH] IN BLOOD BY AUTOMATED COUNT: 24.1 % (ref 11.6–15.1)
GFR SERPL CREATININE-BSD FRML MDRD: >60 ML/MIN/1.73SQ M
GLUCOSE SERPL-MCNC: 118 MG/DL (ref 65–140)
GLUCOSE SERPL-MCNC: 134 MG/DL (ref 65–140)
GLUCOSE SERPL-MCNC: 75 MG/DL (ref 65–140)
GLUCOSE SERPL-MCNC: 97 MG/DL (ref 65–140)
HCT VFR BLD AUTO: 28.7 % (ref 36.5–49.3)
HGB BLD-MCNC: 8.9 G/DL (ref 12–17)
INR PPP: 1.02 (ref 0.86–1.16)
MAGNESIUM SERPL-MCNC: 1.5 MG/DL (ref 1.6–2.6)
MCH RBC QN AUTO: 28.4 PG (ref 26.8–34.3)
MCHC RBC AUTO-ENTMCNC: 31 G/DL (ref 31.4–37.4)
MCV RBC AUTO: 92 FL (ref 82–98)
PLATELET # BLD AUTO: 455 THOUSANDS/UL (ref 149–390)
PMV BLD AUTO: 11.3 FL (ref 8.9–12.7)
POTASSIUM SERPL-SCNC: 3.4 MMOL/L (ref 3.5–5.3)
PROTHROMBIN TIME: 13.6 SECONDS (ref 12.1–14.4)
RBC # BLD AUTO: 3.13 MILLION/UL (ref 3.88–5.62)
SODIUM SERPL-SCNC: 142 MMOL/L (ref 136–145)
WBC # BLD AUTO: 7.03 THOUSAND/UL (ref 4.31–10.16)

## 2017-07-05 PROCEDURE — 94640 AIRWAY INHALATION TREATMENT: CPT

## 2017-07-05 PROCEDURE — 82948 REAGENT STRIP/BLOOD GLUCOSE: CPT

## 2017-07-05 PROCEDURE — 97110 THERAPEUTIC EXERCISES: CPT

## 2017-07-05 PROCEDURE — 85730 THROMBOPLASTIN TIME PARTIAL: CPT | Performed by: INTERNAL MEDICINE

## 2017-07-05 PROCEDURE — 85027 COMPLETE CBC AUTOMATED: CPT | Performed by: INTERNAL MEDICINE

## 2017-07-05 PROCEDURE — 85610 PROTHROMBIN TIME: CPT | Performed by: INTERNAL MEDICINE

## 2017-07-05 PROCEDURE — 92526 ORAL FUNCTION THERAPY: CPT

## 2017-07-05 PROCEDURE — 83735 ASSAY OF MAGNESIUM: CPT | Performed by: INTERNAL MEDICINE

## 2017-07-05 PROCEDURE — 94760 N-INVAS EAR/PLS OXIMETRY 1: CPT

## 2017-07-05 PROCEDURE — 80048 BASIC METABOLIC PNL TOTAL CA: CPT | Performed by: INTERNAL MEDICINE

## 2017-07-05 PROCEDURE — 97116 GAIT TRAINING THERAPY: CPT

## 2017-07-05 RX ORDER — PANTOPRAZOLE SODIUM 40 MG/1
40 TABLET, DELAYED RELEASE ORAL
Status: DISCONTINUED | OUTPATIENT
Start: 2017-07-05 | End: 2017-07-10 | Stop reason: HOSPADM

## 2017-07-05 RX ORDER — LEVALBUTEROL 1.25 MG/.5ML
1.25 SOLUTION, CONCENTRATE RESPIRATORY (INHALATION) EVERY 6 HOURS PRN
Status: DISCONTINUED | OUTPATIENT
Start: 2017-07-05 | End: 2017-07-10 | Stop reason: HOSPADM

## 2017-07-05 RX ORDER — THIAMINE MONONITRATE (VIT B1) 100 MG
100 TABLET ORAL DAILY
Status: DISCONTINUED | OUTPATIENT
Start: 2017-07-06 | End: 2017-07-10 | Stop reason: HOSPADM

## 2017-07-05 RX ORDER — POTASSIUM CHLORIDE 20 MEQ/1
20 TABLET, EXTENDED RELEASE ORAL ONCE
Status: COMPLETED | OUTPATIENT
Start: 2017-07-05 | End: 2017-07-05

## 2017-07-05 RX ORDER — FOLIC ACID 1 MG/1
1 TABLET ORAL DAILY
Status: DISCONTINUED | OUTPATIENT
Start: 2017-07-06 | End: 2017-07-10 | Stop reason: HOSPADM

## 2017-07-05 RX ORDER — LEVALBUTEROL 1.25 MG/.5ML
1.25 SOLUTION, CONCENTRATE RESPIRATORY (INHALATION)
Status: DISCONTINUED | OUTPATIENT
Start: 2017-07-05 | End: 2017-07-10 | Stop reason: HOSPADM

## 2017-07-05 RX ORDER — CLONIDINE HYDROCHLORIDE 0.1 MG/1
0.1 TABLET ORAL EVERY 12 HOURS SCHEDULED
Status: DISCONTINUED | OUTPATIENT
Start: 2017-07-05 | End: 2017-07-10 | Stop reason: HOSPADM

## 2017-07-05 RX ORDER — MAGNESIUM SULFATE HEPTAHYDRATE 40 MG/ML
2 INJECTION, SOLUTION INTRAVENOUS ONCE
Status: COMPLETED | OUTPATIENT
Start: 2017-07-05 | End: 2017-07-05

## 2017-07-05 RX ADMIN — GUAIFENESIN 600 MG: 600 TABLET, EXTENDED RELEASE ORAL at 09:52

## 2017-07-05 RX ADMIN — CHLORDIAZEPOXIDE HYDROCHLORIDE 20 MG: 5 CAPSULE ORAL at 03:18

## 2017-07-05 RX ADMIN — WARFARIN SODIUM 5 MG: 5 TABLET ORAL at 17:20

## 2017-07-05 RX ADMIN — PANTOPRAZOLE SODIUM 40 MG: 40 TABLET, DELAYED RELEASE ORAL at 09:51

## 2017-07-05 RX ADMIN — IPRATROPIUM BROMIDE 0.5 MG: 0.5 SOLUTION RESPIRATORY (INHALATION) at 08:13

## 2017-07-05 RX ADMIN — MAGNESIUM SULFATE HEPTAHYDRATE 2 G: 40 INJECTION, SOLUTION INTRAVENOUS at 11:19

## 2017-07-05 RX ADMIN — LEVALBUTEROL HYDROCHLORIDE 1.25 MG: 1.25 SOLUTION, CONCENTRATE RESPIRATORY (INHALATION) at 20:13

## 2017-07-05 RX ADMIN — FOLIC ACID: 5 INJECTION, SOLUTION INTRAMUSCULAR; INTRAVENOUS; SUBCUTANEOUS at 09:53

## 2017-07-05 RX ADMIN — GUAIFENESIN 600 MG: 600 TABLET, EXTENDED RELEASE ORAL at 20:50

## 2017-07-05 RX ADMIN — IPRATROPIUM BROMIDE 0.5 MG: 0.5 SOLUTION RESPIRATORY (INHALATION) at 20:13

## 2017-07-05 RX ADMIN — HEPARIN SODIUM AND DEXTROSE 22 UNITS/KG/HR: 10000; 5 INJECTION INTRAVENOUS at 03:19

## 2017-07-05 RX ADMIN — MULTIVITAMIN 15 ML: LIQUID ORAL at 09:53

## 2017-07-05 RX ADMIN — HEPARIN SODIUM AND DEXTROSE 22 UNITS/KG/HR: 10000; 5 INJECTION INTRAVENOUS at 18:09

## 2017-07-05 RX ADMIN — LEVALBUTEROL HYDROCHLORIDE 1.25 MG: 1.25 SOLUTION, CONCENTRATE RESPIRATORY (INHALATION) at 08:13

## 2017-07-05 RX ADMIN — PANTOPRAZOLE SODIUM 40 MG: 40 TABLET, DELAYED RELEASE ORAL at 17:20

## 2017-07-05 RX ADMIN — CLONIDINE HYDROCHLORIDE 0.1 MG: 0.1 TABLET ORAL at 20:50

## 2017-07-05 RX ADMIN — POTASSIUM CHLORIDE 20 MEQ: 1500 TABLET, EXTENDED RELEASE ORAL at 11:19

## 2017-07-05 RX ADMIN — CHLORDIAZEPOXIDE HYDROCHLORIDE 20 MG: 5 CAPSULE ORAL at 17:20

## 2017-07-06 LAB
ANION GAP SERPL CALCULATED.3IONS-SCNC: 8 MMOL/L (ref 4–13)
APTT PPP: 67 SECONDS (ref 23–35)
BASOPHILS # BLD AUTO: 0.04 THOUSANDS/ΜL (ref 0–0.1)
BASOPHILS NFR BLD AUTO: 1 % (ref 0–1)
BUN SERPL-MCNC: 4 MG/DL (ref 5–25)
CALCIUM SERPL-MCNC: 8.8 MG/DL (ref 8.3–10.1)
CHLORIDE SERPL-SCNC: 104 MMOL/L (ref 100–108)
CO2 SERPL-SCNC: 31 MMOL/L (ref 21–32)
CREAT SERPL-MCNC: 0.68 MG/DL (ref 0.6–1.3)
EOSINOPHIL # BLD AUTO: 0.13 THOUSAND/ΜL (ref 0–0.61)
EOSINOPHIL NFR BLD AUTO: 2 % (ref 0–6)
ERYTHROCYTE [DISTWIDTH] IN BLOOD BY AUTOMATED COUNT: 23.7 % (ref 11.6–15.1)
GFR SERPL CREATININE-BSD FRML MDRD: >60 ML/MIN/1.73SQ M
GLUCOSE SERPL-MCNC: 104 MG/DL (ref 65–140)
GLUCOSE SERPL-MCNC: 107 MG/DL (ref 65–140)
GLUCOSE SERPL-MCNC: 114 MG/DL (ref 65–140)
GLUCOSE SERPL-MCNC: 93 MG/DL (ref 65–140)
GLUCOSE SERPL-MCNC: 99 MG/DL (ref 65–140)
HCT VFR BLD AUTO: 29.2 % (ref 36.5–49.3)
HGB BLD-MCNC: 8.9 G/DL (ref 12–17)
INR PPP: 1 (ref 0.86–1.16)
LYMPHOCYTES # BLD AUTO: 2.23 THOUSANDS/ΜL (ref 0.6–4.47)
LYMPHOCYTES NFR BLD AUTO: 31 % (ref 14–44)
MAGNESIUM SERPL-MCNC: 1.8 MG/DL (ref 1.6–2.6)
MCH RBC QN AUTO: 28.2 PG (ref 26.8–34.3)
MCHC RBC AUTO-ENTMCNC: 30.5 G/DL (ref 31.4–37.4)
MCV RBC AUTO: 92 FL (ref 82–98)
MONOCYTES # BLD AUTO: 0.54 THOUSAND/ΜL (ref 0.17–1.22)
MONOCYTES NFR BLD AUTO: 8 % (ref 4–12)
NEUTROPHILS # BLD AUTO: 4.26 THOUSANDS/ΜL (ref 1.85–7.62)
NEUTS SEG NFR BLD AUTO: 59 % (ref 43–75)
NRBC BLD AUTO-RTO: 0 /100 WBCS
PLATELET # BLD AUTO: 500 THOUSANDS/UL (ref 149–390)
PMV BLD AUTO: 12.2 FL (ref 8.9–12.7)
POTASSIUM SERPL-SCNC: 3.4 MMOL/L (ref 3.5–5.3)
PROTHROMBIN TIME: 13.4 SECONDS (ref 12.1–14.4)
RBC # BLD AUTO: 3.16 MILLION/UL (ref 3.88–5.62)
SODIUM SERPL-SCNC: 143 MMOL/L (ref 136–145)
WBC # BLD AUTO: 7.24 THOUSAND/UL (ref 4.31–10.16)

## 2017-07-06 PROCEDURE — 85025 COMPLETE CBC W/AUTO DIFF WBC: CPT | Performed by: INTERNAL MEDICINE

## 2017-07-06 PROCEDURE — 80048 BASIC METABOLIC PNL TOTAL CA: CPT | Performed by: INTERNAL MEDICINE

## 2017-07-06 PROCEDURE — 83735 ASSAY OF MAGNESIUM: CPT | Performed by: INTERNAL MEDICINE

## 2017-07-06 PROCEDURE — 85610 PROTHROMBIN TIME: CPT | Performed by: INTERNAL MEDICINE

## 2017-07-06 PROCEDURE — 85730 THROMBOPLASTIN TIME PARTIAL: CPT | Performed by: INTERNAL MEDICINE

## 2017-07-06 PROCEDURE — 82948 REAGENT STRIP/BLOOD GLUCOSE: CPT

## 2017-07-06 PROCEDURE — 94760 N-INVAS EAR/PLS OXIMETRY 1: CPT

## 2017-07-06 PROCEDURE — 94640 AIRWAY INHALATION TREATMENT: CPT

## 2017-07-06 RX ORDER — POTASSIUM CHLORIDE 20 MEQ/1
20 TABLET, EXTENDED RELEASE ORAL ONCE
Status: COMPLETED | OUTPATIENT
Start: 2017-07-06 | End: 2017-07-06

## 2017-07-06 RX ORDER — WARFARIN SODIUM 6 MG/1
6 TABLET ORAL
Status: DISCONTINUED | OUTPATIENT
Start: 2017-07-06 | End: 2017-07-08

## 2017-07-06 RX ADMIN — FOLIC ACID 1 MG: 1 TABLET ORAL at 08:30

## 2017-07-06 RX ADMIN — CLONIDINE HYDROCHLORIDE 0.1 MG: 0.1 TABLET ORAL at 19:53

## 2017-07-06 RX ADMIN — LEVALBUTEROL HYDROCHLORIDE 1.25 MG: 1.25 SOLUTION, CONCENTRATE RESPIRATORY (INHALATION) at 20:00

## 2017-07-06 RX ADMIN — POTASSIUM CHLORIDE 20 MEQ: 1500 TABLET, EXTENDED RELEASE ORAL at 10:49

## 2017-07-06 RX ADMIN — NICOTINE 21 MG: 21 PATCH, EXTENDED RELEASE TRANSDERMAL at 08:30

## 2017-07-06 RX ADMIN — CLONIDINE HYDROCHLORIDE 0.1 MG: 0.1 TABLET ORAL at 21:06

## 2017-07-06 RX ADMIN — IPRATROPIUM BROMIDE 0.5 MG: 0.5 SOLUTION RESPIRATORY (INHALATION) at 20:00

## 2017-07-06 RX ADMIN — CHLORDIAZEPOXIDE HYDROCHLORIDE 20 MG: 5 CAPSULE ORAL at 16:08

## 2017-07-06 RX ADMIN — IPRATROPIUM BROMIDE 0.5 MG: 0.5 SOLUTION RESPIRATORY (INHALATION) at 07:46

## 2017-07-06 RX ADMIN — PANTOPRAZOLE SODIUM 40 MG: 40 TABLET, DELAYED RELEASE ORAL at 06:35

## 2017-07-06 RX ADMIN — MULTIVITAMIN 15 ML: LIQUID ORAL at 08:31

## 2017-07-06 RX ADMIN — WARFARIN SODIUM 6 MG: 6 TABLET ORAL at 17:11

## 2017-07-06 RX ADMIN — CHLORDIAZEPOXIDE HYDROCHLORIDE 20 MG: 5 CAPSULE ORAL at 03:47

## 2017-07-06 RX ADMIN — GUAIFENESIN 600 MG: 600 TABLET, EXTENDED RELEASE ORAL at 08:30

## 2017-07-06 RX ADMIN — Medication 100 MG: at 08:29

## 2017-07-06 RX ADMIN — PANTOPRAZOLE SODIUM 40 MG: 40 TABLET, DELAYED RELEASE ORAL at 16:08

## 2017-07-06 RX ADMIN — Medication 400 MG: at 10:49

## 2017-07-06 RX ADMIN — HEPARIN SODIUM AND DEXTROSE 22 UNITS/KG/HR: 10000; 5 INJECTION INTRAVENOUS at 10:45

## 2017-07-06 RX ADMIN — LEVALBUTEROL HYDROCHLORIDE 1.25 MG: 1.25 SOLUTION, CONCENTRATE RESPIRATORY (INHALATION) at 07:46

## 2017-07-06 RX ADMIN — GUAIFENESIN 600 MG: 600 TABLET, EXTENDED RELEASE ORAL at 21:07

## 2017-07-06 RX ADMIN — CLONIDINE HYDROCHLORIDE 0.1 MG: 0.1 TABLET ORAL at 08:30

## 2017-07-06 RX ADMIN — GUAIFENESIN 600 MG: 600 TABLET, EXTENDED RELEASE ORAL at 19:53

## 2017-07-07 LAB
APTT PPP: 61 SECONDS (ref 23–35)
GLUCOSE SERPL-MCNC: 110 MG/DL (ref 65–140)
GLUCOSE SERPL-MCNC: 150 MG/DL (ref 65–140)
GLUCOSE SERPL-MCNC: 188 MG/DL (ref 65–140)
GLUCOSE SERPL-MCNC: 95 MG/DL (ref 65–140)

## 2017-07-07 PROCEDURE — 94640 AIRWAY INHALATION TREATMENT: CPT

## 2017-07-07 PROCEDURE — 82948 REAGENT STRIP/BLOOD GLUCOSE: CPT

## 2017-07-07 PROCEDURE — 94760 N-INVAS EAR/PLS OXIMETRY 1: CPT

## 2017-07-07 PROCEDURE — 85730 THROMBOPLASTIN TIME PARTIAL: CPT | Performed by: NURSE PRACTITIONER

## 2017-07-07 RX ADMIN — NICOTINE 21 MG: 21 PATCH, EXTENDED RELEASE TRANSDERMAL at 08:30

## 2017-07-07 RX ADMIN — MULTIVITAMIN 15 ML: LIQUID ORAL at 08:29

## 2017-07-07 RX ADMIN — LEVALBUTEROL HYDROCHLORIDE 1.25 MG: 1.25 SOLUTION, CONCENTRATE RESPIRATORY (INHALATION) at 08:56

## 2017-07-07 RX ADMIN — CLONIDINE HYDROCHLORIDE 0.1 MG: 0.1 TABLET ORAL at 08:29

## 2017-07-07 RX ADMIN — FOLIC ACID 1 MG: 1 TABLET ORAL at 08:29

## 2017-07-07 RX ADMIN — Medication 100 MG: at 08:29

## 2017-07-07 RX ADMIN — HEPARIN SODIUM AND DEXTROSE 16.5 UNITS/KG/HR: 10000; 5 INJECTION INTRAVENOUS at 00:25

## 2017-07-07 RX ADMIN — PANTOPRAZOLE SODIUM 40 MG: 40 TABLET, DELAYED RELEASE ORAL at 08:27

## 2017-07-07 RX ADMIN — IPRATROPIUM BROMIDE 0.5 MG: 0.5 SOLUTION RESPIRATORY (INHALATION) at 08:56

## 2017-07-07 RX ADMIN — HEPARIN SODIUM AND DEXTROSE 22 UNITS/KG/HR: 10000; 5 INJECTION INTRAVENOUS at 18:13

## 2017-07-07 RX ADMIN — CHLORDIAZEPOXIDE HYDROCHLORIDE 20 MG: 5 CAPSULE ORAL at 04:17

## 2017-07-07 RX ADMIN — CLONIDINE HYDROCHLORIDE 0.1 MG: 0.1 TABLET ORAL at 20:22

## 2017-07-07 RX ADMIN — WARFARIN SODIUM 6 MG: 6 TABLET ORAL at 18:14

## 2017-07-07 RX ADMIN — CHLORDIAZEPOXIDE HYDROCHLORIDE 20 MG: 5 CAPSULE ORAL at 16:23

## 2017-07-07 RX ADMIN — LEVALBUTEROL HYDROCHLORIDE 1.25 MG: 1.25 SOLUTION, CONCENTRATE RESPIRATORY (INHALATION) at 20:04

## 2017-07-07 RX ADMIN — PANTOPRAZOLE SODIUM 40 MG: 40 TABLET, DELAYED RELEASE ORAL at 16:23

## 2017-07-07 RX ADMIN — GUAIFENESIN 600 MG: 600 TABLET, EXTENDED RELEASE ORAL at 08:29

## 2017-07-07 RX ADMIN — IPRATROPIUM BROMIDE 0.5 MG: 0.5 SOLUTION RESPIRATORY (INHALATION) at 20:05

## 2017-07-08 LAB
APTT PPP: 62 SECONDS (ref 23–35)
APTT PPP: 62 SECONDS (ref 23–35)
APTT PPP: 98 SECONDS (ref 23–35)
GLUCOSE SERPL-MCNC: 107 MG/DL (ref 65–140)
GLUCOSE SERPL-MCNC: 109 MG/DL (ref 65–140)
GLUCOSE SERPL-MCNC: 130 MG/DL (ref 65–140)
GLUCOSE SERPL-MCNC: 132 MG/DL (ref 65–140)
INR PPP: 1.1 (ref 0.86–1.16)
PROTHROMBIN TIME: 14.5 SECONDS (ref 12.1–14.4)

## 2017-07-08 PROCEDURE — 85730 THROMBOPLASTIN TIME PARTIAL: CPT | Performed by: INTERNAL MEDICINE

## 2017-07-08 PROCEDURE — 82948 REAGENT STRIP/BLOOD GLUCOSE: CPT

## 2017-07-08 PROCEDURE — 94640 AIRWAY INHALATION TREATMENT: CPT

## 2017-07-08 PROCEDURE — 85610 PROTHROMBIN TIME: CPT | Performed by: INTERNAL MEDICINE

## 2017-07-08 PROCEDURE — 94760 N-INVAS EAR/PLS OXIMETRY 1: CPT

## 2017-07-08 RX ORDER — WARFARIN SODIUM 7.5 MG/1
7.5 TABLET ORAL
Status: DISCONTINUED | OUTPATIENT
Start: 2017-07-09 | End: 2017-07-10

## 2017-07-08 RX ORDER — POTASSIUM CHLORIDE 20 MEQ/1
20 TABLET, EXTENDED RELEASE ORAL ONCE
Status: COMPLETED | OUTPATIENT
Start: 2017-07-08 | End: 2017-07-08

## 2017-07-08 RX ORDER — FERROUS SULFATE 325(65) MG
325 TABLET ORAL
Status: DISCONTINUED | OUTPATIENT
Start: 2017-07-08 | End: 2017-07-10 | Stop reason: HOSPADM

## 2017-07-08 RX ORDER — WARFARIN SODIUM 3 MG/1
1.5 TABLET ORAL
Status: COMPLETED | OUTPATIENT
Start: 2017-07-08 | End: 2017-07-08

## 2017-07-08 RX ORDER — CHLORDIAZEPOXIDE HYDROCHLORIDE 5 MG/1
10 CAPSULE, GELATIN COATED ORAL EVERY 12 HOURS
Status: DISCONTINUED | OUTPATIENT
Start: 2017-07-08 | End: 2017-07-09

## 2017-07-08 RX ADMIN — POTASSIUM CHLORIDE 20 MEQ: 1500 TABLET, EXTENDED RELEASE ORAL at 20:51

## 2017-07-08 RX ADMIN — IPRATROPIUM BROMIDE 0.5 MG: 0.5 SOLUTION RESPIRATORY (INHALATION) at 20:23

## 2017-07-08 RX ADMIN — LEVALBUTEROL HYDROCHLORIDE 1.25 MG: 1.25 SOLUTION, CONCENTRATE RESPIRATORY (INHALATION) at 20:23

## 2017-07-08 RX ADMIN — FERROUS SULFATE TAB 325 MG (65 MG ELEMENTAL FE) 325 MG: 325 (65 FE) TAB at 17:07

## 2017-07-08 RX ADMIN — CHLORDIAZEPOXIDE HYDROCHLORIDE 10 MG: 5 CAPSULE ORAL at 17:08

## 2017-07-08 RX ADMIN — MULTIVITAMIN 15 ML: LIQUID ORAL at 08:06

## 2017-07-08 RX ADMIN — IPRATROPIUM BROMIDE 0.5 MG: 0.5 SOLUTION RESPIRATORY (INHALATION) at 08:59

## 2017-07-08 RX ADMIN — WARFARIN SODIUM 6 MG: 6 TABLET ORAL at 17:07

## 2017-07-08 RX ADMIN — CLONIDINE HYDROCHLORIDE 0.1 MG: 0.1 TABLET ORAL at 08:05

## 2017-07-08 RX ADMIN — WARFARIN SODIUM 1.5 MG: 3 TABLET ORAL at 18:29

## 2017-07-08 RX ADMIN — PANTOPRAZOLE SODIUM 40 MG: 40 TABLET, DELAYED RELEASE ORAL at 06:20

## 2017-07-08 RX ADMIN — GUAIFENESIN 600 MG: 600 TABLET, EXTENDED RELEASE ORAL at 20:51

## 2017-07-08 RX ADMIN — FOLIC ACID 1 MG: 1 TABLET ORAL at 08:05

## 2017-07-08 RX ADMIN — HEPARIN SODIUM AND DEXTROSE 20 UNITS/KG/HR: 10000; 5 INJECTION INTRAVENOUS at 08:05

## 2017-07-08 RX ADMIN — CLONIDINE HYDROCHLORIDE 0.1 MG: 0.1 TABLET ORAL at 20:51

## 2017-07-08 RX ADMIN — Medication 100 MG: at 08:05

## 2017-07-08 RX ADMIN — HEPARIN SODIUM AND DEXTROSE 20 UNITS/KG/HR: 10000; 5 INJECTION INTRAVENOUS at 07:01

## 2017-07-08 RX ADMIN — PANTOPRAZOLE SODIUM 40 MG: 40 TABLET, DELAYED RELEASE ORAL at 17:07

## 2017-07-08 RX ADMIN — LEVALBUTEROL HYDROCHLORIDE 1.25 MG: 1.25 SOLUTION, CONCENTRATE RESPIRATORY (INHALATION) at 08:59

## 2017-07-08 RX ADMIN — CHLORDIAZEPOXIDE HYDROCHLORIDE 20 MG: 5 CAPSULE ORAL at 03:16

## 2017-07-08 RX ADMIN — GUAIFENESIN 600 MG: 600 TABLET, EXTENDED RELEASE ORAL at 08:05

## 2017-07-09 PROBLEM — F10.231 ALCOHOL WITHDRAWAL SYNDROME, WITH DELIRIUM (HCC): Status: RESOLVED | Noted: 2017-06-27 | Resolved: 2017-07-09

## 2017-07-09 PROBLEM — E87.6 HYPOKALEMIA: Status: RESOLVED | Noted: 2017-06-27 | Resolved: 2017-07-09

## 2017-07-09 PROBLEM — F10.931 ALCOHOL WITHDRAWAL SYNDROME, WITH DELIRIUM (HCC): Status: RESOLVED | Noted: 2017-06-27 | Resolved: 2017-07-09

## 2017-07-09 LAB
ANION GAP SERPL CALCULATED.3IONS-SCNC: 7 MMOL/L (ref 4–13)
APTT PPP: 66 SECONDS (ref 23–35)
BUN SERPL-MCNC: 7 MG/DL (ref 5–25)
CALCIUM SERPL-MCNC: 9 MG/DL (ref 8.3–10.1)
CHLORIDE SERPL-SCNC: 105 MMOL/L (ref 100–108)
CO2 SERPL-SCNC: 31 MMOL/L (ref 21–32)
CREAT SERPL-MCNC: 0.76 MG/DL (ref 0.6–1.3)
ERYTHROCYTE [DISTWIDTH] IN BLOOD BY AUTOMATED COUNT: 22.2 % (ref 11.6–15.1)
GFR SERPL CREATININE-BSD FRML MDRD: >60 ML/MIN/1.73SQ M
GLUCOSE SERPL-MCNC: 93 MG/DL (ref 65–140)
HCT VFR BLD AUTO: 29.4 % (ref 36.5–49.3)
HGB BLD-MCNC: 9 G/DL (ref 12–17)
INR PPP: 1.12 (ref 0.86–1.16)
MAGNESIUM SERPL-MCNC: 1.6 MG/DL (ref 1.6–2.6)
MCH RBC QN AUTO: 28.7 PG (ref 26.8–34.3)
MCHC RBC AUTO-ENTMCNC: 30.6 G/DL (ref 31.4–37.4)
MCV RBC AUTO: 94 FL (ref 82–98)
PLATELET # BLD AUTO: 399 THOUSANDS/UL (ref 149–390)
PMV BLD AUTO: 10.5 FL (ref 8.9–12.7)
POTASSIUM SERPL-SCNC: 3.8 MMOL/L (ref 3.5–5.3)
PROTHROMBIN TIME: 14.7 SECONDS (ref 12.1–14.4)
RBC # BLD AUTO: 3.14 MILLION/UL (ref 3.88–5.62)
SODIUM SERPL-SCNC: 143 MMOL/L (ref 136–145)
WBC # BLD AUTO: 6.41 THOUSAND/UL (ref 4.31–10.16)

## 2017-07-09 PROCEDURE — 85610 PROTHROMBIN TIME: CPT | Performed by: INTERNAL MEDICINE

## 2017-07-09 PROCEDURE — 94760 N-INVAS EAR/PLS OXIMETRY 1: CPT

## 2017-07-09 PROCEDURE — 85027 COMPLETE CBC AUTOMATED: CPT | Performed by: INTERNAL MEDICINE

## 2017-07-09 PROCEDURE — 80048 BASIC METABOLIC PNL TOTAL CA: CPT | Performed by: INTERNAL MEDICINE

## 2017-07-09 PROCEDURE — 85730 THROMBOPLASTIN TIME PARTIAL: CPT | Performed by: INTERNAL MEDICINE

## 2017-07-09 PROCEDURE — 83735 ASSAY OF MAGNESIUM: CPT | Performed by: INTERNAL MEDICINE

## 2017-07-09 PROCEDURE — 94640 AIRWAY INHALATION TREATMENT: CPT

## 2017-07-09 RX ORDER — CHLORDIAZEPOXIDE HYDROCHLORIDE 5 MG/1
5 CAPSULE, GELATIN COATED ORAL EVERY 12 HOURS
Status: DISCONTINUED | OUTPATIENT
Start: 2017-07-10 | End: 2017-07-10 | Stop reason: HOSPADM

## 2017-07-09 RX ADMIN — HEPARIN SODIUM AND DEXTROSE 20 UNITS/KG/HR: 10000; 5 INJECTION INTRAVENOUS at 00:12

## 2017-07-09 RX ADMIN — PANTOPRAZOLE SODIUM 40 MG: 40 TABLET, DELAYED RELEASE ORAL at 16:47

## 2017-07-09 RX ADMIN — WARFARIN SODIUM 7.5 MG: 7.5 TABLET ORAL at 17:07

## 2017-07-09 RX ADMIN — CHLORDIAZEPOXIDE HYDROCHLORIDE 10 MG: 5 CAPSULE ORAL at 03:01

## 2017-07-09 RX ADMIN — LEVALBUTEROL HYDROCHLORIDE 1.25 MG: 1.25 SOLUTION, CONCENTRATE RESPIRATORY (INHALATION) at 20:53

## 2017-07-09 RX ADMIN — HEPARIN SODIUM AND DEXTROSE 20 UNITS/KG/HR: 10000; 5 INJECTION INTRAVENOUS at 17:14

## 2017-07-09 RX ADMIN — MULTIVITAMIN 15 ML: LIQUID ORAL at 09:16

## 2017-07-09 RX ADMIN — CLONIDINE HYDROCHLORIDE 0.1 MG: 0.1 TABLET ORAL at 20:46

## 2017-07-09 RX ADMIN — LEVALBUTEROL HYDROCHLORIDE 1.25 MG: 1.25 SOLUTION, CONCENTRATE RESPIRATORY (INHALATION) at 07:32

## 2017-07-09 RX ADMIN — Medication 100 MG: at 09:15

## 2017-07-09 RX ADMIN — IPRATROPIUM BROMIDE 0.5 MG: 0.5 SOLUTION RESPIRATORY (INHALATION) at 20:53

## 2017-07-09 RX ADMIN — CLONIDINE HYDROCHLORIDE 0.1 MG: 0.1 TABLET ORAL at 09:15

## 2017-07-09 RX ADMIN — GUAIFENESIN 600 MG: 600 TABLET, EXTENDED RELEASE ORAL at 09:15

## 2017-07-09 RX ADMIN — CHLORDIAZEPOXIDE HYDROCHLORIDE 10 MG: 5 CAPSULE ORAL at 16:41

## 2017-07-09 RX ADMIN — FOLIC ACID 1 MG: 1 TABLET ORAL at 09:15

## 2017-07-09 RX ADMIN — PANTOPRAZOLE SODIUM 40 MG: 40 TABLET, DELAYED RELEASE ORAL at 06:09

## 2017-07-09 RX ADMIN — FERROUS SULFATE TAB 325 MG (65 MG ELEMENTAL FE) 325 MG: 325 (65 FE) TAB at 08:15

## 2017-07-09 RX ADMIN — IPRATROPIUM BROMIDE 0.5 MG: 0.5 SOLUTION RESPIRATORY (INHALATION) at 07:33

## 2017-07-09 RX ADMIN — GUAIFENESIN 600 MG: 600 TABLET, EXTENDED RELEASE ORAL at 20:46

## 2017-07-10 VITALS
BODY MASS INDEX: 21.92 KG/M2 | RESPIRATION RATE: 18 BRPM | SYSTOLIC BLOOD PRESSURE: 140 MMHG | WEIGHT: 161.82 LBS | OXYGEN SATURATION: 96 % | TEMPERATURE: 98 F | HEIGHT: 72 IN | HEART RATE: 57 BPM | DIASTOLIC BLOOD PRESSURE: 60 MMHG

## 2017-07-10 LAB
ALBUMIN SERPL BCP-MCNC: 2.7 G/DL (ref 3.5–5)
APTT PPP: 62 SECONDS (ref 23–35)
INR PPP: 1.17 (ref 0.86–1.16)
PROTHROMBIN TIME: 15.2 SECONDS (ref 12.1–14.4)

## 2017-07-10 PROCEDURE — 85730 THROMBOPLASTIN TIME PARTIAL: CPT | Performed by: INTERNAL MEDICINE

## 2017-07-10 PROCEDURE — 82040 ASSAY OF SERUM ALBUMIN: CPT | Performed by: INTERNAL MEDICINE

## 2017-07-10 PROCEDURE — 85610 PROTHROMBIN TIME: CPT | Performed by: INTERNAL MEDICINE

## 2017-07-10 PROCEDURE — 94640 AIRWAY INHALATION TREATMENT: CPT

## 2017-07-10 PROCEDURE — 94760 N-INVAS EAR/PLS OXIMETRY 1: CPT

## 2017-07-10 RX ORDER — CLONIDINE HYDROCHLORIDE 0.1 MG/1
0.1 TABLET ORAL EVERY 12 HOURS SCHEDULED
Qty: 60 TABLET | Refills: 0 | Status: SHIPPED | OUTPATIENT
Start: 2017-07-10 | End: 2018-12-30

## 2017-07-10 RX ORDER — PANTOPRAZOLE SODIUM 40 MG/1
40 TABLET, DELAYED RELEASE ORAL
Qty: 60 TABLET | Refills: 0 | Status: SHIPPED | OUTPATIENT
Start: 2017-07-10 | End: 2018-12-30

## 2017-07-10 RX ORDER — WARFARIN SODIUM 10 MG/1
TABLET ORAL
Qty: 10 TABLET | Refills: 0 | Status: SHIPPED | OUTPATIENT
Start: 2017-07-10 | End: 2018-12-30

## 2017-07-10 RX ORDER — FERROUS SULFATE 325(65) MG
325 TABLET ORAL
Qty: 30 TABLET | Refills: 0 | Status: SHIPPED | OUTPATIENT
Start: 2017-07-10 | End: 2018-12-30

## 2017-07-10 RX ORDER — WARFARIN SODIUM 5 MG/1
10 TABLET ORAL
Status: DISCONTINUED | OUTPATIENT
Start: 2017-07-10 | End: 2017-07-10 | Stop reason: HOSPADM

## 2017-07-10 RX ORDER — WARFARIN SODIUM 5 MG/1
TABLET ORAL
Qty: 10 TABLET | Refills: 0 | Status: SHIPPED | OUTPATIENT
Start: 2017-07-10 | End: 2017-07-10

## 2017-07-10 RX ADMIN — CLONIDINE HYDROCHLORIDE 0.1 MG: 0.1 TABLET ORAL at 09:42

## 2017-07-10 RX ADMIN — FERROUS SULFATE TAB 325 MG (65 MG ELEMENTAL FE) 325 MG: 325 (65 FE) TAB at 07:42

## 2017-07-10 RX ADMIN — GUAIFENESIN 600 MG: 600 TABLET, EXTENDED RELEASE ORAL at 09:42

## 2017-07-10 RX ADMIN — WARFARIN SODIUM 10 MG: 5 TABLET ORAL at 17:56

## 2017-07-10 RX ADMIN — FOLIC ACID 1 MG: 1 TABLET ORAL at 09:42

## 2017-07-10 RX ADMIN — PANTOPRAZOLE SODIUM 40 MG: 40 TABLET, DELAYED RELEASE ORAL at 06:00

## 2017-07-10 RX ADMIN — CHLORDIAZEPOXIDE HYDROCHLORIDE 5 MG: 5 CAPSULE ORAL at 03:14

## 2017-07-10 RX ADMIN — MULTIVITAMIN 15 ML: LIQUID ORAL at 09:43

## 2017-07-10 RX ADMIN — IPRATROPIUM BROMIDE 0.5 MG: 0.5 SOLUTION RESPIRATORY (INHALATION) at 07:34

## 2017-07-10 RX ADMIN — HEPARIN SODIUM AND DEXTROSE 20 UNITS/KG/HR: 10000; 5 INJECTION INTRAVENOUS at 09:53

## 2017-07-10 RX ADMIN — LEVALBUTEROL HYDROCHLORIDE 1.25 MG: 1.25 SOLUTION, CONCENTRATE RESPIRATORY (INHALATION) at 07:34

## 2017-07-10 RX ADMIN — Medication 100 MG: at 09:42

## 2017-07-11 ENCOUNTER — GENERIC CONVERSION - ENCOUNTER (OUTPATIENT)
Dept: OTHER | Facility: OTHER | Age: 52
End: 2017-07-11

## 2017-07-11 ENCOUNTER — APPOINTMENT (OUTPATIENT)
Dept: LAB | Facility: CLINIC | Age: 52
End: 2017-07-11
Payer: COMMERCIAL

## 2017-07-11 DIAGNOSIS — I26.99 PE (PULMONARY THROMBOEMBOLISM) (HCC): ICD-10-CM

## 2017-07-11 LAB
INR PPP: 1.18 (ref 0.86–1.16)
PROTHROMBIN TIME: 15.1 SECONDS (ref 12.1–14.4)

## 2017-07-11 PROCEDURE — 36415 COLL VENOUS BLD VENIPUNCTURE: CPT

## 2017-07-11 PROCEDURE — 85610 PROTHROMBIN TIME: CPT

## 2017-07-13 ENCOUNTER — TELEPHONE (OUTPATIENT)
Dept: OTHER | Facility: HOSPITAL | Age: 52
End: 2017-07-13

## 2017-07-13 ENCOUNTER — GENERIC CONVERSION - ENCOUNTER (OUTPATIENT)
Dept: OTHER | Facility: OTHER | Age: 52
End: 2017-07-13

## 2017-07-14 ENCOUNTER — GENERIC CONVERSION - ENCOUNTER (OUTPATIENT)
Dept: OTHER | Facility: OTHER | Age: 52
End: 2017-07-14

## 2017-07-14 ENCOUNTER — TELEPHONE (OUTPATIENT)
Dept: OTHER | Facility: HOSPITAL | Age: 52
End: 2017-07-14

## 2017-07-17 ENCOUNTER — GENERIC CONVERSION - ENCOUNTER (OUTPATIENT)
Dept: OTHER | Facility: OTHER | Age: 52
End: 2017-07-17

## 2017-07-27 ENCOUNTER — GENERIC CONVERSION - ENCOUNTER (OUTPATIENT)
Dept: OTHER | Facility: OTHER | Age: 52
End: 2017-07-27

## 2017-08-07 ENCOUNTER — GENERIC CONVERSION - ENCOUNTER (OUTPATIENT)
Dept: OTHER | Facility: OTHER | Age: 52
End: 2017-08-07

## 2018-01-09 NOTE — PROGRESS NOTES
History of Present Illness  Care Coordination Encounter Information:   Type of Encounter: Telephonic   Contact: Follow-Up    Spoke to Patient  Care Coordination SL Nurse [facility]:   The reason for call is to discuss outreach for follow up/needed services and coordination of meeting care plan treatment goals  Attempted to contact patient for f/u in regards to health and also to assist patient with needed services and giving him information to sign up for insurance so he is able to be set up with a f/u with a PCP in regards to his health  Voicemail left; waiting return call  Active Problems    1  Anemia (285 9) (D64 9)   2   Black stool (792 1) (K92 1)    Signatures   Electronically signed by : Gogo Joseph RN; Jul 17 2017  3:53PM EST                       (Author)

## 2018-01-12 NOTE — PROGRESS NOTES
History of Present Illness  Care Coordination Encounter Information:   Type of Encounter: Telephonic   Contact: Follow-Up    Spoke to Other  Care Coordination SL Nurse [facility]:   The reason for call is to discuss outreach for follow up/needed services and coordination of meeting care plan treatment goals  Multiple attempts made to contact patient with no return calls  Will close from care coordination at this time  Active Problems    1  Anemia (285 9) (D64 9)   2   Black stool (792 1) (K92 1)    Signatures   Electronically signed by : Gemma Correa RN; Aug  7 2017 11:44AM EST                       (Author)

## 2018-01-13 NOTE — PROGRESS NOTES
History of Present Illness  Care Coordination Encounter Information:   Type of Encounter: Telephonic   Contact: Initial Contact    Spoke to Patient  Care Coordination SL Nurse Diego Chawla:   The reason for call is to discuss outreach for follow up/needed services and coordination of meeting care plan treatment goals  Attempted to contact patient to f/u  Voicemail left; waiting return call  Active Problems    1  Anemia (285 9) (D64 9)   2   Black stool (792 1) (K92 1)    Signatures   Electronically signed by : Taylor Nino RN; Jul 13 2017  1:11PM EST                       (Author)

## 2018-01-13 NOTE — PROGRESS NOTES
History of Present Illness  Care Coordination Encounter Information:    Spoke to Other  Care Coordination SL Nurse Sagar Rowell:   The reason for call is to discuss results  Patient also referred to  of care coordination to assist patient in obtaining health insurance that way patient could be set up with primary care physician  Patient aware  Active Problems    1  Anemia (285 9) (D64 9)   2   Black stool (792 1) (K92 1)    Signatures   Electronically signed by : Traci Sidhu RN; Jul 11 2017 10:56AM EST                       (Author)

## 2018-01-15 NOTE — PROGRESS NOTES
History of Present Illness  Care Coordination Encounter Information:   Type of Encounter: Telephonic    Spoke to Patient  Care Coordination SL Nurse [facility]:   The reason for call is to discuss outreach for follow up/needed services and coordination of meeting care plan treatment goals  Left message on patient's voicemail; waiting return call  Did also task  to include her in this case to assist patient in the right direction  Active Problems    1  Anemia (285 9) (D64 9)   2   Black stool (792 1) (K92 1)    Signatures   Electronically signed by : Elyse Gan RN; Jul 27 2017  3:46PM EST                       (Author)

## 2018-01-18 NOTE — PROGRESS NOTES
History of Present Illness  Care Coordination Encounter Information:   Type of Encounter: Telephonic   Contact: Initial Contact    Spoke to Patient  Care Coordination [facility] Nurse Preston Dopp:   The reason for call is to discuss outreach for follow up/needed services and coordination of meeting care plan treatment goals  Patient stated that he is doing well today  Denied pain or discomfort  Stated that he has been taking medication as ordered and tolerating them  Educated on good medication management  Asked patient if he got PT/INR completed today per physician orders  Patient stated he did not go because [place] is far for him to go to lab  I gave patient the address to [address] [place] [address] to get labs drawn since it is closer  Patient stated that he will go today and that location is better for him  Educated patient on the importance of following physician orders of getting PT/INR drawn and the risks of not obtaining it  Patient verbalized understanding and stated that he will definitely go  Will continue to f/u with patient  Active Problems    1  Anemia (285 9) (D64 9)   2   Black stool (792 1) (K92 1)    Signatures   Electronically signed by : Elyse Gan RN; Jul 11 2017 10:32AM EST                       (Author)

## 2018-12-30 ENCOUNTER — APPOINTMENT (EMERGENCY)
Dept: CT IMAGING | Facility: HOSPITAL | Age: 53
DRG: 371 | End: 2018-12-30

## 2018-12-30 ENCOUNTER — HOSPITAL ENCOUNTER (INPATIENT)
Facility: HOSPITAL | Age: 53
LOS: 2 days | Discharge: LEFT BEFORE DISCHARGE | DRG: 371 | End: 2019-01-01
Attending: EMERGENCY MEDICINE | Admitting: GENERAL PRACTICE

## 2018-12-30 DIAGNOSIS — K65.2 SPONTANEOUS BACTERIAL PERITONITIS (HCC): Primary | ICD-10-CM

## 2018-12-30 DIAGNOSIS — R18.8 ASCITES: ICD-10-CM

## 2018-12-30 DIAGNOSIS — R14.0 ABDOMINAL DISTENSION: ICD-10-CM

## 2018-12-30 DIAGNOSIS — R11.2 NAUSEA & VOMITING: ICD-10-CM

## 2018-12-30 DIAGNOSIS — K74.60 CIRRHOSIS (HCC): ICD-10-CM

## 2018-12-30 DIAGNOSIS — F10.10 ALCOHOL ABUSE: ICD-10-CM

## 2018-12-30 DIAGNOSIS — A04.72 CLOSTRIDIUM DIFFICILE COLITIS: ICD-10-CM

## 2018-12-30 PROBLEM — R10.9 ABDOMINAL PAIN: Status: ACTIVE | Noted: 2018-12-30

## 2018-12-30 PROBLEM — K92.1 MELENA: Status: ACTIVE | Noted: 2018-12-30

## 2018-12-30 PROBLEM — K70.30 ALCOHOLIC CIRRHOSIS (HCC): Status: ACTIVE | Noted: 2018-12-30

## 2018-12-30 LAB
ALBUMIN SERPL BCP-MCNC: 3 G/DL (ref 3.5–5)
ALP SERPL-CCNC: 297 U/L (ref 46–116)
ALT SERPL W P-5'-P-CCNC: 27 U/L (ref 12–78)
AMMONIA PLAS-SCNC: 33 UMOL/L (ref 11–35)
ANION GAP SERPL CALCULATED.3IONS-SCNC: 14 MMOL/L (ref 4–13)
APTT PPP: 30 SECONDS (ref 26–38)
AST SERPL W P-5'-P-CCNC: 69 U/L (ref 5–45)
BASOPHILS # BLD AUTO: 0.04 THOUSANDS/ΜL (ref 0–0.1)
BASOPHILS NFR BLD AUTO: 1 % (ref 0–1)
BILIRUB DIRECT SERPL-MCNC: 0.61 MG/DL (ref 0–0.2)
BILIRUB SERPL-MCNC: 1.6 MG/DL (ref 0.2–1)
BUN SERPL-MCNC: 17 MG/DL (ref 5–25)
CALCIUM SERPL-MCNC: 9.2 MG/DL (ref 8.3–10.1)
CHLORIDE SERPL-SCNC: 102 MMOL/L (ref 100–108)
CO2 SERPL-SCNC: 24 MMOL/L (ref 21–32)
CREAT SERPL-MCNC: 0.85 MG/DL (ref 0.6–1.3)
EOSINOPHIL # BLD AUTO: 0.01 THOUSAND/ΜL (ref 0–0.61)
EOSINOPHIL NFR BLD AUTO: 0 % (ref 0–6)
ERYTHROCYTE [DISTWIDTH] IN BLOOD BY AUTOMATED COUNT: 14.1 % (ref 11.6–15.1)
ETHANOL SERPL-MCNC: 177 MG/DL (ref 0–3)
GFR SERPL CREATININE-BSD FRML MDRD: 99 ML/MIN/1.73SQ M
GLUCOSE SERPL-MCNC: 132 MG/DL (ref 65–140)
HCT VFR BLD AUTO: 38.8 % (ref 36.5–49.3)
HGB BLD-MCNC: 13.4 G/DL (ref 12–17)
IMM GRANULOCYTES # BLD AUTO: 0.02 THOUSAND/UL (ref 0–0.2)
IMM GRANULOCYTES NFR BLD AUTO: 0 % (ref 0–2)
INR PPP: 1.27 (ref 0.86–1.17)
LACTATE SERPL-SCNC: 2.5 MMOL/L (ref 0.5–2)
LIPASE SERPL-CCNC: 125 U/L (ref 73–393)
LYMPHOCYTES # BLD AUTO: 0.74 THOUSANDS/ΜL (ref 0.6–4.47)
LYMPHOCYTES NFR BLD AUTO: 10 % (ref 14–44)
MCH RBC QN AUTO: 31.5 PG (ref 26.8–34.3)
MCHC RBC AUTO-ENTMCNC: 34.5 G/DL (ref 31.4–37.4)
MCV RBC AUTO: 91 FL (ref 82–98)
MONOCYTES # BLD AUTO: 0.4 THOUSAND/ΜL (ref 0.17–1.22)
MONOCYTES NFR BLD AUTO: 5 % (ref 4–12)
NEUTROPHILS # BLD AUTO: 6.48 THOUSANDS/ΜL (ref 1.85–7.62)
NEUTS SEG NFR BLD AUTO: 84 % (ref 43–75)
NRBC BLD AUTO-RTO: 0 /100 WBCS
PLATELET # BLD AUTO: 141 THOUSANDS/UL (ref 149–390)
PMV BLD AUTO: 11 FL (ref 8.9–12.7)
POTASSIUM SERPL-SCNC: 3.5 MMOL/L (ref 3.5–5.3)
PROCALCITONIN SERPL-MCNC: <0.05 NG/ML
PROT SERPL-MCNC: 7.6 G/DL (ref 6.4–8.2)
PROTHROMBIN TIME: 15.8 SECONDS (ref 11.8–14.2)
RBC # BLD AUTO: 4.25 MILLION/UL (ref 3.88–5.62)
SODIUM SERPL-SCNC: 140 MMOL/L (ref 136–145)
WBC # BLD AUTO: 7.69 THOUSAND/UL (ref 4.31–10.16)

## 2018-12-30 PROCEDURE — 96375 TX/PRO/DX INJ NEW DRUG ADDON: CPT

## 2018-12-30 PROCEDURE — 84145 PROCALCITONIN (PCT): CPT | Performed by: EMERGENCY MEDICINE

## 2018-12-30 PROCEDURE — 99254 IP/OBS CNSLTJ NEW/EST MOD 60: CPT | Performed by: INTERNAL MEDICINE

## 2018-12-30 PROCEDURE — 85610 PROTHROMBIN TIME: CPT | Performed by: EMERGENCY MEDICINE

## 2018-12-30 PROCEDURE — 83690 ASSAY OF LIPASE: CPT | Performed by: EMERGENCY MEDICINE

## 2018-12-30 PROCEDURE — 80320 DRUG SCREEN QUANTALCOHOLS: CPT | Performed by: EMERGENCY MEDICINE

## 2018-12-30 PROCEDURE — 99285 EMERGENCY DEPT VISIT HI MDM: CPT

## 2018-12-30 PROCEDURE — 80076 HEPATIC FUNCTION PANEL: CPT | Performed by: EMERGENCY MEDICINE

## 2018-12-30 PROCEDURE — 83605 ASSAY OF LACTIC ACID: CPT | Performed by: EMERGENCY MEDICINE

## 2018-12-30 PROCEDURE — 80048 BASIC METABOLIC PNL TOTAL CA: CPT | Performed by: EMERGENCY MEDICINE

## 2018-12-30 PROCEDURE — 85025 COMPLETE CBC W/AUTO DIFF WBC: CPT | Performed by: EMERGENCY MEDICINE

## 2018-12-30 PROCEDURE — 82140 ASSAY OF AMMONIA: CPT | Performed by: EMERGENCY MEDICINE

## 2018-12-30 PROCEDURE — 36415 COLL VENOUS BLD VENIPUNCTURE: CPT | Performed by: EMERGENCY MEDICINE

## 2018-12-30 PROCEDURE — 99223 1ST HOSP IP/OBS HIGH 75: CPT | Performed by: INTERNAL MEDICINE

## 2018-12-30 PROCEDURE — 87493 C DIFF AMPLIFIED PROBE: CPT | Performed by: GENERAL PRACTICE

## 2018-12-30 PROCEDURE — C9113 INJ PANTOPRAZOLE SODIUM, VIA: HCPCS | Performed by: INTERNAL MEDICINE

## 2018-12-30 PROCEDURE — 85730 THROMBOPLASTIN TIME PARTIAL: CPT | Performed by: EMERGENCY MEDICINE

## 2018-12-30 PROCEDURE — 74177 CT ABD & PELVIS W/CONTRAST: CPT

## 2018-12-30 PROCEDURE — 87040 BLOOD CULTURE FOR BACTERIA: CPT | Performed by: EMERGENCY MEDICINE

## 2018-12-30 PROCEDURE — 96374 THER/PROPH/DIAG INJ IV PUSH: CPT

## 2018-12-30 PROCEDURE — C9113 INJ PANTOPRAZOLE SODIUM, VIA: HCPCS | Performed by: EMERGENCY MEDICINE

## 2018-12-30 PROCEDURE — 94762 N-INVAS EAR/PLS OXIMTRY CONT: CPT

## 2018-12-30 RX ORDER — NICOTINE 21 MG/24HR
1 PATCH, TRANSDERMAL 24 HOURS TRANSDERMAL DAILY
Status: DISCONTINUED | OUTPATIENT
Start: 2018-12-30 | End: 2019-01-01 | Stop reason: HOSPADM

## 2018-12-30 RX ORDER — PANTOPRAZOLE SODIUM 40 MG/1
40 INJECTION, POWDER, FOR SOLUTION INTRAVENOUS EVERY 12 HOURS SCHEDULED
Status: DISCONTINUED | OUTPATIENT
Start: 2018-12-30 | End: 2019-01-01 | Stop reason: HOSPADM

## 2018-12-30 RX ORDER — ONDANSETRON 2 MG/ML
4 INJECTION INTRAMUSCULAR; INTRAVENOUS EVERY 6 HOURS PRN
Status: DISCONTINUED | OUTPATIENT
Start: 2018-12-30 | End: 2019-01-01 | Stop reason: HOSPADM

## 2018-12-30 RX ORDER — HEPARIN SODIUM 5000 [USP'U]/ML
5000 INJECTION, SOLUTION INTRAVENOUS; SUBCUTANEOUS EVERY 8 HOURS SCHEDULED
Status: DISCONTINUED | OUTPATIENT
Start: 2018-12-30 | End: 2018-12-31

## 2018-12-30 RX ORDER — ONDANSETRON 2 MG/ML
4 INJECTION INTRAMUSCULAR; INTRAVENOUS ONCE
Status: COMPLETED | OUTPATIENT
Start: 2018-12-30 | End: 2018-12-30

## 2018-12-30 RX ORDER — PANTOPRAZOLE SODIUM 40 MG/1
40 INJECTION, POWDER, FOR SOLUTION INTRAVENOUS ONCE
Status: COMPLETED | OUTPATIENT
Start: 2018-12-30 | End: 2018-12-30

## 2018-12-30 RX ORDER — METOCLOPRAMIDE HYDROCHLORIDE 5 MG/ML
10 INJECTION INTRAMUSCULAR; INTRAVENOUS ONCE
Status: COMPLETED | OUTPATIENT
Start: 2018-12-30 | End: 2018-12-30

## 2018-12-30 RX ADMIN — CEFTRIAXONE SODIUM 1000 MG: 10 INJECTION, POWDER, FOR SOLUTION INTRAVENOUS at 09:23

## 2018-12-30 RX ADMIN — HEPARIN SODIUM 5000 UNITS: 5000 INJECTION, SOLUTION INTRAVENOUS; SUBCUTANEOUS at 23:37

## 2018-12-30 RX ADMIN — METOCLOPRAMIDE 10 MG: 5 INJECTION, SOLUTION INTRAMUSCULAR; INTRAVENOUS at 06:29

## 2018-12-30 RX ADMIN — IOHEXOL 100 ML: 350 INJECTION, SOLUTION INTRAVENOUS at 06:25

## 2018-12-30 RX ADMIN — NICOTINE 1 PATCH: 21 PATCH TRANSDERMAL at 14:18

## 2018-12-30 RX ADMIN — PANTOPRAZOLE SODIUM 40 MG: 40 INJECTION, POWDER, FOR SOLUTION INTRAVENOUS at 09:23

## 2018-12-30 RX ADMIN — PANTOPRAZOLE SODIUM 40 MG: 40 INJECTION, POWDER, FOR SOLUTION INTRAVENOUS at 23:44

## 2018-12-30 RX ADMIN — ONDANSETRON 4 MG: 2 INJECTION INTRAMUSCULAR; INTRAVENOUS at 02:51

## 2018-12-30 RX ADMIN — ONDANSETRON 4 MG: 2 INJECTION INTRAMUSCULAR; INTRAVENOUS at 04:54

## 2018-12-30 NOTE — H&P
History and Physical - [facility]    Patient Information: Mallory Jackson 46 y o  male MRN: [id number]  Unit/Bed#: ED 34 Encounter: [id number]  Admitting Physician: Adria Ritchie MD  PCP: No primary care provider on file  Date of Admission:  12/30/18    Assessment/Plan:    Hospital Problem List:     Principal Problem:    Abdominal pain  Active Problems:    ETOH abuse    PE (pulmonary thromboembolism) (HCC)    Tobacco abuse    Hypoalbuminemia    Hypertension    Melena    Alcoholic cirrhosis (HCC)    Ascites      Plan:    Abdominal pain  Differentials-gastritis versus esophagitis versus peptic ulcer disease versus SBP versus colitis  Lipase normal   Status post CT of the abdomen demonstrated findings suggestive of cirrhosis with portal hypertension  Also changes concerning for infectious/inflammatory colitis  Thickening of distal esophagus could be a component of esophagitis or varices seen  Continue IV Rocephin  Consult IR for diagnostic and therapeutic paracentesis  Continue Protonix IV b i d  Consult GI    Alcoholic cirrhosis complicated by ascites  Meld 11 as of today's labs  Ammonia levels normal   Continue IV Rocephin for SBP prophylaxis  GI following    Alcohol abuse  Last drink yesterday night  Alcohol levels on admission 177  On CIWA protocol  Monitor for signs and symptoms of withdrawal    History of PE  Patient currently not on anticoagulation as outpatient  Continue subcu heparin for prophylaxis  Continue to monitor    VTE Prophylaxis: Heparin  / sequential compression device   Code Status: full code  POLST: There is no POLST form on file for this patient (pre-hospital)    Anticipated Length of Stay:  Patient will be admitted on an Inpatient basis with an anticipated length of stay of  Greater 2 midnights  Justification for Hospital Stay:     Total Time for Visit, including Counseling / Coordination of Care: 30 minutes    Greater than 50% of this total time spent on direct patient counseling and coordination of care  Chief Complaint:  Abdominal pain    History of Present Illness:    Michelle Medina is a 46 y o  male with past medical history of alcohol abuse, cirrhosis, history of PE who presents with nausea, vomiting, abdominal pain  He states that he went on binge drinking over the weekend  He developed nausea, vomiting and abdominal pain, presented to ER for further evaluation  No fevers  Positive for chills  Had some diarrhea and noted black colored stools  No hematemesis or coffee-ground emesis  No blood in the stool  Also noticed abdominal distention worsening over the last few days  His last paracentesis was in mid November at [facility] when 6 8 L of fluid removed    Review of Systems:    Review of Systems   Constitutional: Positive for chills  Negative for fever  Respiratory: Negative for cough, choking, chest tightness and shortness of breath  Cardiovascular: Negative for chest pain and palpitations  Gastrointestinal: Positive for abdominal distention, abdominal pain, diarrhea, nausea and vomiting  Negative for blood in stool  Genitourinary: Negative for dysuria  Musculoskeletal: Positive for back pain  Neurological: Negative for dizziness and light-headedness  Past Medical and Surgical History:     Past Medical History:   Diagnosis Date    ETOH abuse     Liver cirrhosis ([facility] [address] )        Past Surgical History:   Procedure Laterality Date    COLONOSCOPY N/A 7/3/2017    Procedure: COLONOSCOPY;  Surgeon: Reji Funez MD;  Location: [facility]; Service: Gastroenterology    ESOPHAGOGASTRODUODENOSCOPY N/A 6/25/2017    Procedure: ESOPHAGOGASTRODUODENOSCOPY (EGD); Surgeon: Isacc Maldonado MD;  Location: [facility]; Service: Gastroenterology       Meds/Allergies:    Prior to Admission medications    Medication Sig Start Date End Date Taking?  Authorizing Provider   cloNIDine (CATAPRES) 0 1 mg tablet Take 1 tablet by mouth every 12 (twelve) hours 7/10/17 12/30/18  Lina Crow MD   ferrous sulfate 325 (65 Fe) mg tablet Take 1 tablet by mouth daily with breakfast 7/10/17 12/30/18  Lina Crow MD   pantoprazole (PROTONIX) 40 mg tablet Take 1 tablet by mouth 2 (two) times a day before meals 7/10/17 12/30/18  Lina Crow MD   warfarin (COUMADIN) 10 mg tablet Take one tab by mouth daily before bed  If you develop any bleeding of the nose or gums, or bowels stop taking the medication 7/10/17 12/30/18  Lina Crow MD     I have reviewed home medications with patient personally  Allergies: No Known Allergies    Social History:     Marital Status: Single   Occupation:   Patient Pre-hospital Living Situation:   Patient Pre-hospital Level of Mobility:   Patient Pre-hospital Diet Restrictions:   Substance Use History:   History   Alcohol Use    Yes     Comment: vodka -approx one bottle every nite  History   Smoking Status    Current Every Day Smoker    Packs/day: 1 00    Years: 25 00   Smokeless Tobacco    Never Used     History   Drug Use No       Family History:    History reviewed  No pertinent family history  Physical Exam:     Vitals:   Blood Pressure: 128/69 (12/30/18 0928)  Pulse: (!) 107 (12/30/18 0928)  Temperature: 98 1 °F (36 7 °C) (12/30/18 0138)  Temp Source: Oral (12/30/18 0138)  Respirations: 18 (12/30/18 0630)  Weight - Scale: 72 6 kg (160 lb 0 9 oz) (12/30/18 0138)  SpO2: 95 % (12/30/18 0928)    Physical Exam   Constitutional: He is oriented to person, place, and time  He appears well-developed and well-nourished  No distress  HENT:   Head: Normocephalic and atraumatic  Eyes: Pupils are equal, round, and reactive to light  EOM are normal    Neck: Normal range of motion  Neck supple  Cardiovascular: Normal rate and regular rhythm  Pulmonary/Chest: Effort normal and breath sounds normal    Abdominal: Soft  Bowel sounds are normal    Musculoskeletal: Normal range of motion     Neurological: He is alert and oriented to person, place, and time  Skin: Skin is warm and dry  Additional Data:     Lab Results: I have personally reviewed pertinent reports  Results from last 7 days  Lab Units 12/30/18  0211   WBC Thousand/uL 7 69   HEMOGLOBIN g/dL 13 4   HEMATOCRIT % 38 8   PLATELETS Thousands/uL 141*   NEUTROS PCT % 84*   LYMPHS PCT % 10*   MONOS PCT % 5   EOS PCT % 0       Results from last 7 days  Lab Units 12/30/18  0211   POTASSIUM mmol/L 3 5   CHLORIDE mmol/L 102   CO2 mmol/L 24   BUN mg/dL 17   CREATININE mg/dL 0 85   CALCIUM mg/dL 9 2   ALK PHOS U/L 297*   ALT U/L 27   AST U/L 69*       Results from last 7 days  Lab Units 12/30/18  0630   INR  1 27*       Imaging: I have personally reviewed pertinent reports  Ct Abdomen Pelvis With Contrast    Result Date: 12/30/2018  Narrative: CT ABDOMEN AND PELVIS WITH IV CONTRAST INDICATION:   Abdominal pain, distension, and vomiting  History of cirrhosis  COMPARISON:  CT chest, abdomen and pelvis 6/24/2017 TECHNIQUE:  CT examination of the abdomen and pelvis was performed  Delayed images of the abdomen were also obtained  Axial, sagittal, and coronal 2D reformatted images were created from the source data and submitted for interpretation  Radiation dose length product (DLP) for this visit:  519 mGy-cm   This examination, like all CT scans performed in the [facility], was performed utilizing techniques to minimize radiation dose exposure, including the use of iterative reconstruction and automated exposure control  IV Contrast:  100 mL of iohexol (OMNIPAQUE) Enteric Contrast:  Enteric contrast was not administered  FINDINGS: ABDOMEN LOWER CHEST:  There is thickening of the distal esophagus which may in part reflect hiatal hernia and/or element of esophagitis  LIVER/BILIARY TREE:   Cirrhotic liver morphology    There is decreased attenuation most notably within the right lobe and dome of the left lobe medial segment, suggestive of diffuse fatty infiltrative changes  Extensive fatty infiltration was present on the previous CT study  There is no discrete focal mass  Evaluation of hepatocellular carcinoma limited without arterial phase imaging (not performed for this study)  There is slightly heterogeneous attenuation of the left lobe also noted  The right hepatic vein is not as well visualized as the middle and left hepatic veins although is probably patent  The portal veins are patent  There is no intrahepatic duct dilatation  Recanalized umbilical vein in keeping with portal hypertension  GALLBLADDER:  No calcified gallstones  Pericholecystic edema, nonspecific in the setting of ascites  If there is clinical concern for cholecystitis, nuclear medicine HIDA scan may be obtained  SPLEEN:  Unremarkable  PANCREAS:  Unremarkable  ADRENAL GLANDS:  Unremarkable  KIDNEYS/URETERS:  One or more sharply circumscribed subcentimeter renal hypodensities are noted  These lesions are too small to accurately characterize, but are statistically most likely to represent benign cortical renal cyst(s)  According to the guidelines published in the [employer] Paper of the ACR Incidental Findings Committee (Radiology 2010), no further workup of these lesions is recommended  No hydronephrosis  STOMACH AND BOWEL:  The stomach is poorly distended, evaluation limited  Small bowel is normal caliber  There is diffuse wall thickening seen throughout small bowel loops as well as the colon which could reflect infectious or inflammatory enterocolitis versus portal hypertensive enterocolopathy  APPENDIX:  No findings to suggest appendicitis  ABDOMINOPELVIC CAVITY:  No free intraperitoneal air  No lymphadenopathy  Small to modest amount of abdominal and pelvic ascites  There is some irregular enhancement seen along the peritoneum along the posterior right peritoneal reflection  The possibility of spontaneous bacterial peritonitis (SBP) cannot be excluded    This is new from the previous study as is the ascites  VESSELS:  Ectatic atherosclerotic abdominal aorta measuring up to 2 7 cm  PELVIS REPRODUCTIVE ORGANS:  Unremarkable for patient's age  URINARY BLADDER:  Mild circumferential bladder wall thickening without perivesical inflammatory changes, favored to represent underdistention as opposed to cystitis  ABDOMINAL WALL/INGUINAL REGIONS:  Unremarkable  OSSEOUS STRUCTURES:  No acute fracture or destructive osseous lesion  Degenerative disc disease L1-2 and L5-S1  Impression: Cirrhosis and sequela of portal hypertension evidenced by recanalized umbilical vein and abdominal ascites  There is some irregular enhancement of the right peritoneal reflection for which SBP cannot be excluded  Clinical correlation is necessary  Heterogeneous attenuation of the liver, favored to represent patchy fatty infiltration given the extensive fatty liver change on the previous study  Correlate with alpha fetal protein levels and baseline evaluation with MRI abdomen with IV contrast may be considered on a nonemergent basis  Diffuse wall thickening of small and large bowel which could reflect infectious or inflammatory enterocolitis versus portal hypertensive enterocolopathy  No evidencarices seen  Mild circumferential bladder wall thickening without perivesical inflammatory changes, favored to represent underdistention as opposed to cystitis  If there is superpubic tenderness, urinalysis may be helpful  I personally discussed this study with SUSANNA Brannon of bowel obstruction  Thickening of the distal esophagus which may in part reflect hiatal hernia and/or element of esophagitis  If relevant, this could be correlated with direct visualization    No discrete esophageal vRIERO on 12/30/2018 at 8:29 AM  Workstation performed: [id number]       EKG, Pathology, and Other Studies Reviewed on Admission:   · EKG:     Allscripts / Epic Records Reviewed: Yes     ** Please Note: This note has been constructed using a voice recognition system   **

## 2018-12-30 NOTE — ED NOTES
Wife very angry at this time about wait  Explained that the ER is full at this time and that there is 1 Dr on  Wife Christie Coffey and states that she has chronic back pain and is very uncomfortable sitting in the chair        Blake uHrtado RN  12/30/18 9098

## 2018-12-30 NOTE — PLAN OF CARE
CARDIOVASCULAR - ADULT     Absence of cardiac dysrhythmias or at baseline rhythm Progressing        DISCHARGE PLANNING     Discharge to home or other facility with appropriate resources Progressing        HEMATOLOGIC - ADULT     Maintains hematologic stability Progressing        INFECTION - ADULT     Absence or prevention of progression during hospitalization Progressing     Absence of fever/infection during neutropenic period Progressing        Knowledge Deficit     Patient/family/caregiver demonstrates understanding of disease process, treatment plan, medications, and discharge instructions Progressing        NEUROSENSORY - ADULT     Absence of seizures Progressing     Remains free of injury related to seizures activity Progressing        PAIN - ADULT     Verbalizes/displays adequate comfort level or baseline comfort level Progressing        RESPIRATORY - ADULT     Achieves optimal ventilation and oxygenation Progressing        SAFETY ADULT     Patient will remain free of falls Progressing     Maintain or return to baseline ADL function Progressing     Maintain or return mobility status to optimal level Progressing

## 2018-12-30 NOTE — ED NOTES
Wife and pt requesting IV to be taken out and stating that they are going home  Upon taking pt IV out pt sat up in bed and started vomiting  Pt and wife requesting IV to be placed again at this time        Abhinav Lugo RN  12/30/18 4832

## 2018-12-30 NOTE — ED PROVIDER NOTES
History  Chief Complaint   Patient presents with    Abdominal Pain     Pt states that he recently went on a drinking binge this week after being sober for a couple months  Pt c/o abdominal distention, pain, N/V       HPI    None       Past Medical History:   Diagnosis Date    ETOH abuse     Liver cirrhosis ([address] )        Past Surgical History:   Procedure Laterality Date    COLONOSCOPY N/A 7/3/2017    Procedure: COLONOSCOPY;  Surgeon: Irlanda Snow MD;  Location: [facility]; Service: Gastroenterology    ESOPHAGOGASTRODUODENOSCOPY N/A 6/25/2017    Procedure: ESOPHAGOGASTRODUODENOSCOPY (EGD); Surgeon: Barnie Gosselin, MD;  Location: [facility]; Service: Gastroenterology       History reviewed  No pertinent family history  I have reviewed and agree with the history as documented  Social History   Substance Use Topics    Smoking status: Current Every Day Smoker     Packs/day: 1 00     Years: 25 00    Smokeless tobacco: Never Used    Alcohol use Yes      Comment: vodka -approx one bottle every nite  Review of Systems    Physical Exam  Physical Exam   Constitutional: He is oriented to person, place, and time  He appears well-developed and well-nourished  No distress  Older than stated age   HENT:   Head: Normocephalic and atraumatic  Mouth/Throat: Oropharynx is clear and moist    Eyes: Pupils are equal, round, and reactive to light  Conjunctivae are normal    Neck: Normal range of motion  No tracheal deviation present  Cardiovascular: Normal rate, regular rhythm, normal heart sounds and intact distal pulses  Pulmonary/Chest: Effort normal and breath sounds normal  No respiratory distress  Abdominal: Soft  Bowel sounds are normal  He exhibits distension  He exhibits no fluid wave  There is generalized tenderness (worst RUQ)  There is no rigidity, no rebound and no guarding  Musculoskeletal: He exhibits no edema  Neurological: He is alert and oriented to person, place, and time   GCS eye subscore is 4  GCS verbal subscore is 5  GCS motor subscore is 6  Skin: Skin is warm and dry  Psychiatric: He has a normal mood and affect  His behavior is normal    Nursing note and vitals reviewed        Vital Signs  ED Triage Vitals [12/30/18 0138]   Temperature Pulse Respirations Blood Pressure SpO2   98 1 °F (36 7 °C) (!) 106 20 144/82 97 %      Temp Source Heart Rate Source Patient Position - Orthostatic VS BP Location FiO2 (%)   Oral Monitor Lying Right arm --      Pain Score       1           Vitals:    12/30/18 0330 12/30/18 0440 12/30/18 0545 12/30/18 0630   BP: 132/82 140/81 138/79 134/78   Pulse: 105 (!) 109 (!) 109 (!) 109   Patient Position - Orthostatic VS: Sitting Sitting Sitting Sitting       Visual Acuity      ED Medications  Medications   cefTRIAXone (ROCEPHIN) 1,000 mg in dextrose 5 % 50 mL IVPB (not administered)   pantoprazole (PROTONIX) injection 40 mg (not administered)   ondansetron (ZOFRAN) injection 4 mg (4 mg Intravenous Given 12/30/18 0251)   ondansetron (ZOFRAN) injection 4 mg (4 mg Intravenous Given 12/30/18 0454)   metoclopramide (REGLAN) injection 10 mg (10 mg Intravenous Given 12/30/18 0629)   iohexol (OMNIPAQUE) 350 MG/ML injection (MULTI-DOSE) 100 mL (100 mL Intravenous Given 12/30/18 0625)       Diagnostic Studies  Results Reviewed     Procedure Component Value Units Date/Time    Blood culture #1 [[id number]]     Lab Status:  No result Specimen:  Blood     Blood culture #2 [[id number]]     Lab Status:  No result Specimen:  Blood     Lactic acid, plasma [[id number]]     Lab Status:  No result Specimen:  Blood     Procalcitonin [[id number]]     Lab Status:  No result Specimen:  Blood     Protime-INR [[id number]]  (Abnormal) Collected:  12/30/18 0630    Lab Status:  Final result Specimen:  Blood from Arm, Left Updated:  12/30/18 0700     Protime 15 8 (H) seconds      INR 1 27 (H)    APTT [[id number]]  (Normal) Collected:  12/30/18 0630    Lab Status:  Final result Specimen:  Blood from Arm, Left Updated:  12/30/18 0700     PTT 30 seconds     Ammonia [[id number]]  (Normal) Collected:  12/30/18 0630    Lab Status:  Final result Specimen:  Blood from Arm, Left Updated:  12/30/18 1688     Ammonia 33 umol/L     Basic metabolic panel [[id number]]  (Abnormal) Collected:  12/30/18 0211    Lab Status:  Final result Specimen:  Blood from Arm, Left Updated:  12/30/18 0231     Sodium 140 mmol/L      Potassium 3 5 mmol/L      Chloride 102 mmol/L      CO2 24 mmol/L      ANION GAP 14 (H) mmol/L      BUN 17 mg/dL      Creatinine 0 85 mg/dL      Glucose 132 mg/dL      Calcium 9 2 mg/dL      eGFR 99 ml/min/1 73sq m     Narrative:         National Kidney Disease Education Program recommendations are as follows:  GFR calculation is accurate only with a steady state creatinine  Chronic Kidney disease less than 60 ml/min/1 73 sq  meters  Kidney failure less than 15 ml/min/1 73 sq  meters      Hepatic function panel [[id number]]  (Abnormal) Collected:  12/30/18 0211    Lab Status:  Final result Specimen:  Blood from Arm, Left Updated:  12/30/18 0231     Total Bilirubin 1 60 (H) mg/dL      Bilirubin, Direct 0 61 (H) mg/dL      Alkaline Phosphatase 297 (H) U/L      AST 69 (H) U/L      ALT 27 U/L      Total Protein 7 6 g/dL      Albumin 3 0 (L) g/dL     Lipase [[id number]]  (Normal) Collected:  12/30/18 0211    Lab Status:  Final result Specimen:  Blood from Arm, Left Updated:  12/30/18 0231     Lipase 125 u/L     Ethanol [[id number]]  (Abnormal) Collected:  12/30/18 0211    Lab Status:  Final result Specimen:  Blood from Arm, Left Updated:  12/30/18 0227     Ethanol Lvl 177 (H) mg/dL     CBC and differential [[id number]]  (Abnormal) Collected:  12/30/18 0211    Lab Status:  Final result Specimen:  Blood from Arm, Left Updated:  12/30/18 0217     WBC 7 69 Thousand/uL      RBC 4 25 Million/uL      Hemoglobin 13 4 g/dL      Hematocrit 38 8 %      MCV 91 fL      MCH 31 5 pg      MCHC 34 5 g/dL      RDW 14 1 %      MPV 11 0 fL Platelets 716 (L) Thousands/uL      nRBC 0 /100 WBCs      Neutrophils Relative 84 (H) %      Immat GRANS % 0 %      Lymphocytes Relative 10 (L) %      Monocytes Relative 5 %      Eosinophils Relative 0 %      Basophils Relative 1 %      Neutrophils Absolute 6 48 Thousands/µL      Immature Grans Absolute 0 02 Thousand/uL      Lymphocytes Absolute 0 74 Thousands/µL      Monocytes Absolute 0 40 Thousand/µL      Eosinophils Absolute 0 01 Thousand/µL      Basophils Absolute 0 04 Thousands/µL                  CT abdomen pelvis with contrast   Final Result by Gwen Rodriguez DO (12/30 1816)      Cirrhosis and sequela of portal hypertension evidenced by recanalized umbilical vein and abdominal ascites  There is some irregular enhancement of the right peritoneal reflection for which SBP cannot be excluded  Clinical correlation is necessary  Heterogeneous attenuation of the liver, favored to represent patchy fatty infiltration given the extensive fatty liver change on the previous study  Correlate with alpha fetal protein levels and baseline evaluation with MRI abdomen with IV contrast may    be considered on a nonemergent basis  Diffuse wall thickening of small and large bowel which could reflect infectious or inflammatory enterocolitis versus portal hypertensive enterocolopathy  No evidence of bowel obstruction  Thickening of the distal esophagus which may in part reflect hiatal hernia and/or element of esophagitis  If relevant, this could be correlated with direct visualization  No discrete esophageal varices seen  Mild circumferential bladder wall thickening without perivesical inflammatory changes, favored to represent underdistention as opposed to cystitis  If there is superpubic tenderness, urinalysis may be helpful         I personally discussed this study with Merlin Monaco on 12/30/2018 at 8:29 AM                Workstation performed: [id number] Procedures  CriticalCare Time  Performed by: Dedra Zurita  Authorized by: Dedra Zurita     Critical care provider statement:     Critical care time (minutes):  31    Critical care time was exclusive of:  Teaching time and separately billable procedures and treating other patients    Critical care was necessary to treat or prevent imminent or life-threatening deterioration of the following conditions:  Sepsis    Critical care was time spent personally by me on the following activities:  Obtaining history from patient or surrogate, development of treatment plan with patient or surrogate, discussions with consultants, evaluation of patient's response to treatment, examination of patient, review of old charts, re-evaluation of patient's condition, ordering and review of laboratory studies, ordering and review of radiographic studies and ordering and performing treatments and interventions    I assumed direction of critical care for this patient from another provider in my specialty: no             Phone Contacts  ED Phone Contact    ED Course                               MDM  Number of Diagnoses or Management Options  Abdominal distension: new and requires workup  Alcohol abuse: new and requires workup  Ascites: new and requires workup  Cirrhosis [facility]): new and requires workup  Nausea & vomiting: new and requires workup  Spontaneous bacterial peritonitis [facility]): new and requires workup  Diagnosis management comments: This is a 70-year-old male who presents here today with abdominal pain, nausea, and vomiting  He is an alcoholic who had been sober for about two months  He states he has been on a drinking binge for about a week and half  He was drinking a gallon of vodka over the course of a couple of days  He states he has been vomiting several times a day for the past few days, and that it has been intermittently black    The significant other states she was only aware vomiting starting yesterday, and being black later in the evening  He has vomited about 6-7 times yesterday and three so far this morning  He feels like his abdomen is distended  He denies any diarrhea  He has a history of cirrhosis from the alcohol abuse, and is followed by Dr Margarita Hernandez  His last appointment was in November  He has previously required paracentesis, up to 7 liters of fluid removed  He had two of these performed in October, and medications were changed around with significant improvement of his abdominal swelling  The patient states while he has been on the drinking binge he has not been taking his diuretics as frequently as he should  Because of his worsening vomiting, his significant other made him come to the emergency department for evaluation  He denies any fevers or URI symptoms  He had a single loose stool but no other diarrhea  He has no urinary symptoms  He has no known sick contacts or bad food exposures  He has no known history of esophageal varices  ROS: Otherwise negative, unless stated as above  He is well-appearing, in no acute distress  He has diffuse abdominal tenderness, worst in the right upper quadrant, without peritoneal signs  There is mild distention but no fluid wave  The remainder of his exam is unremarkable  Differential includes acute hepatitis or pancreatitis secondary to alcohol abuse, bleeding esophageal varices, viral GI illness superimposed on chronic liver disease  We will check lab work and CT scan to evaluate  His nausea has improved at this time  He has a mild thrombocytopenia, which is new for him  His LFTs are similar to prior  His CT scan shows findings that could suggest SBP  Bedside ultrasound was performed which does show ascites, but only a single deep pocket that had a floating piece of bowel in it, making it not appropriate for attempt at paracentesis    Given concern for SBP, we will start him on Rocephin and admit him for further management  Amount and/or Complexity of Data Reviewed  Clinical lab tests: reviewed and ordered  Tests in the radiology section of CPT®: reviewed and ordered  Independent visualization of images, tracings, or specimens: yes      CritCare Time    Disposition  Final diagnoses:   Spontaneous bacterial peritonitis ([facility] )   Alcohol abuse   Cirrhosis ([facility] )   Abdominal distension   Ascites   Nausea & vomiting     Time reflects when diagnosis was documented in both MDM as applicable and the Disposition within this note     Time User Action Codes Description Comment    12/30/2018  8:43 AM Mitchell-Shyann Reynolds Add [K65 2] Spontaneous bacterial peritonitis ([facility] )     12/30/2018  8:47 AM Mitchell-Shyann Reynolds Add [F10 10] Alcohol abuse     12/30/2018  8:47 AM Shyann Galvan Add [K74 60] Cirrhosis ([facility] )     12/30/2018  8:49 AM Shyann Galvan Add [R14 0] Abdominal distension     12/30/2018  8:49 AM Shyann Galvan Add [R18 8] Ascites     12/30/2018  8:49 AM Shyann Galvan Add [R11 2] Nausea & vomiting       ED Disposition     ED Disposition Condition Comment    Admit  Case was discussed with Dr Mohsen Alvarez and the patient's admission status was agreed to be Admission Status: inpatient status to the service of Dr Mohsen Alvarez   Follow-up Information    None         Patient's Medications   Discharge Prescriptions    No medications on file     No discharge procedures on file      ED Provider  Electronically Signed by           Geovani Coburn MD  12/30/18 5699       Geovani Coburn MD  12/30/18 0819

## 2018-12-31 ENCOUNTER — APPOINTMENT (INPATIENT)
Dept: INTERVENTIONAL RADIOLOGY/VASCULAR | Facility: HOSPITAL | Age: 53
DRG: 371 | End: 2018-12-31
Attending: INTERNAL MEDICINE

## 2018-12-31 PROBLEM — E87.6 HYPOKALEMIA: Status: ACTIVE | Noted: 2018-12-31

## 2018-12-31 LAB
AFP-TM SERPL-MCNC: 8.3 NG/ML (ref 0.5–8)
ALBUMIN FLD-MCNC: 0.8 G/DL
ALBUMIN SERPL BCP-MCNC: 2.4 G/DL (ref 3.5–5)
ALP SERPL-CCNC: 221 U/L (ref 46–116)
ALT SERPL W P-5'-P-CCNC: 18 U/L (ref 12–78)
ANION GAP SERPL CALCULATED.3IONS-SCNC: 11 MMOL/L (ref 4–13)
APPEARANCE FLD: ABNORMAL
AST SERPL W P-5'-P-CCNC: 48 U/L (ref 5–45)
BILIRUB SERPL-MCNC: 1.7 MG/DL (ref 0.2–1)
BUN SERPL-MCNC: 13 MG/DL (ref 5–25)
C DIFF TOX GENS STL QL NAA+PROBE: ABNORMAL
CALCIUM SERPL-MCNC: 8.8 MG/DL (ref 8.3–10.1)
CHLORIDE SERPL-SCNC: 101 MMOL/L (ref 100–108)
CO2 SERPL-SCNC: 25 MMOL/L (ref 21–32)
COLOR FLD: YELLOW
CREAT SERPL-MCNC: 0.76 MG/DL (ref 0.6–1.3)
ERYTHROCYTE [DISTWIDTH] IN BLOOD BY AUTOMATED COUNT: 14.2 % (ref 11.6–15.1)
GFR SERPL CREATININE-BSD FRML MDRD: 103 ML/MIN/1.73SQ M
GLUCOSE SERPL-MCNC: 94 MG/DL (ref 65–140)
HCT VFR BLD AUTO: 31.6 % (ref 36.5–49.3)
HGB BLD-MCNC: 10.9 G/DL (ref 12–17)
INR PPP: 1.4 (ref 0.86–1.17)
LACTATE SERPL-SCNC: 1.3 MMOL/L (ref 0.5–2)
LDH FLD L TO P-CCNC: 67 U/L
LYMPHOCYTES NFR BLD AUTO: 21 %
MCH RBC QN AUTO: 31.7 PG (ref 26.8–34.3)
MCHC RBC AUTO-ENTMCNC: 34.5 G/DL (ref 31.4–37.4)
MCV RBC AUTO: 92 FL (ref 82–98)
MONOCYTES NFR BLD AUTO: 2 %
MONONUC CELLS NFR FLD MANUAL: 26 %
NEUTS SEG NFR BLD AUTO: 51 %
PLATELET # BLD AUTO: 76 THOUSANDS/UL (ref 149–390)
PMV BLD AUTO: 11.7 FL (ref 8.9–12.7)
POTASSIUM SERPL-SCNC: 2.8 MMOL/L (ref 3.5–5.3)
PROT FLD-MCNC: <2 G/DL
PROT SERPL-MCNC: 6.3 G/DL (ref 6.4–8.2)
PROTHROMBIN TIME: 17 SECONDS (ref 11.8–14.2)
RBC # BLD AUTO: 3.44 MILLION/UL (ref 3.88–5.62)
SITE: ABNORMAL
SODIUM SERPL-SCNC: 137 MMOL/L (ref 136–145)
TOTAL CELLS COUNTED SPEC: 100
WBC # BLD AUTO: 6.8 THOUSAND/UL (ref 4.31–10.16)
WBC # FLD MANUAL: 605 /UL

## 2018-12-31 PROCEDURE — C9113 INJ PANTOPRAZOLE SODIUM, VIA: HCPCS | Performed by: INTERNAL MEDICINE

## 2018-12-31 PROCEDURE — 87205 SMEAR GRAM STAIN: CPT | Performed by: INTERNAL MEDICINE

## 2018-12-31 PROCEDURE — 82105 ALPHA-FETOPROTEIN SERUM: CPT | Performed by: INTERNAL MEDICINE

## 2018-12-31 PROCEDURE — 49083 ABD PARACENTESIS W/IMAGING: CPT | Performed by: RADIOLOGY

## 2018-12-31 PROCEDURE — 85027 COMPLETE CBC AUTOMATED: CPT | Performed by: INTERNAL MEDICINE

## 2018-12-31 PROCEDURE — 85610 PROTHROMBIN TIME: CPT | Performed by: GENERAL PRACTICE

## 2018-12-31 PROCEDURE — 99232 SBSQ HOSP IP/OBS MODERATE 35: CPT | Performed by: INTERNAL MEDICINE

## 2018-12-31 PROCEDURE — 99232 SBSQ HOSP IP/OBS MODERATE 35: CPT | Performed by: FAMILY MEDICINE

## 2018-12-31 PROCEDURE — 80053 COMPREHEN METABOLIC PANEL: CPT | Performed by: INTERNAL MEDICINE

## 2018-12-31 PROCEDURE — 89051 BODY FLUID CELL COUNT: CPT | Performed by: INTERNAL MEDICINE

## 2018-12-31 PROCEDURE — 83605 ASSAY OF LACTIC ACID: CPT | Performed by: GENERAL PRACTICE

## 2018-12-31 PROCEDURE — 49083 ABD PARACENTESIS W/IMAGING: CPT

## 2018-12-31 PROCEDURE — 87070 CULTURE OTHR SPECIMN AEROBIC: CPT | Performed by: INTERNAL MEDICINE

## 2018-12-31 PROCEDURE — 84157 ASSAY OF PROTEIN OTHER: CPT | Performed by: INTERNAL MEDICINE

## 2018-12-31 PROCEDURE — 83615 LACTATE (LD) (LDH) ENZYME: CPT | Performed by: INTERNAL MEDICINE

## 2018-12-31 PROCEDURE — 94762 N-INVAS EAR/PLS OXIMTRY CONT: CPT

## 2018-12-31 PROCEDURE — 0W9G3ZX DRAINAGE OF PERITONEAL CAVITY, PERCUTANEOUS APPROACH, DIAGNOSTIC: ICD-10-PCS | Performed by: FAMILY MEDICINE

## 2018-12-31 PROCEDURE — 82042 OTHER SOURCE ALBUMIN QUAN EA: CPT | Performed by: INTERNAL MEDICINE

## 2018-12-31 RX ORDER — ALBUMIN (HUMAN) 12.5 G/50ML
25 SOLUTION INTRAVENOUS 3 TIMES DAILY
Status: DISCONTINUED | OUTPATIENT
Start: 2019-01-02 | End: 2019-01-01 | Stop reason: HOSPADM

## 2018-12-31 RX ORDER — ALBUMIN (HUMAN) 12.5 G/50ML
50 SOLUTION INTRAVENOUS 2 TIMES DAILY
Status: COMPLETED | OUTPATIENT
Start: 2018-12-31 | End: 2019-01-01

## 2018-12-31 RX ORDER — POTASSIUM CHLORIDE 29.8 MG/ML
40 INJECTION INTRAVENOUS ONCE
Status: DISCONTINUED | OUTPATIENT
Start: 2018-12-31 | End: 2018-12-31

## 2018-12-31 RX ORDER — LIDOCAINE HYDROCHLORIDE 10 MG/ML
INJECTION, SOLUTION INFILTRATION; PERINEURAL CODE/TRAUMA/SEDATION MEDICATION
Status: COMPLETED | OUTPATIENT
Start: 2018-12-31 | End: 2018-12-31

## 2018-12-31 RX ORDER — POTASSIUM CHLORIDE 14.9 MG/ML
20 INJECTION INTRAVENOUS
Status: COMPLETED | OUTPATIENT
Start: 2018-12-31 | End: 2019-01-01

## 2018-12-31 RX ORDER — SACCHAROMYCES BOULARDII 250 MG
250 CAPSULE ORAL 2 TIMES DAILY
Status: DISCONTINUED | OUTPATIENT
Start: 2018-12-31 | End: 2019-01-01 | Stop reason: HOSPADM

## 2018-12-31 RX ADMIN — LIDOCAINE HYDROCHLORIDE 10 ML: 10 INJECTION, SOLUTION INFILTRATION; PERINEURAL at 08:57

## 2018-12-31 RX ADMIN — HEPARIN SODIUM 5000 UNITS: 5000 INJECTION, SOLUTION INTRAVENOUS; SUBCUTANEOUS at 06:18

## 2018-12-31 RX ADMIN — NICOTINE 1 PATCH: 21 PATCH TRANSDERMAL at 09:34

## 2018-12-31 RX ADMIN — PANTOPRAZOLE SODIUM 40 MG: 40 INJECTION, POWDER, FOR SOLUTION INTRAVENOUS at 09:33

## 2018-12-31 RX ADMIN — ALBUMIN HUMAN 50 G: 0.25 SOLUTION INTRAVENOUS at 13:29

## 2018-12-31 RX ADMIN — CEFTRIAXONE SODIUM 1000 MG: 10 INJECTION, POWDER, FOR SOLUTION INTRAVENOUS at 00:45

## 2018-12-31 RX ADMIN — PANTOPRAZOLE SODIUM 40 MG: 40 INJECTION, POWDER, FOR SOLUTION INTRAVENOUS at 21:10

## 2018-12-31 RX ADMIN — Medication 250 MG: at 13:30

## 2018-12-31 RX ADMIN — VANCOMYCIN HYDROCHLORIDE 125 MG: 500 INJECTION, POWDER, LYOPHILIZED, FOR SOLUTION INTRAVENOUS at 18:37

## 2018-12-31 RX ADMIN — ALBUMIN HUMAN 50 G: 0.25 SOLUTION INTRAVENOUS at 20:36

## 2018-12-31 RX ADMIN — VANCOMYCIN HYDROCHLORIDE 125 MG: 500 INJECTION, POWDER, LYOPHILIZED, FOR SOLUTION INTRAVENOUS at 14:32

## 2018-12-31 RX ADMIN — Medication 250 MG: at 18:37

## 2018-12-31 RX ADMIN — POTASSIUM CHLORIDE 20 MEQ: 200 INJECTION, SOLUTION INTRAVENOUS at 15:40

## 2018-12-31 RX ADMIN — POTASSIUM CHLORIDE 20 MEQ: 200 INJECTION, SOLUTION INTRAVENOUS at 18:11

## 2018-12-31 NOTE — PROGRESS NOTES
[facility] Progress Note  Patient: Rebekah Brice 46 y o  male   MRN: [id number]  PCP: No primary care provider on file  Unit/Bed#: -01 Encounter: [id number]  Date Of Visit: 18    Assessment:    Principal Problem:    Abdominal pain  Active Problems:    ETOH abuse    PE (pulmonary thromboembolism) (HCC)    Tobacco abuse    Hypoalbuminemia    Hypertension    Melena    Alcoholic cirrhosis (HCC)    Ascites      Plan:    · Abdominal pain setting of Spontaneous bacterial peritonitis:  Status post paracenteses  Continue Rocephin  · C-Diff infection:  Continue oral vancomycin  · Alcoholic cirrhosis complicated by ascites:  Continue IV antibiotic  Follow up further GI recommendation  · Alcohol abuse:  Continue CIWA protocol  · History PE:  Significant thrombocytopenia after heparin  I will discontinue heparin  · Hypokalemia:  Replace K       VTE Pharmacologic Prophylaxis:   Pharmacologic: Pharmacologic VTE Prophylaxis contraindicated due to Thrombocytopenia  Mechanical VTE Prophylaxis in Place: Yes    Patient Centered Rounds: I have performed bedside rounds with nursing staff today  Discussions with Specialists or Other Care Team Provider:  GI    Education and Discussions with Family / Patient:  Patient    Time Spent for Care: 20 minutes  More than 50% of total time spent on counseling and coordination of care as described above  Current Length of Stay: 1 day(s)    Current Patient Status: Inpatient   Certification Statement: The patient will continue to require additional inpatient hospital stay due to Acute above condition on IV antibiotic    Discharge Plan / Estimated Discharge Date:  Depends on clinical course    Code Status: Level 1 - Full Code      Subjective:   Patient states abdominal pain has been better      Objective:     Vitals:   Temp (24hrs), Av 1 °F (37 3 °C), Min:98 3 °F (36 8 °C), Max:99 5 °F (37 5 °C)    Temp:  [98 3 °F (36 8 °C)-99 5 °F (37 5 °C)] 98 3 °F (36 8 °C)  HR:  [] 88  Resp:  [16-18] 18  BP: (110-132)/(62-76) 110/76  SpO2:  [92 %-96 %] 94 %  Body mass index is 21 71 kg/m²  Input and Output Summary (last 24 hours): Intake/Output Summary (Last 24 hours) at 12/31/18 1249  Last data filed at 12/31/18 1001   Gross per 24 hour   Intake               20 ml   Output                0 ml   Net               20 ml       Physical Exam:     Physical Exam   Constitutional: He is oriented to person, place, and time  No distress  Cardiovascular: Normal rate, regular rhythm, normal heart sounds and intact distal pulses  Exam reveals no gallop  Pulmonary/Chest: Effort normal and breath sounds normal  No respiratory distress  He has no wheezes  He has no rales  He exhibits no tenderness  Abdominal: Bowel sounds are normal  He exhibits no distension and no mass  There is no tenderness  There is no rebound and no guarding  Musculoskeletal: He exhibits no edema or tenderness  Neurological: He is alert and oriented to person, place, and time  He has normal reflexes  No cranial nerve deficit  Coordination normal    Skin: He is not diaphoretic  There is pallor  Additional Data:     Labs:      Results from last 7 days  Lab Units 12/31/18  0708 12/30/18  0211   WBC Thousand/uL 6 80 7 69   HEMOGLOBIN g/dL 10 9* 13 4   HEMATOCRIT % 31 6* 38 8   PLATELETS Thousands/uL 76* 141*   NEUTROS PCT %  --  84*   LYMPHS PCT %  --  10*   MONOS PCT %  --  5   EOS PCT %  --  0       Results from last 7 days  Lab Units 12/31/18  0708   POTASSIUM mmol/L 2 8*   CHLORIDE mmol/L 101   CO2 mmol/L 25   BUN mg/dL 13   CREATININE mg/dL 0 76   CALCIUM mg/dL 8 8   ALK PHOS U/L 221*   ALT U/L 18   AST U/L 48*       Results from last 7 days  Lab Units 12/31/18  1130   INR  1 40*       * I Have Reviewed All Lab Data Listed Above  * Additional Pertinent Lab Tests Reviewed:  All Labs Within Last 24 Hours Reviewed    Imaging:    Imaging Reports Reviewed Today Include:   Imaging Personally Reviewed by Myself Includes:     Recent Cultures (last 7 days):       Results from last 7 days  Lab Units 12/31/18  0919 12/30/18  1911   GRAM STAIN RESULT  1+ Polys  No organisms seen  --    C DIFF TOXIN B   --  POSITIVE for C difficle toxin by PCR  *       Last 24 Hours Medication List:     Current Facility-Administered Medications:  [START ON 1/2/2019] albumin human 25 g Intravenous TID Kelly Rosas PA-C   albumin human 50 g Intravenous BID Alva Almanzar PA-C   [START ON 1/1/2019] cefTRIAXone 1,000 mg Intravenous Q24H Kelly Rosas PA-C   heparin (porcine) 5,000 Units Subcutaneous Q8H Yuri Huff MD   nicotine 1 patch Transdermal Daily Juani Bacon MD   ondansetron 4 mg Intravenous Q6H PRN Juani Bacon MD   pantoprazole 40 mg Intravenous Q12H Yuri Huff MD   potassium chloride 20 mEq Intravenous Glennis Kehr, MD        Today, Patient Was Seen By: Breezy Wilson MD    ** Please Note: This note has been constructed using a voice recognition system   **

## 2018-12-31 NOTE — PROGRESS NOTES
GI Progress Note - Yuliana Swanson 46 y o  male MRN: [id number]    Unit/Bed#: -01 Encounter: [id number]    Subjective: He had a paracentesis this morning which showed SBP  He reports heavy alcohol use last week and doesn't recall all the events  During this time is when his ascites started to reaccumulate and become painful  He was having nausea, vomiting, and diarrhea as well but he doesn't think it was bloody  He was taking diuretics at home prior to this admission which was controlling his edema and ascites up until he started to drink again  Objective:     Vitals: Blood pressure 110/76, pulse 88, temperature 98 3 °F (36 8 °C), temperature source Oral, resp  rate 18, height 6' (1 829 m), weight 72 6 kg (160 lb 0 9 oz), SpO2 94 %  ,Body mass index is 21 71 kg/m²        Intake/Output Summary (Last 24 hours) at 12/31/18 1151  Last data filed at 12/31/18 1001   Gross per 24 hour   Intake               20 ml   Output                0 ml   Net               20 ml       Physical Exam:     General Appearance: Alert, oriented x3, appears chronically ill and older than stated age  Lungs: Clear to auscultation bilaterally, no respiratory distress  Heart: RRR, no murmur  Abdomen: Non-distended, soft, BS active, NTTP  Extremities: No cyanosis or LE edema     Invasive Devices     Peripheral Intravenous Line            Peripheral IV 12/30/18 Right Antecubital 1 day                Lab Results:    Results from last 7 days  Lab Units 12/31/18  0708 12/30/18  0211   WBC Thousand/uL 6 80 7 69   HEMOGLOBIN g/dL 10 9* 13 4   HEMATOCRIT % 31 6* 38 8   PLATELETS Thousands/uL 76* 141*   NEUTROS PCT %  --  84*   LYMPHS PCT %  --  10*   MONOS PCT %  --  5   EOS PCT %  --  0       Results from last 7 days  Lab Units 12/31/18  0708   POTASSIUM mmol/L 2 8*   CHLORIDE mmol/L 101   CO2 mmol/L 25   BUN mg/dL 13   CREATININE mg/dL 0 76   CALCIUM mg/dL 8 8   ALK PHOS U/L 221*   ALT U/L 18   AST U/L 48*       Results from last 7 days  Lab Units 12/30/18  0630   INR  1 27*       Results from last 7 days  Lab Units 12/30/18  0211   LIPASE u/L 125       Imaging Studies: I have personally reviewed pertinent imaging studies  Ct Abdomen Pelvis With Contrast  Result Date: 12/30/2018  Impression: Cirrhosis and sequela of portal hypertension evidenced by recanalized umbilical vein and abdominal ascites  There is some irregular enhancement of the right peritoneal reflection for which SBP cannot be excluded  Clinical correlation is necessary  Heterogeneous attenuation of the liver, favored to represent patchy fatty infiltration given the extensive fatty liver change on the previous study  Correlate with alpha fetal protein levels and baseline evaluation with MRI abdomen with IV contrast may be considered on a nonemergent basis  Diffuse wall thickening of small and large bowel which could reflect infectious or inflammatory enterocolitis versus portal hypertensive enterocolopathy  No evidence of bowel obstruction  Thickening of the distal esophagus which may in part reflect hiatal hernia and/or element of esophagitis  If relevant, this could be correlated with direct visualization  No discrete esophageal varices seen  Mild circumferential bladder wall thickening without perivesical inflammatory changes, favored to represent underdistention as opposed to cystitis  If there is superpubic tenderness, urinalysis may be helpful          Assessment and Plan:     SBP  Ascites  - S/P paracentesis this morning of 950 mL, fluid analysis consistent with SBP  - Continue IV ceftriaxone 1 g daily  - Add IV albumin 1 5 g/kg day 1 (today) and 1 g/kg day 3 (Wednesday) to prevent acute renal failure  - Overall his symptom have improved with less abdominal pain and no further N/V  - He should complete a 10-14 day course total of antibiotics, can likely transition to oral abx after Wednesday  - He will need SBP prophylaxis in the future with daily abx but this may need to be reconsidered especially now with Cdiff    Diarrhea  - Cdiff PCR positive  - Start vanco 125 mg q6h for 10-14 days  - Serial abdominal exams  - Pt reports recent hospitalization at the end of November    Alcoholic Cirrhosis  - MELD 12, Child Class B  - Monitor MELD labs daily  - Grade 0 hepatic encephalopathy  - Currently being treated for SBP as above; he will need SBP prophylaxis in the future and should continue on diuretics for his ascites which he reports he takes at home  - CT on admission neg for HCC, AFP mildly elevated  - Pt reports EGD 1-2 years ago neg for varices; he should have a repeat EGD as outpatient for variceal screening, no current evidence of GI bleeding  - Not a liver transplant candidate due to low MELD and active drinking      The patient will be seen by Dr Cora Javed

## 2018-12-31 NOTE — CONSULTS
Consultation - 126 [facility]  Matt Diaz 46 y o  male MRN: [id number]  Unit/Bed#: -01 Encounter: [id number]        Consults    Reason for Consult / Principal Problem:   Chief Complaint   Patient presents with    Abdominal Pain     Pt states that he recently went on a drinking binge this week after being sober for a couple months  Pt c/o abdominal distention, pain, N/V           ASSESSMENT AND PLAN:      Cirrhosis   - secondary to continued alcohol abuse, counseled on alcohol cessation  - r/o HCV  - MELD 11  - complicated by ascites, paracentesis ordered; last tap 6 8 L removed in November 2018  - CT today negative for [address] ; check AFP  - EGD in 2017 negative for varices  - mental status normal    Abdominal pain  - most consistent with abdominal distension secondary to ascites  - vs esophagitis vs infectious colitis  - continue IV antibiotics  - obtain diagnostic/therapeutic paracentesis  - continue protonix for esophagitis seen on CT    ______________________________________________________________________    HPI:      Patient is a 46year old male admitted with abdominal pain  He has a past medical history of PE, non compliant with anticoagulation, alcohol abuse, and cirrhosis complicated by ascites  He admits to recent alcohol binge after 2 months of sobriety  He started to develop acute onset of nausea/vomiting/abdominal pain, prompting ER evaluation  His last paracentesis was 6 8 L removed in November at [facility], where he has been following  He is concerned about increasing abdominal distension  His CT demonstrates cirrhosis with portal hypertension, and abdominal ascites with concern for SBP, as well as enterocolitis  REVIEW OF SYSTEMS:    CONSTITUTIONAL: Denies any fever, chills, rigors, and weight loss  HEENT: No earache or tinnitus  Denies hearing loss or visual disturbances  CARDIOVASCULAR: No chest pain or palpitations     RESPIRATORY: Denies any cough, hemoptysis, shortness of breath or dyspnea on exertion  GASTROINTESTINAL: As noted in the History of Present Illness  GENITOURINARY: No problems with urination  Denies any hematuria or dysuria  NEUROLOGIC: No dizziness or vertigo, denies headaches  MUSCULOSKELETAL: Denies any muscle or joint pain  SKIN: Denies skin rashes or itching  ENDOCRINE: Denies excessive thirst  Denies intolerance to heat or cold  PSYCHOSOCIAL: Denies depression or anxiety  Denies any recent memory loss  Historical Information   Past Medical History:   Diagnosis Date    ETOH abuse     Liver cirrhosis ([facility] [address] )      Past Surgical History:   Procedure Laterality Date    COLONOSCOPY N/A 7/3/2017    Procedure: COLONOSCOPY;  Surgeon: Misha Paul MD;  Location: [facility]; Service: Gastroenterology    ESOPHAGOGASTRODUODENOSCOPY N/A 6/25/2017    Procedure: ESOPHAGOGASTRODUODENOSCOPY (EGD); Surgeon: Marianela Chin MD;  Location: [facility]; Service: Gastroenterology     Social History   History   Alcohol Use    Yes     Comment: vodka -approx one bottle every nite  History   Drug Use No     History   Smoking Status    Current Every Day Smoker    Packs/day: 1 00    Years: 25 00   Smokeless Tobacco    Never Used     History reviewed  No pertinent family history  Meds/Allergies     No prescriptions prior to admission       Current Facility-Administered Medications   Medication Dose Route Frequency    [START ON 12/31/2018] cefTRIAXone (ROCEPHIN) 1,000 mg in dextrose 5 % 50 mL IVPB  1,000 mg Intravenous Q24H    heparin (porcine) subcutaneous injection 5,000 Units  5,000 Units Subcutaneous Q8H [address]    nicotine (NICODERM CQ) 21 mg/24 hr TD 24 hr patch 1 patch  1 patch Transdermal Daily    ondansetron (ZOFRAN) injection 4 mg  4 mg Intravenous Q6H PRN    pantoprazole (PROTONIX) injection 40 mg  40 mg Intravenous Q12H [address]       No Known Allergies        Objective     Blood pressure 132/67, pulse (!) 114, temperature 99 5 °F (37 5 °C), temperature source Oral, resp  rate 18, height 6' (1 829 m), weight 72 6 kg (160 lb 0 9 oz), SpO2 95 %  Body mass index is 21 71 kg/m²  Intake/Output Summary (Last 24 hours) at 12/30/18 2126  Last data filed at 12/30/18 1830   Gross per 24 hour   Intake              210 ml   Output                0 ml   Net              210 ml         PHYSICAL EXAM:      General Appearance:   Alert, cooperative, no distress   HEENT:   Normocephalic, atraumatic, anicteric      Neck:  Supple, symmetrical, trachea midline   Lungs:   Clear to auscultation bilaterally; no rales, rhonchi or wheezing; respirations unlabored    Heart[de-identified]   Regular rate and rhythm; no murmur, rub, or gallop  Abdomen:   Soft, non-tender, + fluid wave; normal bowel sounds; no masses, no organomegaly    Genitalia:   Deferred    Rectal:   Deferred    Extremities:  No cyanosis, clubbing or edema    Pulses:  2+ and symmetric all extremities    Skin:  No jaundice, rashes, or lesions    Lymph nodes:  No palpable cervical lymphadenopathy        Lab Results:     Results from last 7 days  Lab Units 12/30/18  0211   WBC Thousand/uL 7 69   HEMOGLOBIN g/dL 13 4   HEMATOCRIT % 38 8   PLATELETS Thousands/uL 141*         Results from last 7 days  Lab Units 12/30/18  0211   SODIUM mmol/L 140   POTASSIUM mmol/L 3 5   CHLORIDE mmol/L 102   CO2 mmol/L 24   BUN mg/dL 17   CREATININE mg/dL 0 85   CALCIUM mg/dL 9 2   ALK PHOS U/L 297*   ALT U/L 27   AST U/L 69*       Imaging Studies:     Ct Abdomen Pelvis With Contrast    Result Date: 12/30/2018  Narrative: CT ABDOMEN AND PELVIS WITH IV CONTRAST INDICATION:   Abdominal pain, distension, and vomiting  History of cirrhosis  COMPARISON:  CT chest, abdomen and pelvis 6/24/2017 TECHNIQUE:  CT examination of the abdomen and pelvis was performed  Delayed images of the abdomen were also obtained  Axial, sagittal, and coronal 2D reformatted images were created from the source data and submitted for interpretation  Radiation dose length product (DLP) for this visit:  519 mGy-cm   This examination, like all CT scans performed in the [facility], was performed utilizing techniques to minimize radiation dose exposure, including the use of iterative reconstruction and automated exposure control  IV Contrast:  100 mL of iohexol (OMNIPAQUE) Enteric Contrast:  Enteric contrast was not administered  FINDINGS: ABDOMEN LOWER CHEST:  There is thickening of the distal esophagus which may in part reflect hiatal hernia and/or element of esophagitis  LIVER/BILIARY TREE:   Cirrhotic liver morphology  There is decreased attenuation most notably within the right lobe and dome of the left lobe medial segment, suggestive of diffuse fatty infiltrative changes  Extensive fatty infiltration was present on the previous CT study  There is no discrete focal mass  Evaluation of hepatocellular carcinoma limited without arterial phase imaging (not performed for this study)  There is slightly heterogeneous attenuation of the left lobe also noted  The right hepatic vein is not as well visualized as the middle and left hepatic veins although is probably patent  The portal veins are patent  There is no intrahepatic duct dilatation  Recanalized umbilical vein in keeping with portal hypertension  GALLBLADDER:  No calcified gallstones  Pericholecystic edema, nonspecific in the setting of ascites  If there is clinical concern for cholecystitis, nuclear medicine HIDA scan may be obtained  SPLEEN:  Unremarkable  PANCREAS:  Unremarkable  ADRENAL GLANDS:  Unremarkable  KIDNEYS/URETERS:  One or more sharply circumscribed subcentimeter renal hypodensities are noted  These lesions are too small to accurately characterize, but are statistically most likely to represent benign cortical renal cyst(s)    According to the guidelines published in the [facility] Paper of the ACR Incidental Findings Committee (Radiology 2010), no further workup of these lesions is recommended  No hydronephrosis  STOMACH AND BOWEL:  The stomach is poorly distended, evaluation limited  Small bowel is normal caliber  There is diffuse wall thickening seen throughout small bowel loops as well as the colon which could reflect infectious or inflammatory enterocolitis versus portal hypertensive enterocolopathy  APPENDIX:  No findings to suggest appendicitis  ABDOMINOPELVIC CAVITY:  No free intraperitoneal air  No lymphadenopathy  Small to modest amount of abdominal and pelvic ascites  There is some irregular enhancement seen along the peritoneum along the posterior right peritoneal reflection  The possibility of spontaneous bacterial peritonitis (SBP) cannot be excluded  This is new from the previous study as is the ascites  VESSELS:  Ectatic atherosclerotic abdominal aorta measuring up to 2 7 cm  PELVIS REPRODUCTIVE ORGANS:  Unremarkable for patient's age  URINARY BLADDER:  Mild circumferential bladder wall thickening without perivesical inflammatory changes, favored to represent underdistention as opposed to cystitis  ABDOMINAL WALL/INGUINAL REGIONS:  Unremarkable  OSSEOUS STRUCTURES:  No acute fracture or destructive osseous lesion  Degenerative disc disease L1-2 and L5-S1  Impression: Cirrhosis and sequela of portal hypertension evidenced by recanalized umbilical vein and abdominal ascites  There is some irregular enhancement of the right peritoneal reflection for which SBP cannot be excluded  Clinical correlation is necessary  Heterogeneous attenuation of the liver, favored to represent patchy fatty infiltration given the extensive fatty liver change on the previous study  Correlate with alpha fetal protein levels and baseline evaluation with MRI abdomen with IV contrast may be considered on a nonemergent basis  Diffuse wall thickening of small and large bowel which could reflect infectious or inflammatory enterocolitis versus portal hypertensive enterocolopathy    No evidence of bowel obstruction  Thickening of the distal esophagus which may in part reflect hiatal hernia and/or element of esophagitis  If relevant, this could be correlated with direct visualization  No discrete esophageal varices seen  Mild circumferential bladder wall thickening without perivesical inflammatory changes, favored to represent underdistention as opposed to cystitis  If there is superpubic tenderness, urinalysis may be helpful    I personally discussed this study with Rizwana Ramirez on 12/30/2018 at 8:29 AM  Workstation performed: [id number]

## 2018-12-31 NOTE — UTILIZATION REVIEW
Initial Clinical Review    Admission: Date/Time/Statement: inpatient 12/30/18 @ 0909     Orders Placed This Encounter   Procedures    Inpatient Admission (expected length of stay for this patient is greater than two midnights)     Standing Status:   Standing     Number of Occurrences:   1     Order Specific Question:   Admitting Physician     Answer:   Meghna Polanco [[id number]]     Order Specific Question:   Level of Care     Answer:   Med Surg [[id number]]     Order Specific Question:   Estimated length of stay     Answer:   More than 2 Midnights     Order Specific Question:   Certification     Answer:   I certify that inpatient services are medically necessary for this patient for a duration of greater than two midnights  See H&P and MD Progress Notes for additional information about the patient's course of treatment  ED: Date/Time/Mode of Arrival:   ED Arrival Information     Expected Arrival Acuity Means of Arrival Escorted By Service Admission Type    - 12/30/2018 01:32 Urgent Ambulance [facility] Urgent    Arrival Complaint    -          Chief Complaint:   Chief Complaint   Patient presents with    Abdominal Pain     Pt states that he recently went on a drinking binge this week after being sober for a couple months  Pt c/o abdominal distention, pain, N/V  History of Illness: Rebekah Brice is a 46 y o  male with past medical history of alcohol abuse, cirrhosis, history of PE who presents with nausea, vomiting, abdominal pain  He states that he went on binge drinking over the weekend  He developed nausea, vomiting and abdominal pain, presented to ER for further evaluation  No fevers  Positive for chills  Had some diarrhea and noted black colored stools  No hematemesis or coffee-ground emesis  No blood in the stool  Also noticed abdominal distention worsening over the last few days    His last paracentesis was in mid November at [facility] when 6 8 L of fluid removed       ED Vital Signs:   ED Triage Vitals [12/30/18 0138]   Temperature Pulse Respirations Blood Pressure SpO2   98 1 °F (36 7 °C) (!) 106 20 144/82 97 %      Temp Source Heart Rate Source Patient Position - Orthostatic VS BP Location FiO2 (%)   Oral Monitor Lying Right arm --      Pain Score       1        Wt Readings from Last 1 Encounters:   12/30/18 72 6 kg (160 lb 0 9 oz)       Vital Signs (abnormal): , 105, 109, 109, 109, 107, 111, 116, 114, 105  Temp 99 5   99 4    Abnormal Labs/Diagnostic Test Results:   A gap 14   k 3 5, 2 8   ast 69, 48   Alk phos 297, 221   t prot 7 6, 6 3   Alb 3, 2 4   t bili 1 6, 1 7   Direct bili   61   Lactic acid 2 5  hgb 13 4, 10 9  hct 38 8, 31 6    Plate 301, 76  Pt 37 4, 17   inr 1 27, 1 4  C  Diff positive  Paracentesis: hazy  Wbc 605   neut 51   Lymph 21   Mono 2   mononuc 26  etoh 177  AFP tumor marker 8 3  procalc wnl    CT a&P: Cirrhosis and sequela of portal hypertension evidenced by recanalized umbilical vein and abdominal ascites    There is some irregular enhancement of the right peritoneal reflection for which SBP cannot be excluded  Clinical correlation is necessary       Heterogeneous attenuation of the liver, favored to represent patchy fatty infiltration given the extensive fatty liver change on the previous study   Correlate with alpha fetal protein levels and baseline evaluation with MRI abdomen with IV contrast may   be considered on a nonemergent basis  Diffuse wall thickening of small and large bowel which could reflect infectious or inflammatory enterocolitis versus portal hypertensive enterocolopathy   No evidence of bowel obstruction  Thickening of the distal esophagus which may in part reflect hiatal hernia and/or element of esophagitis   If relevant, this could be correlated with direct visualization   No discrete esophageal varices seen      Mild circumferential bladder wall thickening without perivesical inflammatory changes, favored to represent underdistention as opposed to cystitis  If there is superpubic tenderness, urinalysis may be helpful            ED Treatment:   Medication Administration from 12/30/2018 0132 to 12/30/2018 1811       Date/Time Order Dose Route Action     12/30/2018 0251 ondansetron (ZOFRAN) injection 4 mg 4 mg Intravenous Given     12/30/2018 0454 ondansetron (ZOFRAN) injection 4 mg 4 mg Intravenous Given     12/30/2018 0629 metoclopramide (REGLAN) injection 10 mg 10 mg Intravenous Given     12/30/2018 0923 cefTRIAXone (ROCEPHIN) 1,000 mg in dextrose 5 % 50 mL IVPB 1,000 mg Intravenous New Bag     12/30/2018 0923 pantoprazole (PROTONIX) injection 40 mg 40 mg Intravenous Given     12/30/2018 1418 nicotine (NICODERM CQ) 21 mg/24 hr TD 24 hr patch 1 patch 1 patch Transdermal Medication Applied          Past Medical/Surgical History:    Active Ambulatory Problems     Diagnosis Date Noted    ETOH abuse 06/27/2017    PE (pulmonary throm[facility]embolism) ([facility] ) 06/27/2017    Tobacco abuse 06/27/2017    Hypoalbuminemia 06/27/2017    Hypertension 07/05/2017    Impotence of organic origin 03/15/2007    Testicular hypofunction 09/21/2007    Unilateral inguinal hernia 03/15/2007     Resolved Ambulatory Problems     Diagnosis Date Noted    [facility] withdrawal syndrome[facility] with delirium [facility] ) 06/27/2017    Syncope 06/27/2017    CAP (community acquired pneumonia) 06/27/2017    Acute blood loss anemia 06/27/2017    [facility] respiratory failure ([facility] ) 06/27/2017    Hypokalemia 06/27/2017    Hyperbilirubinemia 06/27/2017     Past Medical History:   Diagnosis Date    ETOH abuse     Liver cirrhosis ([facility] )        Admitting Diagnosis: Spontaneous bacterial peritonitis ([facility] ) [K65 2]  Alcohol abuse [F10 10]  Abdominal distension [R14 0]  Cirrhosis ([facility] ) [K74 60]  Abdominal pain [R10 9]  Nausea & vomiting [R11 2]  Ascites [R18 8]    Age/Sex: 46 y o  male    Assessment/Plan:    Abdominal pain  Differentials-gastritis versus esophagitis versus peptic ulcer disease versus SBP versus colitis  Lipase normal   Status post CT of the abdomen demonstrated findings suggestive of cirrhosis with portal hypertension  Also changes concerning for infectious/inflammatory colitis  Thickening of distal esophagus could be a component of esophagitis or varices seen  Continue IV Rocephin  Consult IR for diagnostic and therapeutic paracentesis  Continue Protonix IV b i d  Consult GI     Alcoholic cirrhosis complicated by ascites  [address] 11 as of today's labs  Ammonia levels normal   Continue IV Rocephin for SBP prophylaxis  GI following     Alcohol abuse  Last drink yesterday night  Alcohol levels on admission 177  On CIWA protocol  Monitor for signs and symptoms of withdrawal  Anticipated Length of Stay:  Patient will be admitted on an Inpatient basis with an anticipated length of stay of  Greater 2 midnights        Admission Orders:  Scheduled Meds:   Current Facility-Administered Medications:  [START ON 1/2/2019] albumin human 25 g Intravenous TID   albumin human 50 g Intravenous BID   [START ON 1/1/2019] cefTRIAXone 1,000 mg Intravenous Q24H   nicotine 1 patch Transdermal Daily   ondansetron 4 mg Intravenous Q6H PRN   pantoprazole 40 mg Intravenous Q12H [facility]   saccharomyces boulardii 250 mg Oral BID   vancomycin 125 mg Oral Q6H [facility]     IV albumin x 1  Cons GI  CIWA protocol  scd's  oob as kathleen  Cont pulse ox  Seizure prec  IR paracentesis  Chronic hep panel  Lactic acid, pt, inr, cmp, cbc in am  hse 2gm na diet      Per GI  Cirrhosis   - secondary to continued alcohol abuse, counseled on alcohol cessation  - r/o HCV  - MELD 11  - complicated by ascites, paracentesis ordered; last tap 6 8 L removed in November 2018  - CT today negative for [address] ; check AFP  - EGD in 2017 negative for varices  - mental status normal     Abdominal pain  - most consistent with abdominal distension secondary to ascites  - vs esophagitis vs infectious colitis  - continue IV antibiotics  - obtain diagnostic/therapeutic paracentesis  - continue protonix for esophagitis seen on CT

## 2018-12-31 NOTE — PLAN OF CARE
CARDIOVASCULAR - ADULT     Absence of cardiac dysrhythmias or at baseline rhythm Progressing        DISCHARGE PLANNING     Discharge to home or other facility with appropriate resources Progressing        HEMATOLOGIC - ADULT     Maintains hematologic stability Progressing        INFECTION - ADULT     Absence or prevention of progression during hospitalization Progressing        Knowledge Deficit     Patient/family/caregiver demonstrates understanding of disease process, treatment plan, medications, and discharge instructions Progressing        NEUROSENSORY - ADULT     Absence of seizures Progressing     Remains free of injury related to seizures activity Progressing        PAIN - ADULT     Verbalizes/displays adequate comfort level or baseline comfort level Progressing        Potential for Falls     Patient will remain free of falls Progressing        RESPIRATORY - ADULT     Achieves optimal ventilation and oxygenation Progressing        SAFETY ADULT     Patient will remain free of falls Progressing     Maintain or return to baseline ADL function Progressing     Maintain or return mobility status to optimal level Progressing

## 2019-01-01 VITALS
TEMPERATURE: 98.3 F | WEIGHT: 160.05 LBS | RESPIRATION RATE: 18 BRPM | SYSTOLIC BLOOD PRESSURE: 120 MMHG | BODY MASS INDEX: 21.68 KG/M2 | HEART RATE: 90 BPM | OXYGEN SATURATION: 97 % | DIASTOLIC BLOOD PRESSURE: 68 MMHG | HEIGHT: 72 IN

## 2019-01-01 PROBLEM — K65.2 SBP (SPONTANEOUS BACTERIAL PERITONITIS) (HCC): Status: ACTIVE | Noted: 2019-01-01

## 2019-01-01 PROBLEM — A04.72 CLOSTRIDIUM DIFFICILE COLITIS: Status: ACTIVE | Noted: 2019-01-01

## 2019-01-01 LAB
ALBUMIN SERPL BCP-MCNC: 3.4 G/DL (ref 3.5–5)
ALP SERPL-CCNC: 196 U/L (ref 46–116)
ALT SERPL W P-5'-P-CCNC: 18 U/L (ref 12–78)
ANION GAP SERPL CALCULATED.3IONS-SCNC: 12 MMOL/L (ref 4–13)
AST SERPL W P-5'-P-CCNC: 40 U/L (ref 5–45)
BILIRUB SERPL-MCNC: 2.1 MG/DL (ref 0.2–1)
BUN SERPL-MCNC: 11 MG/DL (ref 5–25)
CALCIUM SERPL-MCNC: 9.2 MG/DL (ref 8.3–10.1)
CHLORIDE SERPL-SCNC: 101 MMOL/L (ref 100–108)
CO2 SERPL-SCNC: 25 MMOL/L (ref 21–32)
CREAT SERPL-MCNC: 0.76 MG/DL (ref 0.6–1.3)
ERYTHROCYTE [DISTWIDTH] IN BLOOD BY AUTOMATED COUNT: 13.8 % (ref 11.6–15.1)
GFR SERPL CREATININE-BSD FRML MDRD: 103 ML/MIN/1.73SQ M
GLUCOSE SERPL-MCNC: 98 MG/DL (ref 65–140)
HBV CORE AB SER QL: NORMAL
HBV CORE IGM SER QL: NORMAL
HBV SURFACE AG SER QL: NORMAL
HCT VFR BLD AUTO: 34 % (ref 36.5–49.3)
HCV AB SER QL: NORMAL
HGB BLD-MCNC: 11.8 G/DL (ref 12–17)
INR PPP: 1.41 (ref 0.86–1.17)
LACTATE SERPL-SCNC: 1.7 MMOL/L (ref 0.5–2)
MCH RBC QN AUTO: 31.6 PG (ref 26.8–34.3)
MCHC RBC AUTO-ENTMCNC: 34.7 G/DL (ref 31.4–37.4)
MCV RBC AUTO: 91 FL (ref 82–98)
PLATELET # BLD AUTO: 67 THOUSANDS/UL (ref 149–390)
PMV BLD AUTO: 11.7 FL (ref 8.9–12.7)
POTASSIUM SERPL-SCNC: 2.7 MMOL/L (ref 3.5–5.3)
PROT SERPL-MCNC: 6.8 G/DL (ref 6.4–8.2)
PROTHROMBIN TIME: 17.1 SECONDS (ref 11.8–14.2)
RBC # BLD AUTO: 3.73 MILLION/UL (ref 3.88–5.62)
SODIUM SERPL-SCNC: 138 MMOL/L (ref 136–145)
WBC # BLD AUTO: 5.92 THOUSAND/UL (ref 4.31–10.16)

## 2019-01-01 PROCEDURE — 85027 COMPLETE CBC AUTOMATED: CPT | Performed by: PHYSICIAN ASSISTANT

## 2019-01-01 PROCEDURE — 94762 N-INVAS EAR/PLS OXIMTRY CONT: CPT

## 2019-01-01 PROCEDURE — 99239 HOSP IP/OBS DSCHRG MGMT >30: CPT | Performed by: PHYSICIAN ASSISTANT

## 2019-01-01 PROCEDURE — 87340 HEPATITIS B SURFACE AG IA: CPT | Performed by: PHYSICIAN ASSISTANT

## 2019-01-01 PROCEDURE — 86803 HEPATITIS C AB TEST: CPT | Performed by: PHYSICIAN ASSISTANT

## 2019-01-01 PROCEDURE — 83605 ASSAY OF LACTIC ACID: CPT | Performed by: PHYSICIAN ASSISTANT

## 2019-01-01 PROCEDURE — C9113 INJ PANTOPRAZOLE SODIUM, VIA: HCPCS | Performed by: INTERNAL MEDICINE

## 2019-01-01 PROCEDURE — 86704 HEP B CORE ANTIBODY TOTAL: CPT | Performed by: PHYSICIAN ASSISTANT

## 2019-01-01 PROCEDURE — 86705 HEP B CORE ANTIBODY IGM: CPT | Performed by: PHYSICIAN ASSISTANT

## 2019-01-01 PROCEDURE — 99232 SBSQ HOSP IP/OBS MODERATE 35: CPT | Performed by: INTERNAL MEDICINE

## 2019-01-01 PROCEDURE — 85610 PROTHROMBIN TIME: CPT | Performed by: PHYSICIAN ASSISTANT

## 2019-01-01 PROCEDURE — 99232 SBSQ HOSP IP/OBS MODERATE 35: CPT | Performed by: FAMILY MEDICINE

## 2019-01-01 PROCEDURE — 80053 COMPREHEN METABOLIC PANEL: CPT | Performed by: PHYSICIAN ASSISTANT

## 2019-01-01 RX ORDER — CIPROFLOXACIN 500 MG/1
500 TABLET, FILM COATED ORAL EVERY 12 HOURS SCHEDULED
Qty: 14 TABLET | Refills: 0 | Status: SHIPPED | OUTPATIENT
Start: 2019-01-01 | End: 2019-01-08

## 2019-01-01 RX ORDER — POTASSIUM CHLORIDE 29.8 MG/ML
40 INJECTION INTRAVENOUS ONCE
Status: DISCONTINUED | OUTPATIENT
Start: 2019-01-01 | End: 2019-01-01

## 2019-01-01 RX ORDER — POTASSIUM CHLORIDE 14.9 MG/ML
20 INJECTION INTRAVENOUS
Status: COMPLETED | OUTPATIENT
Start: 2019-01-01 | End: 2019-01-01

## 2019-01-01 RX ORDER — POTASSIUM CHLORIDE 20 MEQ/1
40 TABLET, EXTENDED RELEASE ORAL ONCE
Status: COMPLETED | OUTPATIENT
Start: 2019-01-01 | End: 2019-01-01

## 2019-01-01 RX ADMIN — POTASSIUM CHLORIDE 20 MEQ: 200 INJECTION, SOLUTION INTRAVENOUS at 13:03

## 2019-01-01 RX ADMIN — VANCOMYCIN HYDROCHLORIDE 125 MG: 500 INJECTION, POWDER, LYOPHILIZED, FOR SOLUTION INTRAVENOUS at 12:08

## 2019-01-01 RX ADMIN — VANCOMYCIN HYDROCHLORIDE 125 MG: 500 INJECTION, POWDER, LYOPHILIZED, FOR SOLUTION INTRAVENOUS at 18:06

## 2019-01-01 RX ADMIN — Medication 250 MG: at 08:51

## 2019-01-01 RX ADMIN — Medication 250 MG: at 18:06

## 2019-01-01 RX ADMIN — POTASSIUM CHLORIDE 40 MEQ: 1500 TABLET, EXTENDED RELEASE ORAL at 12:08

## 2019-01-01 RX ADMIN — VANCOMYCIN HYDROCHLORIDE 125 MG: 500 INJECTION, POWDER, LYOPHILIZED, FOR SOLUTION INTRAVENOUS at 05:48

## 2019-01-01 RX ADMIN — VANCOMYCIN HYDROCHLORIDE 125 MG: 500 INJECTION, POWDER, LYOPHILIZED, FOR SOLUTION INTRAVENOUS at 00:43

## 2019-01-01 RX ADMIN — PANTOPRAZOLE SODIUM 40 MG: 40 INJECTION, POWDER, FOR SOLUTION INTRAVENOUS at 08:51

## 2019-01-01 RX ADMIN — POTASSIUM CHLORIDE 20 MEQ: 200 INJECTION, SOLUTION INTRAVENOUS at 15:28

## 2019-01-01 RX ADMIN — CEFTRIAXONE SODIUM 1000 MG: 10 INJECTION, POWDER, FOR SOLUTION INTRAVENOUS at 11:58

## 2019-01-01 NOTE — PROGRESS NOTES
[facility] Progress Note  Patient: Thereasa Nageotte 46 y o  male   MRN: [id number]  PCP: No primary care provider on file  Unit/Bed#: -01 Encounter: [id number]  Date Of Visit: 19    Assessment:    Principal Problem:    Abdominal pain  Active Problems:    ETOH abuse    PE (pulmonary thromboembolism) (HCC)    Tobacco abuse    Hypoalbuminemia    Hypertension    Melena    Alcoholic cirrhosis (HCC)    Ascites    Hypokalemia      Plan:    · Abdominal pain setting of Spontaneous bacterial peritonitis:  Status post paracenteses  Continue Rocephin  · C-Diff infection:  Continue oral vancomycin  · Alcoholic cirrhosis complicated by ascites:  Continue IV antibiotic  Follow up further GI recommendation  · Alcohol abuse:  Continue CIWA protocol  · History PE:  Significant thrombocytopenia after heparin  Heparin was d pain med/c  · Hypokalemia:  Replace K     VTE Pharmacologic Prophylaxis:   Pharmacologic: Pharmacologic VTE Prophylaxis contraindicated due to Thrombocytopenia  Mechanical VTE Prophylaxis in Place: Yes    Patient Centered Rounds: I have performed bedside rounds with nursing staff today  Discussions with Specialists or Other Care Team Provider:     Education and Discussions with Family / Patient: patient    Time Spent for Care: 20 minutes  More than 50% of total time spent on counseling and coordination of care as described above  Current Length of Stay: 2 day(s)    Current Patient Status: Inpatient   Certification Statement: The patient will continue to require additional inpatient hospital stay due to acute above condition    Discharge Plan / Estimated Discharge Date: likely next 24 hrs     Code Status: Level 1 - Full Code      Subjective:   Patient states he is doing better    He wants to go home  Objective:     Vitals:   Temp (24hrs), Av 7 °F (37 1 °C), Min:98 4 °F (36 9 °C), Max:99 °F (37 2 °C)    Temp:  [98 4 °F (36 9 °C)-99 °F (37 2 °C)] 98 7 °F (37 1 °C)  HR: [88-96] 94  Resp:  [18] 18  BP: (115-127)/(68-78) 115/78  SpO2:  [92 %-97 %] 97 %  Body mass index is 21 71 kg/m²  Input and Output Summary (last 24 hours): Intake/Output Summary (Last 24 hours) at 01/01/19 1452  Last data filed at 01/01/19 0949   Gross per 24 hour   Intake              410 ml   Output                0 ml   Net              410 ml       Physical Exam:     Physical Exam   Constitutional: He is oriented to person, place, and time  No distress  Cardiovascular: Normal rate, regular rhythm, normal heart sounds and intact distal pulses  Exam reveals no gallop  Pulmonary/Chest: Effort normal and breath sounds normal  No respiratory distress  He has no wheezes  He has no rales  He exhibits no tenderness  Abdominal: Bowel sounds are normal  He exhibits no distension and no mass  There is no tenderness  There is no rebound and no guarding  Musculoskeletal: He exhibits no edema or tenderness  Neurological: He is alert and oriented to person, place, and time  He has normal reflexes  No cranial nerve deficit  Coordination normal    Skin: He is not diaphoretic  There is pallor  Additional Data:     Labs:      Results from last 7 days  Lab Units 01/01/19  0902  12/30/18  0211   WBC Thousand/uL 5 92  < > 7 69   HEMOGLOBIN g/dL 11 8*  < > 13 4   HEMATOCRIT % 34 0*  < > 38 8   PLATELETS Thousands/uL 67*  < > 141*   NEUTROS PCT %  --   --  84*   LYMPHS PCT %  --   --  10*   MONOS PCT %  --   --  5   EOS PCT %  --   --  0   < > = values in this interval not displayed  Results from last 7 days  Lab Units 01/01/19  0517   POTASSIUM mmol/L 2 7*   CHLORIDE mmol/L 101   CO2 mmol/L 25   BUN mg/dL 11   CREATININE mg/dL 0 76   CALCIUM mg/dL 9 2   ALK PHOS U/L 196*   ALT U/L 18   AST U/L 40       Results from last 7 days  Lab Units 01/01/19  0517   INR  1 41*       * I Have Reviewed All Lab Data Listed Above  * Additional Pertinent Lab Tests Reviewed:  All Labs Within Last 24 Hours Reviewed    Imaging:    Imaging Reports Reviewed Today Include:   Imaging Personally Reviewed by Myself Includes:      Recent Cultures (last 7 days):       Results from last 7 days  Lab Units 12/31/18  0919 12/30/18  1911 12/30/18  0911 12/30/18  0903   BLOOD CULTURE   --   --  No Growth at 24 hrs  No Growth at 24 hrs  GRAM STAIN RESULT  1+ Polys  No organisms seen  --   --   --    BODY FLUID CULTURE, STERILE  No growth  --   --   --    C DIFF TOXIN B   --  POSITIVE for C difficle toxin by PCR  *  --   --        Last 24 Hours Medication List:     Current Facility-Administered Medications:  [START ON 1/2/2019] albumin human 25 g Intravenous TID Micheline Green PA-C    cefTRIAXone 1,000 mg Intravenous Q24H Kelly Rosas PA-C Last Rate: 1,000 mg (01/01/19 1158)   nicotine 1 patch Transdermal Daily Carlos Lagunas MD    ondansetron 4 mg Intravenous Q6H PRN Carlos Lagunas MD    pantoprazole 40 mg Intravenous Q12H Lloyd Garcia MD    potassium chloride 20 mEq Intravenous Q2H Deacon Phipps MD Last Rate: 20 mEq (01/01/19 1303)   saccharomyces boulardii 250 mg Oral BID Marianela Chin MD    vancomycin 125 mg Oral Q6H [address] Micheline Green PA-C         Today, Patient Was Seen By: Deacon Phipps MD    ** Please Note: This note has been constructed using a voice recognition system   ** DISPLAY PLAN FREE TEXT

## 2019-01-01 NOTE — PROGRESS NOTES
Progress Note  Madison Caro 46 y o  male MRN: [id number]  Unit/Bed#: -[facility] Encounter: [id number]    Subjective:  He feels much better today  He is having no fevers chills no nausea vomiting  He reports no fevers  He is still having diarrhea but it is last   He is having no melena hematochezia  He is still having abdominal cramping but is less  Objective:     Vitals:   Vitals:    01/01/19 0700   BP: 115/78   Pulse: 94   Resp: 18   Temp: 98 7 °F (37 1 °C)   SpO2: 92%   ,Body mass index is 21 71 kg/m²        Intake/Output Summary (Last 24 hours) at 01/01/19 0823  Last data filed at 12/31/18 1001   Gross per 24 hour   Intake               10 ml   Output                0 ml   Net               10 ml       Physical Exam:     General Appearance: Alert, appears stated age and cooperative  Lungs: Clear to auscultation bilaterally, no rales or rhonchi, no labored breathing/accessory muscle use  Heart: Regular rate and rhythm, S1, S2 normal, no murmur, click, rub or gallop  Abdomen: Soft, non-tender, distended; bowel sounds normal; no masses or no organomegaly  Extremities: No cyanosis, edema    Invasive Devices     Peripheral Intravenous Line            Peripheral IV 12/30/18 Right Antecubital 2 days                Lab Results:  Admission on 12/30/2018   Component Date Value    WBC 12/30/2018 7 69     RBC 12/30/2018 4 25     Hemoglobin 12/30/2018 13 4     Hematocrit 12/30/2018 38 8     MCV 12/30/2018 91     MCH 12/30/2018 31 5     MCHC 12/30/2018 34 5     RDW 12/30/2018 14 1     MPV 12/30/2018 11 0     Platelets 87/72/9366 141*    nRBC 12/30/2018 0     Neutrophils Relative 12/30/2018 84*    Immat GRANS % 12/30/2018 0     Lymphocytes Relative 12/30/2018 10*    Monocytes Relative 12/30/2018 5     Eosinophils Relative 12/30/2018 0     Basophils Relative 12/30/2018 1     Neutrophils Absolute 12/30/2018 6 48     Immature Grans Absolute 12/30/2018 0 02     Lymphocytes Absolute 12/30/2018 0 74     Monocytes Absolute 12/30/2018 0 40     Eosinophils Absolute 12/30/2018 0 01     Basophils Absolute 12/30/2018 0 04     Sodium 12/30/2018 140     Potassium 12/30/2018 3 5     Chloride 12/30/2018 102     CO2 12/30/2018 24     ANION GAP 12/30/2018 14*    BUN 12/30/2018 17     Creatinine 12/30/2018 0 85     Glucose 12/30/2018 132     Calcium 12/30/2018 9 2     eGFR 12/30/2018 99     Total Bilirubin 12/30/2018 1 60*    Bilirubin, Direct 12/30/2018 0 61*    Alkaline Phosphatase 12/30/2018 297*    AST 12/30/2018 69*    ALT 12/30/2018 27     Total Protein 12/30/2018 7 6     Albumin 12/30/2018 3 0*    Lipase 12/30/2018 125     Ethanol Lvl 12/30/2018 177*    Protime 12/30/2018 15 8*    INR 12/30/2018 1 27*    PTT 12/30/2018 30     Ammonia 12/30/2018 33     Blood Culture 12/30/2018 No Growth at 24 hrs   Blood Culture 12/30/2018 No Growth at 24 hrs   LACTIC ACID 12/30/2018 2 5*    Procalcitonin 12/30/2018 <0 05     C difficile toxin by PCR 12/30/2018 POSITIVE for C difficle toxin by PCR  *    Sodium 12/31/2018 137     Potassium 12/31/2018 2 8*    Chloride 12/31/2018 101     CO2 12/31/2018 25     ANION GAP 12/31/2018 11     BUN 12/31/2018 13     Creatinine 12/31/2018 0 76     Glucose 12/31/2018 94     Calcium 12/31/2018 8 8     AST 12/31/2018 48*    ALT 12/31/2018 18     Alkaline Phosphatase 12/31/2018 221*    Total Protein 12/31/2018 6 3*    Albumin 12/31/2018 2 4*    Total Bilirubin 12/31/2018 1 70*    eGFR 12/31/2018 103     WBC 12/31/2018 6 80     RBC 12/31/2018 3 44*    Hemoglobin 12/31/2018 10 9*    Hematocrit 12/31/2018 31 6*    MCV 12/31/2018 92     MCH 12/31/2018 31 7     MCHC 12/31/2018 34 5     RDW 12/31/2018 14 2     Platelets 93/69/3520 76*    MPV 12/31/2018 11 7     AFP TUMOR MARKER 12/31/2018 8 3*    Site 12/31/2018 Paracentesis     Color, Fluid 12/31/2018 Yellow     Clarity, Fluid 12/31/2018 Hazy*    WBC, Fluid 12/31/2018 605     Gram Stain Result 12/31/2018 1+ Polys     Gram Stain Result 12/31/2018 No organisms seen     Albumin, Fluid 12/31/2018 0 8     Protein, Fluid 12/31/2018 <2 0     LD, Fluid 12/31/2018 67     Total Counted 12/31/2018 100     Neutrophils % (Fluid) 12/31/2018 51     Lymphs % (Fluid) 12/31/2018 21     Mononuclear % (Fluid) 12/31/2018 26     Monocytes % (Fluid) 12/31/2018 2     Protime 12/31/2018 17 0*    INR 12/31/2018 1 40*    LACTIC ACID 12/31/2018 1 3     LACTIC ACID 01/01/2019 1 7     Protime 01/01/2019 17 1*    INR 01/01/2019 1 41*    Sodium 01/01/2019 138     Potassium 01/01/2019 2 7*    Chloride 01/01/2019 101     CO2 01/01/2019 25     ANION GAP 01/01/2019 12     BUN 01/01/2019 11     Creatinine 01/01/2019 0 76     Glucose 01/01/2019 98     Calcium 01/01/2019 9 2     AST 01/01/2019 40     ALT 01/01/2019 18     Alkaline Phosphatase 01/01/2019 196*    Total Protein 01/01/2019 6 8     Albumin 01/01/2019 3 4*    Total Bilirubin 01/01/2019 2 10*    eGFR 01/01/2019 103        Imaging Studies:   I have personally reviewed pertinent imaging studies  Ct Abdomen Pelvis With Contrast    Result Date: 12/30/2018  Narrative: CT ABDOMEN AND PELVIS WITH IV CONTRAST INDICATION:   Abdominal pain, distension, and vomiting  History of cirrhosis  COMPARISON:  CT chest, abdomen and pelvis 6/24/2017 TECHNIQUE:  CT examination of the abdomen and pelvis was performed  Delayed images of the abdomen were also obtained  Axial, sagittal, and coronal 2D reformatted images were created from the source data and submitted for interpretation  Radiation dose length product (DLP) for this visit:  519 mGy-cm   This examination, like all CT scans performed in the [facility], was performed utilizing techniques to minimize radiation dose exposure, including the use of iterative reconstruction and automated exposure control   IV Contrast:  100 mL of iohexol (OMNIPAQUE) Enteric Contrast:  Enteric contrast was not administered  FINDINGS: ABDOMEN LOWER CHEST:  There is thickening of the distal esophagus which may in part reflect hiatal hernia and/or element of esophagitis  LIVER/BILIARY TREE:   Cirrhotic liver morphology  There is decreased attenuation most notably within the right lobe and dome of the left lobe medial segment, suggestive of diffuse fatty infiltrative changes  Extensive fatty infiltration was present on the previous CT study  There is no discrete focal mass  Evaluation of hepatocellular carcinoma limited without arterial phase imaging (not performed for this study)  There is slightly heterogeneous attenuation of the left lobe also noted  The right hepatic vein is not as well visualized as the middle and left hepatic veins although is probably patent  The portal veins are patent  There is no intrahepatic duct dilatation  Recanalized umbilical vein in keeping with portal hypertension  GALLBLADDER:  No calcified gallstones  Pericholecystic edema, nonspecific in the setting of ascites  If there is clinical concern for cholecystitis, nuclear medicine HIDA scan may be obtained  SPLEEN:  Unremarkable  PANCREAS:  Unremarkable  ADRENAL GLANDS:  Unremarkable  KIDNEYS/URETERS:  One or more sharply circumscribed subcentimeter renal hypodensities are noted  These lesions are too small to accurately characterize, but are statistically most likely to represent benign cortical renal cyst(s)  According to the guidelines published in the [facility] Paper of the ACR Incidental Findings Committee (Radiology 2010), no further workup of these lesions is recommended  No hydronephrosis  STOMACH AND BOWEL:  The stomach is poorly distended, evaluation limited  Small bowel is normal caliber  There is diffuse wall thickening seen throughout small bowel loops as well as the colon which could reflect infectious or inflammatory enterocolitis versus portal hypertensive enterocolopathy  APPENDIX:  No findings to suggest appendicitis  ABDOMINOPELVIC CAVITY:  No free intraperitoneal air  No lymphadenopathy  Small to modest amount of abdominal and pelvic ascites  There is some irregular enhancement seen along the peritoneum along the posterior right peritoneal reflection  The possibility of spontaneous bacterial peritonitis (SBP) cannot be excluded  This is new from the previous study as is the ascites  VESSELS:  Ectatic atherosclerotic abdominal aorta measuring up to 2 7 cm  PELVIS REPRODUCTIVE ORGANS:  Unremarkable for patient's age  URINARY BLADDER:  Mild circumferential bladder wall thickening without perivesical inflammatory changes, favored to represent underdistention as opposed to cystitis  ABDOMINAL WALL/INGUINAL REGIONS:  Unremarkable  OSSEOUS STRUCTURES:  No acute fracture or destructive osseous lesion  Degenerative disc disease L1-2 and L5-S1  Impression: Cirrhosis and sequela of portal hypertension evidenced by recanalized umbilical vein and abdominal ascites  There is some irregular enhancement of the right peritoneal reflection for which SBP cannot be excluded  Clinical correlation is necessary  Heterogeneous attenuation of the liver, favored to represent patchy fatty infiltration given the extensive fatty liver change on the previous study  Correlate with alpha fetal protein levels and baseline evaluation with MRI abdomen with IV contrast may be considered on a nonemergent basis  Diffuse wall thickening of small and large bowel which could reflect infectious or inflammatory enterocolitis versus portal hypertensive enterocolopathy  No evidence of bowel obstruction  Thickening of the distal esophagus which may in part reflect hiatal hernia and/or element of esophagitis  If relevant, this could be correlated with direct visualization  No discrete esophageal varices seen  Mild circumferential bladder wall thickening without perivesical inflammatory changes, favored to represent underdistention as opposed to cystitis   If there is superpubic tenderness, urinalysis may be helpful  I personally discussed this study with Rizwana James on 12/30/2018 at 8:29 AM  Workstation performed: [id number]         Assessment & Plan  Principal Problem:    Abdominal pain  Active Problems:    ETOH abuse    PE (pulmonary thromboembolism) (HCC)    Tobacco abuse    Hypoalbuminemia    Hypertension    Melena    Alcoholic cirrhosis ([address] )    Ascites    Hypokalemia    He is a 62-year-old male with alcohol cirrhosis child's class C which is decompensated with alcoholic hepatitis, Clostridium difficile infection, and spontaneous bacterial peritonitis  1  He will continue on the ceftriaxone IV antibiotics until at least tomorrow for treatment of SBP  He is getting albumin replacement per protocol  I do not recommend starting diuretics for his ascites at present  Low-salt diet  He will require chronic SBP prophylaxis after discharge  2 continue vancomycin course and probiotic for C diff infection    He will need to remain on this for 10 days; continue isolation precautions  3 alcohol cessation; monitor and treat alcohol withdrawal; multivitamin thiamine and folate  4 he is a patient of Dr Walker Vazquez MD  1/1/2019,8:23 AM

## 2019-01-01 NOTE — PHYSICIAN ADVISOR
Current patient class: Inpatient  The patient is currently on Hospital Day: 2 at [address]      The patient was admitted to the hospital at [phone] on 12/30/18 for the following diagnosis:  Spontaneous bacterial peritonitis ([facility] [address] ) [K65 2]  Alcohol abuse [F10 10]  Abdominal distension [R14 0]  Cirrhosis ([facility] [address] ) [K74 60]  Abdominal pain [R10 9]  Nausea & vomiting [R11 2]  Ascites [R18 8]       There is documentation in the medical record of an expected length of stay of at least 2 midnights  The patient is therefore expected to satisfy the 2 midnight benchmark and given the 2 midnight presumption is appropriate for INPATIENT ADMISSION  Given this expectation of a satisfying stay, CMS instructs us that the patient is most often appropriate for inpatient admission under part A provided medical necessity is documented in the chart  After review of the relevant documentation, labs, vital signs and test results, the patient is appropriate for INPATIENT ADMISSION  Admission to the hospital as an inpatient is a complex decision making process which requires the practitioner to consider the patients presenting complaint, history and physical examination and all relevant testing  With this in mind, in this case, the patient was deemed appropriate for INPATIENT ADMISSION  After review of the documentation and testing available at the time of the admission I concur with this clinical determination of medical necessity  Rationale is as follows: The patient is a 46 yrs old Male who presented to the ED at 12/30/2018  1:32 AM with a chief complaint of Abdominal Pain (Pt states that he recently went on a drinking binge this week after being sober for a couple months  Pt c/o abdominal distention, pain, N/V  )     Given the need for further hospitalization, and along with the documentation of medical necessity present in the chart, the patient is appropriate for inpatient admission    The patient is expected to satisfy the 2 midnight benchmark, and will require further acute medical care  The patient does have comorbid conditions which increases the risk for significant adverse outcome  Given this the patient is appropriate for inpatient admission  The patients vitals on arrival were ED Triage Vitals [12/30/18 0138]   Temperature Pulse Respirations Blood Pressure SpO2   98 1 °F (36 7 °C) (!) 106 20 144/82 97 %      Temp Source Heart Rate Source Patient Position - Orthostatic VS BP Location FiO2 (%)   Oral Monitor Lying Right arm --      Pain Score       1           Past Medical History:   Diagnosis Date    ETOH abuse     Liver cirrhosis ([facility] [address] )      Past Surgical History:   Procedure Laterality Date    COLONOSCOPY N/A 7/3/2017    Procedure: COLONOSCOPY;  Surgeon: Misha Paul MD;  Location: [facility]; Service: Gastroenterology    ESOPHAGOGASTRODUODENOSCOPY N/A 6/25/2017    Procedure: ESOPHAGOGASTRODUODENOSCOPY (EGD); Surgeon: Marianela Chin MD;  Location: [facility]; Service: Gastroenterology           Consults have been placed to:   IP CONSULT TO GASTROENTEROLOGY    Vitals:    12/31/18 1249 12/31/18 1313 12/31/18 1438 12/31/18 1500   BP: 120/70   122/71   BP Location: Right arm      Pulse: 91   91   Resp: 18   18   Temp: 98 7 °F (37 1 °C)   98 4 °F (36 9 °C)   TempSrc: Oral   Oral   SpO2: 94% 95% 94% 94%   Weight:       Height:           Most recent labs:    Recent Labs      12/30/18   0211   12/31/18   0708  12/31/18   1130   WBC  7 69   --   6 80   --    HGB  13 4   --   10 9*   --    HCT  38 8   --   31 6*   --    PLT  141*   --   76*   --    K  3 5   --   2 8*   --    CALCIUM  9 2   --   8 8   --    BUN  17   --   13   --    CREATININE  0 85   --   0 76   --    LIPASE  125   --    --    --    INR   --    < >   --   1 40*   AST  69*   --   48*   --    ALT  27   --   18   --    ALKPHOS  297*   --   221*   --     < > = values in this interval not displayed         Scheduled Meds:  Current Facility-Administered Medications:  [START ON 1/2/2019] albumin human 25 g Intravenous TID Kelly Rosas PA-C    albumin human 50 g Intravenous BID Lilia Cheadle, PA-C    [START ON 1/1/2019] cefTRIAXone 1,000 mg Intravenous Q24H Kelly Villavicencio PA-C    nicotine 1 patch Transdermal Daily Geetha Avila MD    ondansetron 4 mg Intravenous Q6H PRN Geetha Avila MD    pantoprazole 40 mg Intravenous Q12H Cherylene Sciara, MD    potassium chloride 20 mEq Intravenous Q2H Jamila Reyes MD Last Rate: 20 mEq (12/31/18 1811)   saccharomyces boulardii 250 mg Oral BID Gavin Hodge III, MD    vancomycin 125 mg Oral Q6H [address] Kelly Rosas PA-C      Continuous Infusions:   PRN Meds: ondansetron    Surgical procedures (if appropriate):

## 2019-01-02 NOTE — DISCHARGE INSTRUCTIONS
Need to follow up with GI as soon as possible  Continue oral vancomycin for 10 days for the c  Diff infection  Continue the oral cipro 500 mg twice a day for SBP    Clostridium Difficile Infection   WHAT YOU NEED TO KNOW:   What is a Clostridium difficile infection? Clostridium difficile infection, or C  difficile, is an infection in your colon caused by bacteria  Different types of bacteria live inside the colon, creating a healthy balance between good and bad bacteria  If the C  difficile bacteria grow rapidly, this can disrupt the healthy balance of the colon  This can cause the lining of the colon to swell, which leads to an infection  How does a Clostridium difficile infection spread? The bowel movement of a person with a C  difficile infection contains C  difficile bacteria  Infected people who do not wash their hands after having a bowel movement can spread the infection  C  difficile bacteria may also be on surfaces, such as the tops of tables  The bacteria are often spread in a healthcare setting, such as a hospital    What increases my risk for a Clostridium difficile infection? · Antibiotics:  Antibiotic medicine kills both good and bad bacteria, which may upset the normal balance of bacteria in the colon  Your risk of getting C  difficile infection increases the longer you take antibiotic medicine  Your risk also increases if you take more than 1 type of antibiotic  · Hospital stay:  A long hospital stay, or sharing a room with a C  difficile-infected patient, increases your risk  · Age:  Older adults may get infections more easily than younger people because of body changes that occur with age  · Medical conditions:  A weak immune system caused by medicines, such as chemotherapy or steroids, can increase your risk for C  difficile  Major surgery, such as an organ transplant, can weaken your immune system  Your risk is increased if you have inflammatory bowel disease      · Dormant infection:  Inactive C  difficile bacteria may be in your body from a previous infection  This bacteria can cause a new infection  · Antacids:  Antacid medicine decreases stomach acid  Stomach acid normally works to kill harmful bacteria  This can upset the balance of bacteria in your colon and increase your risk of C  difficile infection  What are the signs and symptoms of a Clostridium difficile infection? Diarrhea is the most common symptom of C  difficile infection  You may have bad-smelling diarrhea many times a day  You may see blood, mucus, or pus in your bowel movements  You may also have any of the following:  · Fever or cramping pain in your abdomen    · Nausea or vomiting    · Dehydration (loss of too much body fluid)  How is a Clostridium difficile infection diagnosed? · Bowel movement tests:  A sample of your bowel movement is sent to a lab for testing  This test may show what kind of bacteria is causing your illness, and helps healthcare providers know what treatment is best for you  · Blood tests:  Blood tests can show signs of an infection  The blood may be taken from a blood vessel in your hand, arm, or the bend in your elbow  · Colonoscopy or sigmoidoscopy:  A scope is a long, bendable tube with a light on the end  The scope may also have a camera on it  During a colonoscopy or sigmoidoscopy, the scope is put into your anus and moved forward into your large colon  Healthcare providers look for problems, take pictures, and collect samples that are sent to the lab for tests  · CT scan: An x-ray machine uses a computer to take pictures of your colon  Before taking the pictures, you may be given dye through an IV  The dye helps your colon show up better in the pictures  People who are allergic to shellfish (lobster, crab, or shrimp) may be allergic to this dye  Tell your healthcare provider if you are allergic to any of these  How is a Clostridium difficile infection treated?   The goal of treatment is to restore the healthy balance of bacteria to your colon  This should help stop your diarrhea  You may need the following:  · Antibiotics:  Antibiotics help treat or prevent an infection caused by bacteria  If antibiotics caused your C  difficile infection, you may need to stop taking them and switch to a different antibiotic  Ask your healthcare provider for more information  · Oral rehydration therapy:  You will need to drink plenty of liquids to avoid dehydration  You may also drink an oral rehydration solution (ORS)  An ORS has the right amounts of water, salts, and sugar needed to replace body fluids  Ask your healthcare provider where to buy ORS and how much to drink  · Immune globulin medicine: You may need this to help your immune system if you have a severe C  difficile infection  · Surgery: If your C  difficile infection is severe or has damaged your colon, you may need surgery called colectomy  During surgery, part of your colon is removed  How can I manage my Clostridium difficile infection? · Wash your hands:  Wash your hands often with germ-killing soap and warm, running water  Alcohol-based hand rubs do not kill C  difficile bacteria  Always wash your hands well after you use the toilet, diaper a child, and before you prepare or serve food  Tell anyone who touches you to wear gloves and wash their hands  · Clean surfaces with bleach:  Clean tabletops, desks, and other surfaces before anyone else touches or uses them  Clean with chlorine-based disinfectants, such as household bleach  · Avoid the spread of C  difficile:  Do not share any items with other people  Use as many disposable items, such as paper plates, as you can  Do this until your diarrhea has gone away  When should I contact my healthcare provider? · You have a fever  · Your diarrhea is getting worse  · Your signs and symptoms get worse       · Your signs and symptoms do not go away, or they come back, even after treatment  · You cannot eat or drink  · You have questions or concerns about your condition or care  When should I seek immediate help or call 911? · You have a fever and stomach cramps that get worse, or do not go away  · Your abdomen is hard or feels swollen  · You have black or bright red stools  · You vomit blood  · You are short of breath, or feel like you are going to faint  · You have 1 or more of the following signs of dehydration:     ¨ Dizziness or weakness, or extreme sleepiness  ¨ Dry mouth, cracked lips, or you feel very thirsty    ¨ Fast heartbeat or rapid breathing    ¨ Very little urine or no urine    ¨ Sunken eyes     ¨ A child may be more irritable or fussy than usual  The soft spot on a baby's head may look sunken in  CARE AGREEMENT:   You have the right to help plan your care  Learn about your health condition and how it may be treated  Discuss treatment options with your caregivers to decide what care you want to receive  You always have the right to refuse treatment  The above information is an  only  It is not intended as medical advice for individual conditions or treatments  Talk to your doctor, nurse or pharmacist before following any medical regimen to see if it is safe and effective for you  © 2017 [employer] Information is for End User's use only and may not be sold, redistributed or otherwise used for commercial purposes  All illustrations and images included in CareNotes® are the copyrighted property of [employer]  or Kenrick [employer]

## 2019-01-02 NOTE — ASSESSMENT & PLAN NOTE
· Noted on paracentesis fluid analysis  · GI following,  · Was maintained on IV ceftriaxone, it was recommended patient continue IV abx until at least Wednesday (1/2) for treatment, however patient signed out AMA   He was given a prescription for PO ciprofloxacin for a total of 10 day course  · Pt will need to follow up with his primary GI, Dr Audelia Simmonds to obtain chronic SBP prophylaxis  · Did not receive additional IV albumin due to leaving the hospital [facility]

## 2019-01-02 NOTE — ASSESSMENT & PLAN NOTE
· Presented with alcohol level at 177 on 12/30  · CIWA scores have been low consistently  · Encouraged ETOH cessation   · Should continue thiamine and folic acid supplementation at home

## 2019-01-02 NOTE — ASSESSMENT & PLAN NOTE
· (+) C   Diff PCR  · Pt being treated with PO vancomycin  · Was given prescription to complete an additional 10 days (2 days in the hospital)  · Pt advised of good hand hygiene to prevent spread of infection   · Will need close follow up with GI

## 2019-01-02 NOTE — ASSESSMENT & PLAN NOTE
· Severe, last K+ level 2 7  · Was given repletion today  · Advised patient of risks of leaving the hospital AMA with this potassium level  · Will need to follow up with BMP as soon as possible

## 2019-01-02 NOTE — DISCHARGE SUMMARY
Discharge- Alvia Dance 1965, 46 y o  male MRN: [id number]    Unit/Bed#: -01 Encounter: [id number]    Primary Care Provider: No primary care provider on file  Date and time admitted to hospital: 12/30/2018  1:32 AM       DOS: 1/1/2019      * SBP [facility]ontaneous [facility]) [facility]   Assessment & Plan    · Noted on paracentesis fluid analysis  · GI following,  · Was maintained on IV ceftriaxone, it was recommended patient continue IV abx until at least Wednesday (1/2) for treatment, however patient signed out AMA  He was given a prescription for PO ciprofloxacin for a total of 10 day course  · Pt will need to follow up with his primary GI, Dr Jumnaa Neal to obtain chronic SBP prophylaxis  · Did not receive additional IV albumin due to leaving the hospital AMA     Clostridium difficile colitis   Assessment & Plan    · (+) C   Diff PCR  · Pt being treated with PO vancomycin  · Was given prescription to complete an additional 10 days (2 days in the hospital)  · Pt advised of good hand hygiene to prevent spread of infection   · Will need close follow up with GI     Hypokalemia   Assessment & Plan    · Severe, last K+ level 2 7  · Was given repletion today  · Advised patient of risks of leaving the hospital AMA with this potassium level  · Will need to follow up with BMP as soon as possible     Alcoholic cirrhosis ([place] [id number] )   Assessment & Plan    · Without encephalopathy, ammonia level normal  · Will need outpatient follow up with GI to evaluate for esophageal varices  · Pt continues to drink, strongly encourage cessation  · Pt left the hospital AMA, he is to follow up with his primary GI, Dr Jumana Neal as soon as possible      Tobacco abuse   Assessment & Plan    · Encouraged cessation  · Nicotine patch was given while inpatient     ETOH abuse   Assessment & Plan    · Presented with alcohol level at 177 on 12/30  · CIWA scores have been low consistently  · Encouraged ETOH cessation   · Should continue thiamine and folic acid supplementation at home       Discharging Physician / Practitioner: Miladis Harper PA-C  PCP: No primary care provider on file  Admission Date:   Admission Orders     Ordered        12/30/18 0909  Inpatient Admission (expected length of stay for this patient is greater than two midnights)  Once             Discharge Date: 01/01/19    Resolved Problems  Date Reviewed: 1/1/2019    None          Consultations During Hospital Stay:  · GI    Procedures Performed:     · CT abdomen pelvis 12/30-cirrhosis on sequela per hypertension evidence by recannulized umbilical vein and abdominal ascites  There is some a regular enhancement of the right peritoneal reflection which SBP cannot be excluded  Heterogeneous attenuation of the liver, favored to represent patchy fatty infiltration given the extensive fatty liver change on the previous study  Diffuse wall thickening of small and large bowel which could reflect infectious or inflammatory enterocolitis versus portal hypertensive enteric colopathy  No evidence of bowel obstruction  Thickening of the distal esophagus which may in part reflect hiatal hernia and oral med of esophagitis  Mild circumferential bladder wall thickening without perivesicular inflammatory changes, favored to represent underdistention as opposed cystitis  · IR paracentesis 12/31-evidence of SBP    Significant Findings / Test Results:     · Potassium 2 7 on discharge, patient signed out AMA  · Lactic acid improved from 2 5-1 7  · Platelets 67 on discharge, patient left AMA after risks described extensively to him  · C diff toxin positive  · Hepatitis-B panel negative  · Medical alcohol level 12/30 - 177  · AFP 8 3  · Procalcitonin negative  · Blood cultures negative times 48 hours    Incidental Findings:   · None    Test Results Pending at Discharge (will require follow up):    · Final blood cultures     Outpatient Tests Requested:  · Patient is follow up with his GI doctor as soon as possible    Complications:  SBP/c  diff    Reason for Admission: Abdominal pain    Hospital Course:     Oc Birmingham is a 46 y o  male patient with significant past medical history of alcoholic cirrhosis, history of PE not on chronic anticoagulation who originally presented to the hospital on 12/30/2018 due to abdominal pain  Patient had reported binge drinking and had nausea, vomiting and abdominal pain  There was evidence on CT scan of colitis and therefore C diff PCR was sent and patient was placed on IV ceftriaxone for SBP prophylaxis  Patient with significant ascites and underwent paracentesis on 12/31  Fluid analysis suspicious for SBP  Patient was to continue IV ceftriaxone until at least 1/2/2018  Also given IV albumin  Patient's C diff PCR came back positive and was placed on p o  Vancomycin with improvement of symptoms  Patient did not undergo any significant withdrawal symptoms while hospitalized  CIWA scores remained low  Patient noted to have thrombocytopenia and therefore heparin was discontinued  Patient was being treated for SBP and C diff  Patient requesting to leave the hospital AMA on 01/01  Risks of signing out against medical advice were discussed extensively with patient at bedside  Patient reports that he does not want stay in the hospital because he read online that healing needs to be treated with IV antibiotics for 3 days and that he was to see his GI doctor instead  Patient was alert and oriented, had capacity to make his own decisions  AMA form was signed at bedside  Medication changes include: Addition of 7 days worth of Cipro for SBP and 10 additional days of p  O  Vancomycin to complete course for C diff infection  He was advised to follow up with his GI doctor soon as possible and to obtain blood work  Please see above list of diagnoses and related plan for additional information       Condition at Discharge: poor     Discharge Day Visit / Exam:     * Please refer to separate progress note for these details *    Discussion with Family:  Discussed with patient at bedside  Extensively discussed risks of leaving the hospital, patient verbalized understanding and agreed to sign AMA paperwork    Discharge instructions/Information to patient and family:   See after visit summary for information provided to patient and family  Provisions for Follow-Up Care:  See after visit summary for information related to follow-up care and any pertinent home health orders  Disposition:     Home, pt left AMA    For Discharges to [facility] SNF:   · Not Applicable to this Patient - Not Applicable to this Patient    Planned Readmission: None, however likely as patient left AMA     Discharge Statement:  I spent 40 minutes discharging the patient  This time was spent on the day of discharge  I had direct contact with the patient on the day of discharge  Greater than 50% of the total time was spent examining patient, answering all patient questions, arranging and discussing plan of care with patient as well as directly providing post-discharge instructions  Additional time then spent on discharge activities  Discharge Medications:  See after visit summary for reconciled discharge medications provided to patient and family        ** Please Note: This note has been constructed using a voice recognition system **

## 2019-01-02 NOTE — ASSESSMENT & PLAN NOTE
· Without encephalopathy, ammonia level normal  · Will need outpatient follow up with GI to evaluate for esophageal varices  · Pt continues to drink, strongly encourage cessation  · Pt left the hospital AMA, he is to follow up with his primary GI, Dr Janna Cleveland as soon as possible

## 2019-01-03 LAB
BACTERIA SPEC BFLD CULT: NO GROWTH
GRAM STN SPEC: NORMAL
GRAM STN SPEC: NORMAL

## 2019-01-04 LAB
BACTERIA BLD CULT: NORMAL
BACTERIA BLD CULT: NORMAL

## 2024-01-01 ENCOUNTER — APPOINTMENT (INPATIENT)
Dept: ULTRASOUND IMAGING | Facility: HOSPITAL | Age: 59
DRG: 280 | End: 2024-01-01
Payer: COMMERCIAL

## 2024-01-01 ENCOUNTER — APPOINTMENT (INPATIENT)
Dept: GASTROENTEROLOGY | Facility: HOSPITAL | Age: 59
DRG: 280 | End: 2024-01-01
Payer: COMMERCIAL

## 2024-01-01 ENCOUNTER — APPOINTMENT (INPATIENT)
Dept: NON INVASIVE DIAGNOSTICS | Facility: HOSPITAL | Age: 59
DRG: 280 | End: 2024-01-01
Payer: COMMERCIAL

## 2024-01-01 ENCOUNTER — APPOINTMENT (INPATIENT)
Dept: RADIOLOGY | Facility: HOSPITAL | Age: 59
DRG: 280 | End: 2024-01-01
Payer: COMMERCIAL

## 2024-01-01 ENCOUNTER — ANESTHESIA EVENT (INPATIENT)
Dept: GASTROENTEROLOGY | Facility: HOSPITAL | Age: 59
DRG: 280 | End: 2024-01-01
Payer: COMMERCIAL

## 2024-01-01 ENCOUNTER — APPOINTMENT (EMERGENCY)
Dept: RADIOLOGY | Facility: HOSPITAL | Age: 59
DRG: 280 | End: 2024-01-01
Payer: COMMERCIAL

## 2024-01-01 ENCOUNTER — APPOINTMENT (EMERGENCY)
Dept: CT IMAGING | Facility: HOSPITAL | Age: 59
DRG: 280 | End: 2024-01-01
Payer: COMMERCIAL

## 2024-01-01 ENCOUNTER — APPOINTMENT (INPATIENT)
Dept: GASTROENTEROLOGY | Facility: HOSPITAL | Age: 59
DRG: 280 | End: 2024-01-01
Attending: INTERNAL MEDICINE
Payer: COMMERCIAL

## 2024-01-01 ENCOUNTER — HOSPITAL ENCOUNTER (INPATIENT)
Facility: HOSPITAL | Age: 59
LOS: 3 days | DRG: 280 | End: 2024-11-19
Attending: EMERGENCY MEDICINE | Admitting: INTERNAL MEDICINE
Payer: COMMERCIAL

## 2024-01-01 ENCOUNTER — ANESTHESIA (INPATIENT)
Dept: GASTROENTEROLOGY | Facility: HOSPITAL | Age: 59
DRG: 280 | End: 2024-01-01
Payer: COMMERCIAL

## 2024-01-01 VITALS
DIASTOLIC BLOOD PRESSURE: 59 MMHG | HEART RATE: 103 BPM | RESPIRATION RATE: 25 BRPM | TEMPERATURE: 99.7 F | HEIGHT: 72 IN | WEIGHT: 206.13 LBS | OXYGEN SATURATION: 89 % | BODY MASS INDEX: 27.92 KG/M2 | SYSTOLIC BLOOD PRESSURE: 119 MMHG

## 2024-01-01 DIAGNOSIS — K76.82 HEPATIC ENCEPHALOPATHY (HCC): ICD-10-CM

## 2024-01-01 DIAGNOSIS — D68.9 COAGULOPATHY (HCC): ICD-10-CM

## 2024-01-01 DIAGNOSIS — N17.9 AKI (ACUTE KIDNEY INJURY) (HCC): ICD-10-CM

## 2024-01-01 DIAGNOSIS — E87.20 LACTIC ACIDOSIS: ICD-10-CM

## 2024-01-01 DIAGNOSIS — K92.2 UPPER GI BLEED: ICD-10-CM

## 2024-01-01 DIAGNOSIS — T68.XXXA HYPOTHERMIA, INITIAL ENCOUNTER: ICD-10-CM

## 2024-01-01 DIAGNOSIS — D62 ABLA (ACUTE BLOOD LOSS ANEMIA): ICD-10-CM

## 2024-01-01 DIAGNOSIS — I85.01 BLEEDING ESOPHAGEAL VARICES, UNSPECIFIED ESOPHAGEAL VARICES TYPE (HCC): ICD-10-CM

## 2024-01-01 DIAGNOSIS — E87.20 METABOLIC ACIDOSIS: ICD-10-CM

## 2024-01-01 DIAGNOSIS — R57.8 HEMORRHAGIC SHOCK (HCC): Primary | ICD-10-CM

## 2024-01-01 DIAGNOSIS — D72.825 BANDEMIA: ICD-10-CM

## 2024-01-01 DIAGNOSIS — E80.6 HYPERBILIRUBINEMIA: ICD-10-CM

## 2024-01-01 DIAGNOSIS — G93.40 ACUTE ENCEPHALOPATHY: ICD-10-CM

## 2024-01-01 DIAGNOSIS — D72.829 LEUKOCYTOSIS: ICD-10-CM

## 2024-01-01 DIAGNOSIS — R74.01 TRANSAMINITIS: ICD-10-CM

## 2024-01-01 DIAGNOSIS — K76.6 PORTAL HYPERTENSION (HCC): ICD-10-CM

## 2024-01-01 DIAGNOSIS — D69.6 THROMBOCYTOPENIA (HCC): ICD-10-CM

## 2024-01-01 DIAGNOSIS — K70.31 ALCOHOLIC CIRRHOSIS OF LIVER WITH ASCITES (HCC): ICD-10-CM

## 2024-01-01 DIAGNOSIS — E16.2 HYPOGLYCEMIA: ICD-10-CM

## 2024-01-01 LAB
2HR DELTA HS TROPONIN: 21 NG/L
4HR DELTA HS TROPONIN: 46 NG/L
ABO GROUP BLD BPU: NORMAL
ABO GROUP BLD: NORMAL
ALBUMIN SERPL BCG-MCNC: 2.3 G/DL (ref 3.5–5)
ALBUMIN SERPL BCG-MCNC: 2.7 G/DL (ref 3.5–5)
ALBUMIN SERPL BCG-MCNC: 2.9 G/DL (ref 3.5–5)
ALBUMIN SERPL BCG-MCNC: 3 G/DL (ref 3.5–5)
ALBUMIN SERPL BCG-MCNC: 3 G/DL (ref 3.5–5)
ALBUMIN SERPL BCG-MCNC: 3.2 G/DL (ref 3.5–5)
ALBUMIN SERPL BCG-MCNC: 3.2 G/DL (ref 3.5–5)
ALBUMIN SERPL BCG-MCNC: 3.3 G/DL (ref 3.5–5)
ALBUMIN SERPL BCG-MCNC: 3.4 G/DL (ref 3.5–5)
ALBUMIN SERPL BCG-MCNC: 3.6 G/DL (ref 3.5–5)
ALP SERPL-CCNC: 141 U/L (ref 34–104)
ALP SERPL-CCNC: 150 U/L (ref 34–104)
ALP SERPL-CCNC: 222 U/L (ref 34–104)
ALP SERPL-CCNC: 240 U/L (ref 34–104)
ALP SERPL-CCNC: 242 U/L (ref 34–104)
ALP SERPL-CCNC: 254 U/L (ref 34–104)
ALP SERPL-CCNC: 255 U/L (ref 34–104)
ALP SERPL-CCNC: 268 U/L (ref 34–104)
ALP SERPL-CCNC: 273 U/L (ref 34–104)
ALP SERPL-CCNC: 304 U/L (ref 34–104)
ALT SERPL W P-5'-P-CCNC: 1027 U/L (ref 7–52)
ALT SERPL W P-5'-P-CCNC: 1049 U/L (ref 7–52)
ALT SERPL W P-5'-P-CCNC: 1127 U/L (ref 7–52)
ALT SERPL W P-5'-P-CCNC: 1171 U/L (ref 7–52)
ALT SERPL W P-5'-P-CCNC: 456 U/L (ref 7–52)
ALT SERPL W P-5'-P-CCNC: 483 U/L (ref 7–52)
ALT SERPL W P-5'-P-CCNC: 704 U/L (ref 7–52)
ALT SERPL W P-5'-P-CCNC: 904 U/L (ref 7–52)
ALT SERPL W P-5'-P-CCNC: 933 U/L (ref 7–52)
ALT SERPL W P-5'-P-CCNC: 937 U/L (ref 7–52)
AMMONIA PLAS-SCNC: 121 UMOL/L (ref 18–72)
AMMONIA PLAS-SCNC: 186 UMOL/L (ref 18–72)
AMMONIA PLAS-SCNC: 252 UMOL/L (ref 18–72)
AMMONIA PLAS-SCNC: 449 UMOL/L (ref 18–72)
AMMONIA PLAS-SCNC: 487 UMOL/L (ref 18–72)
AMPHETAMINES SERPL QL SCN: NEGATIVE
ANION GAP SERPL CALCULATED.3IONS-SCNC: 10 MMOL/L (ref 4–13)
ANION GAP SERPL CALCULATED.3IONS-SCNC: 13 MMOL/L (ref 4–13)
ANION GAP SERPL CALCULATED.3IONS-SCNC: 16 MMOL/L (ref 4–13)
ANION GAP SERPL CALCULATED.3IONS-SCNC: 17 MMOL/L (ref 4–13)
ANION GAP SERPL CALCULATED.3IONS-SCNC: 18 MMOL/L (ref 4–13)
ANION GAP SERPL CALCULATED.3IONS-SCNC: 19 MMOL/L (ref 4–13)
ANION GAP SERPL CALCULATED.3IONS-SCNC: 23 MMOL/L (ref 4–13)
ANION GAP SERPL CALCULATED.3IONS-SCNC: 30 MMOL/L (ref 4–13)
ANION GAP SERPL CALCULATED.3IONS-SCNC: 32 MMOL/L (ref 4–13)
ANION GAP SERPL CALCULATED.3IONS-SCNC: 7 MMOL/L (ref 4–13)
ANION GAP SERPL CALCULATED.3IONS-SCNC: 9 MMOL/L (ref 4–13)
ANISOCYTOSIS BLD QL SMEAR: PRESENT
AORTIC ROOT: 3.7 CM
APAP SERPL-MCNC: <2 UG/ML (ref 10–20)
APICAL FOUR CHAMBER EJECTION FRACTION: 67 %
APTT PPP: 40 SECONDS (ref 23–34)
APTT PPP: 42 SECONDS (ref 23–34)
ARTERIAL PATENCY WRIST A: NO
ARTERIAL PATENCY WRIST A: NO
ASCENDING AORTA: 3.7 CM
AST SERPL W P-5'-P-CCNC: 1168 U/L (ref 13–39)
AST SERPL W P-5'-P-CCNC: 1306 U/L (ref 13–39)
AST SERPL W P-5'-P-CCNC: 2410 U/L (ref 13–39)
AST SERPL W P-5'-P-CCNC: 4335 U/L (ref 13–39)
AST SERPL W P-5'-P-CCNC: 4388 U/L (ref 13–39)
AST SERPL W P-5'-P-CCNC: 5123 U/L (ref 13–39)
AST SERPL W P-5'-P-CCNC: 6014 U/L (ref 13–39)
AST SERPL W P-5'-P-CCNC: 6213 U/L (ref 13–39)
AST SERPL W P-5'-P-CCNC: 7470 U/L (ref 13–39)
AST SERPL W P-5'-P-CCNC: 7596 U/L (ref 13–39)
AV PEAK GRADIENT: 13 MMHG
BACTERIA UR QL AUTO: ABNORMAL /HPF
BARBITURATES UR QL: NEGATIVE
BASE EX.OXY STD BLDV CALC-SCNC: 84.2 % (ref 60–80)
BASE EX.OXY STD BLDV CALC-SCNC: 89.8 % (ref 60–80)
BASE EXCESS BLDA CALC-SCNC: -10.2 MMOL/L
BASE EXCESS BLDA CALC-SCNC: -16.8 MMOL/L
BASE EXCESS BLDA CALC-SCNC: -2.6 MMOL/L
BASE EXCESS BLDA CALC-SCNC: -22.7 MMOL/L
BASE EXCESS BLDA CALC-SCNC: 3.1 MMOL/L
BASE EXCESS BLDA CALC-SCNC: 3.4 MMOL/L
BASE EXCESS BLDV CALC-SCNC: -26.2 MMOL/L
BASE EXCESS BLDV CALC-SCNC: -8.1 MMOL/L
BASOPHILS # BLD MANUAL: 0 THOUSAND/UL (ref 0–0.1)
BASOPHILS # BLD MANUAL: 0 THOUSAND/UL (ref 0–0.1)
BASOPHILS NFR MAR MANUAL: 0 % (ref 0–1)
BASOPHILS NFR MAR MANUAL: 0 % (ref 0–1)
BENZODIAZ UR QL: NEGATIVE
BILIRUB DIRECT SERPL-MCNC: 2.78 MG/DL (ref 0–0.2)
BILIRUB SERPL-MCNC: 10.31 MG/DL (ref 0.2–1)
BILIRUB SERPL-MCNC: 4.13 MG/DL (ref 0.2–1)
BILIRUB SERPL-MCNC: 4.53 MG/DL (ref 0.2–1)
BILIRUB SERPL-MCNC: 4.84 MG/DL (ref 0.2–1)
BILIRUB SERPL-MCNC: 5.35 MG/DL (ref 0.2–1)
BILIRUB SERPL-MCNC: 5.89 MG/DL (ref 0.2–1)
BILIRUB SERPL-MCNC: 6.37 MG/DL (ref 0.2–1)
BILIRUB SERPL-MCNC: 6.47 MG/DL (ref 0.2–1)
BILIRUB SERPL-MCNC: 7.85 MG/DL (ref 0.2–1)
BILIRUB SERPL-MCNC: 9.74 MG/DL (ref 0.2–1)
BILIRUB UR QL STRIP: NEGATIVE
BLD GP AB SCN SERPL QL: NEGATIVE
BNP SERPL-MCNC: 263 PG/ML (ref 0–100)
BODY TEMPERATURE: 98.2 DEGREES FEHRENHEIT
BODY TEMPERATURE: 99.5 DEGREES FEHRENHEIT
BPU ID: NORMAL
BSA FOR ECHO PROCEDURE: 2.01 M2
BUN SERPL-MCNC: 28 MG/DL (ref 5–25)
BUN SERPL-MCNC: 29 MG/DL (ref 5–25)
BUN SERPL-MCNC: 29 MG/DL (ref 5–25)
BUN SERPL-MCNC: 30 MG/DL (ref 5–25)
BUN SERPL-MCNC: 31 MG/DL (ref 5–25)
BUN SERPL-MCNC: 34 MG/DL (ref 5–25)
BUN SERPL-MCNC: 37 MG/DL (ref 5–25)
BUN SERPL-MCNC: 44 MG/DL (ref 5–25)
BUN SERPL-MCNC: 51 MG/DL (ref 5–25)
BUN SERPL-MCNC: 54 MG/DL (ref 5–25)
BUN SERPL-MCNC: 56 MG/DL (ref 5–25)
BUN SERPL-MCNC: 58 MG/DL (ref 5–25)
BUN SERPL-MCNC: 58 MG/DL (ref 5–25)
BUN SERPL-MCNC: 66 MG/DL (ref 5–25)
BUN SERPL-MCNC: 69 MG/DL (ref 5–25)
CA-I BLD-SCNC: 0.92 MMOL/L (ref 1.12–1.32)
CA-I BLD-SCNC: 0.98 MMOL/L (ref 1.12–1.32)
CA-I BLD-SCNC: 0.99 MMOL/L (ref 1.12–1.32)
CA-I BLD-SCNC: 1.06 MMOL/L (ref 1.12–1.32)
CA-I BLD-SCNC: 1.07 MMOL/L (ref 1.12–1.32)
CA-I BLD-SCNC: 1.08 MMOL/L (ref 1.12–1.32)
CA-I BLD-SCNC: 1.09 MMOL/L (ref 1.12–1.32)
CA-I BLD-SCNC: 1.1 MMOL/L (ref 1.12–1.32)
CA-I BLD-SCNC: 1.11 MMOL/L (ref 1.12–1.32)
CA-I BLD-SCNC: 1.13 MMOL/L (ref 1.12–1.32)
CALCIUM ALBUM COR SERPL-MCNC: 8.7 MG/DL (ref 8.3–10.1)
CALCIUM ALBUM COR SERPL-MCNC: 9 MG/DL (ref 8.3–10.1)
CALCIUM ALBUM COR SERPL-MCNC: 9.2 MG/DL (ref 8.3–10.1)
CALCIUM ALBUM COR SERPL-MCNC: 9.6 MG/DL (ref 8.3–10.1)
CALCIUM ALBUM COR SERPL-MCNC: 9.6 MG/DL (ref 8.3–10.1)
CALCIUM ALBUM COR SERPL-MCNC: 9.7 MG/DL (ref 8.3–10.1)
CALCIUM ALBUM COR SERPL-MCNC: 9.8 MG/DL (ref 8.3–10.1)
CALCIUM ALBUM COR SERPL-MCNC: 9.9 MG/DL (ref 8.3–10.1)
CALCIUM SERPL-MCNC: 7.3 MG/DL (ref 8.4–10.2)
CALCIUM SERPL-MCNC: 8 MG/DL (ref 8.4–10.2)
CALCIUM SERPL-MCNC: 8.4 MG/DL (ref 8.4–10.2)
CALCIUM SERPL-MCNC: 8.5 MG/DL (ref 8.4–10.2)
CALCIUM SERPL-MCNC: 8.6 MG/DL (ref 8.4–10.2)
CALCIUM SERPL-MCNC: 8.8 MG/DL (ref 8.4–10.2)
CALCIUM SERPL-MCNC: 8.9 MG/DL (ref 8.4–10.2)
CALCIUM SERPL-MCNC: 8.9 MG/DL (ref 8.4–10.2)
CALCIUM SERPL-MCNC: 9 MG/DL (ref 8.4–10.2)
CALCIUM SERPL-MCNC: 9.1 MG/DL (ref 8.4–10.2)
CALCIUM SERPL-MCNC: 9.2 MG/DL (ref 8.4–10.2)
CALCIUM SERPL-MCNC: 9.3 MG/DL (ref 8.4–10.2)
CARDIAC TROPONIN I PNL SERPL HS: 298 NG/L (ref 8–18)
CARDIAC TROPONIN I PNL SERPL HS: 39 NG/L (ref ?–50)
CARDIAC TROPONIN I PNL SERPL HS: 511 NG/L (ref 8–18)
CARDIAC TROPONIN I PNL SERPL HS: 60 NG/L (ref ?–50)
CARDIAC TROPONIN I PNL SERPL HS: 85 NG/L (ref ?–50)
CHLORIDE SERPL-SCNC: 100 MMOL/L (ref 96–108)
CHLORIDE SERPL-SCNC: 101 MMOL/L (ref 96–108)
CHLORIDE SERPL-SCNC: 103 MMOL/L (ref 96–108)
CHLORIDE SERPL-SCNC: 103 MMOL/L (ref 96–108)
CHLORIDE SERPL-SCNC: 104 MMOL/L (ref 96–108)
CHLORIDE SERPL-SCNC: 105 MMOL/L (ref 96–108)
CHLORIDE SERPL-SCNC: 106 MMOL/L (ref 96–108)
CHLORIDE SERPL-SCNC: 106 MMOL/L (ref 96–108)
CHLORIDE SERPL-SCNC: 107 MMOL/L (ref 96–108)
CHLORIDE SERPL-SCNC: 108 MMOL/L (ref 96–108)
CHLORIDE SERPL-SCNC: 96 MMOL/L (ref 96–108)
CHLORIDE SERPL-SCNC: 96 MMOL/L (ref 96–108)
CHLORIDE SERPL-SCNC: 98 MMOL/L (ref 96–108)
CHLORIDE SERPL-SCNC: 98 MMOL/L (ref 96–108)
CHLORIDE SERPL-SCNC: 99 MMOL/L (ref 96–108)
CK SERPL-CCNC: 829 U/L (ref 39–308)
CLARITY UR: ABNORMAL
CO2 SERPL-SCNC: 10 MMOL/L (ref 21–32)
CO2 SERPL-SCNC: 11 MMOL/L (ref 21–32)
CO2 SERPL-SCNC: 18 MMOL/L (ref 21–32)
CO2 SERPL-SCNC: 20 MMOL/L (ref 21–32)
CO2 SERPL-SCNC: 21 MMOL/L (ref 21–32)
CO2 SERPL-SCNC: 22 MMOL/L (ref 21–32)
CO2 SERPL-SCNC: 22 MMOL/L (ref 21–32)
CO2 SERPL-SCNC: 23 MMOL/L (ref 21–32)
CO2 SERPL-SCNC: 24 MMOL/L (ref 21–32)
CO2 SERPL-SCNC: 25 MMOL/L (ref 21–32)
CO2 SERPL-SCNC: 25 MMOL/L (ref 21–32)
CO2 SERPL-SCNC: 29 MMOL/L (ref 21–32)
CO2 SERPL-SCNC: 30 MMOL/L (ref 21–32)
CO2 SERPL-SCNC: 32 MMOL/L (ref 21–32)
CO2 SERPL-SCNC: 34 MMOL/L (ref 21–32)
COCAINE UR QL: NEGATIVE
COLOR UR: YELLOW
CREAT SERPL-MCNC: 1.74 MG/DL (ref 0.6–1.3)
CREAT SERPL-MCNC: 1.77 MG/DL (ref 0.6–1.3)
CREAT SERPL-MCNC: 1.79 MG/DL (ref 0.6–1.3)
CREAT SERPL-MCNC: 1.9 MG/DL (ref 0.6–1.3)
CREAT SERPL-MCNC: 2.25 MG/DL (ref 0.6–1.3)
CREAT SERPL-MCNC: 2.54 MG/DL (ref 0.6–1.3)
CREAT SERPL-MCNC: 2.77 MG/DL (ref 0.6–1.3)
CREAT SERPL-MCNC: 3.49 MG/DL (ref 0.6–1.3)
CREAT SERPL-MCNC: 4.16 MG/DL (ref 0.6–1.3)
CREAT SERPL-MCNC: 4.55 MG/DL (ref 0.6–1.3)
CREAT SERPL-MCNC: 4.74 MG/DL (ref 0.6–1.3)
CREAT SERPL-MCNC: 5.01 MG/DL (ref 0.6–1.3)
CREAT SERPL-MCNC: 5.65 MG/DL (ref 0.6–1.3)
CREAT SERPL-MCNC: 6.14 MG/DL (ref 0.6–1.3)
CREAT SERPL-MCNC: 6.58 MG/DL (ref 0.6–1.3)
CROSSMATCH: NORMAL
DOHLE BOD BLD QL SMEAR: PRESENT
E WAVE DECELERATION TIME: 144 MS
E/A RATIO: 0.88
EOSINOPHIL # BLD MANUAL: 0 THOUSAND/UL (ref 0–0.4)
EOSINOPHIL # BLD MANUAL: 0 THOUSAND/UL (ref 0–0.4)
EOSINOPHIL NFR BLD MANUAL: 0 % (ref 0–6)
EOSINOPHIL NFR BLD MANUAL: 0 % (ref 0–6)
ERYTHROCYTE [DISTWIDTH] IN BLOOD BY AUTOMATED COUNT: 16 % (ref 11.6–15.1)
ERYTHROCYTE [DISTWIDTH] IN BLOOD BY AUTOMATED COUNT: 16.3 % (ref 11.6–15.1)
ERYTHROCYTE [DISTWIDTH] IN BLOOD BY AUTOMATED COUNT: 16.4 % (ref 11.6–15.1)
ERYTHROCYTE [DISTWIDTH] IN BLOOD BY AUTOMATED COUNT: 16.5 % (ref 11.6–15.1)
ERYTHROCYTE [DISTWIDTH] IN BLOOD BY AUTOMATED COUNT: 16.9 % (ref 11.6–15.1)
ERYTHROCYTE [DISTWIDTH] IN BLOOD BY AUTOMATED COUNT: 17.6 % (ref 11.6–15.1)
ERYTHROCYTE [DISTWIDTH] IN BLOOD BY AUTOMATED COUNT: 17.9 % (ref 11.6–15.1)
ERYTHROCYTE [DISTWIDTH] IN BLOOD BY AUTOMATED COUNT: 18.1 % (ref 11.6–15.1)
ERYTHROCYTE [DISTWIDTH] IN BLOOD BY AUTOMATED COUNT: 18.3 % (ref 11.6–15.1)
ERYTHROCYTE [DISTWIDTH] IN BLOOD BY AUTOMATED COUNT: 22.1 % (ref 11.6–15.1)
ETHANOL SERPL-MCNC: <10 MG/DL
FENTANYL UR QL SCN: NEGATIVE
FLUAV AG UPPER RESP QL IA.RAPID: NEGATIVE
FLUBV AG UPPER RESP QL IA.RAPID: NEGATIVE
FRACTIONAL SHORTENING: 48 (ref 28–44)
GFR SERPL CREATININE-BSD FRML MDRD: 10 ML/MIN/1.73SQ M
GFR SERPL CREATININE-BSD FRML MDRD: 11 ML/MIN/1.73SQ M
GFR SERPL CREATININE-BSD FRML MDRD: 12 ML/MIN/1.73SQ M
GFR SERPL CREATININE-BSD FRML MDRD: 13 ML/MIN/1.73SQ M
GFR SERPL CREATININE-BSD FRML MDRD: 14 ML/MIN/1.73SQ M
GFR SERPL CREATININE-BSD FRML MDRD: 17 ML/MIN/1.73SQ M
GFR SERPL CREATININE-BSD FRML MDRD: 23 ML/MIN/1.73SQ M
GFR SERPL CREATININE-BSD FRML MDRD: 26 ML/MIN/1.73SQ M
GFR SERPL CREATININE-BSD FRML MDRD: 30 ML/MIN/1.73SQ M
GFR SERPL CREATININE-BSD FRML MDRD: 37 ML/MIN/1.73SQ M
GFR SERPL CREATININE-BSD FRML MDRD: 40 ML/MIN/1.73SQ M
GFR SERPL CREATININE-BSD FRML MDRD: 40 ML/MIN/1.73SQ M
GFR SERPL CREATININE-BSD FRML MDRD: 41 ML/MIN/1.73SQ M
GFR SERPL CREATININE-BSD FRML MDRD: 8 ML/MIN/1.73SQ M
GFR SERPL CREATININE-BSD FRML MDRD: 9 ML/MIN/1.73SQ M
GLUCOSE SERPL-MCNC: 103 MG/DL (ref 65–140)
GLUCOSE SERPL-MCNC: 124 MG/DL (ref 65–140)
GLUCOSE SERPL-MCNC: 136 MG/DL (ref 65–140)
GLUCOSE SERPL-MCNC: 144 MG/DL (ref 65–140)
GLUCOSE SERPL-MCNC: 147 MG/DL (ref 65–140)
GLUCOSE SERPL-MCNC: 152 MG/DL (ref 65–140)
GLUCOSE SERPL-MCNC: 155 MG/DL (ref 65–140)
GLUCOSE SERPL-MCNC: 182 MG/DL (ref 65–140)
GLUCOSE SERPL-MCNC: 33 MG/DL (ref 65–140)
GLUCOSE SERPL-MCNC: 51 MG/DL (ref 65–140)
GLUCOSE SERPL-MCNC: 60 MG/DL (ref 65–140)
GLUCOSE SERPL-MCNC: 72 MG/DL (ref 65–140)
GLUCOSE SERPL-MCNC: 72 MG/DL (ref 65–140)
GLUCOSE SERPL-MCNC: 73 MG/DL (ref 65–140)
GLUCOSE SERPL-MCNC: 74 MG/DL (ref 65–140)
GLUCOSE SERPL-MCNC: 79 MG/DL (ref 65–140)
GLUCOSE SERPL-MCNC: 79 MG/DL (ref 65–140)
GLUCOSE SERPL-MCNC: 81 MG/DL (ref 65–140)
GLUCOSE SERPL-MCNC: 85 MG/DL (ref 65–140)
GLUCOSE SERPL-MCNC: 99 MG/DL (ref 65–140)
GLUCOSE UR STRIP-MCNC: NEGATIVE MG/DL
HCO3 BLDA-SCNC: 10.9 MMOL/L (ref 22–28)
HCO3 BLDA-SCNC: 15.8 MMOL/L (ref 22–28)
HCO3 BLDA-SCNC: 20.5 MMOL/L (ref 22–28)
HCO3 BLDA-SCNC: 26.2 MMOL/L (ref 22–28)
HCO3 BLDA-SCNC: 26.4 MMOL/L (ref 22–28)
HCO3 BLDA-SCNC: 4.6 MMOL/L (ref 22–28)
HCO3 BLDV-SCNC: 17.3 MMOL/L (ref 24–30)
HCO3 BLDV-SCNC: 3.5 MMOL/L (ref 24–30)
HCT VFR BLD AUTO: 19.5 % (ref 36.5–49.3)
HCT VFR BLD AUTO: 24 % (ref 36.5–49.3)
HCT VFR BLD AUTO: 30.3 % (ref 36.5–49.3)
HCT VFR BLD AUTO: 30.5 % (ref 36.5–49.3)
HCT VFR BLD AUTO: 31.4 % (ref 36.5–49.3)
HCT VFR BLD AUTO: 31.5 % (ref 36.5–49.3)
HCT VFR BLD AUTO: 31.6 % (ref 36.5–49.3)
HCT VFR BLD AUTO: 31.7 % (ref 36.5–49.3)
HCT VFR BLD AUTO: 31.9 % (ref 36.5–49.3)
HCT VFR BLD AUTO: 34 % (ref 36.5–49.3)
HGB BLD-MCNC: 10 G/DL (ref 12–17)
HGB BLD-MCNC: 10 G/DL (ref 12–17)
HGB BLD-MCNC: 10.3 G/DL (ref 12–17)
HGB BLD-MCNC: 4.7 G/DL (ref 12–17)
HGB BLD-MCNC: 6.8 G/DL (ref 12–17)
HGB BLD-MCNC: 9.3 G/DL (ref 12–17)
HGB BLD-MCNC: 9.9 G/DL (ref 12–17)
HGB BLD-MCNC: 9.9 G/DL (ref 12–17)
HGB UR QL STRIP.AUTO: ABNORMAL
HOROWITZ INDEX BLDA+IHG-RTO: 100 MM[HG]
HOROWITZ INDEX BLDA+IHG-RTO: 80 MM[HG]
HOROWITZ INDEX BLDA+IHG-RTO: 80 MM[HG]
HYDROCODONE UR QL SCN: NEGATIVE
HYPERCHROMIA BLD QL SMEAR: PRESENT
INR PPP: 1.94 (ref 0.85–1.19)
INR PPP: 2.61 (ref 0.85–1.19)
INR PPP: 2.65 (ref 0.85–1.19)
INR PPP: 3.24 (ref 0.85–1.19)
INR PPP: 3.59 (ref 0.85–1.19)
INR PPP: 3.69 (ref 0.85–1.19)
INTERVENTRICULAR SEPTUM IN DIASTOLE (PARASTERNAL SHORT AXIS VIEW): 1.3 CM
INTERVENTRICULAR SEPTUM: 1.3 CM (ref 0.6–1.1)
KETONES UR STRIP-MCNC: ABNORMAL MG/DL
LAAS-AP2: 24.4 CM2
LAAS-AP4: 22.9 CM2
LACTATE SERPL-SCNC: 13.9 MMOL/L (ref 0.5–2)
LACTATE SERPL-SCNC: 16.8 MMOL/L (ref 0.5–2)
LACTATE SERPL-SCNC: 23.4 MMOL/L (ref 0.5–2)
LACTATE SERPL-SCNC: 25.2 MMOL/L (ref 0.5–2)
LACTATE SERPL-SCNC: 3 MMOL/L (ref 0.5–2)
LACTATE SERPL-SCNC: 3.1 MMOL/L (ref 0.5–2)
LACTATE SERPL-SCNC: 4.2 MMOL/L (ref 0.5–2)
LACTATE SERPL-SCNC: 5 MMOL/L (ref 0.5–2)
LACTATE SERPL-SCNC: 5 MMOL/L (ref 0.5–2)
LACTATE SERPL-SCNC: 5.6 MMOL/L (ref 0.5–2)
LACTATE SERPL-SCNC: 5.9 MMOL/L (ref 0.5–2)
LACTATE SERPL-SCNC: 6 MMOL/L (ref 0.5–2)
LACTATE SERPL-SCNC: 9.1 MMOL/L (ref 0.5–2)
LDH SERPL-CCNC: 4945 U/L (ref 140–271)
LEFT ATRIUM SIZE: 3.8 CM
LEFT ATRIUM VOLUME (MOD BIPLANE): 79 ML
LEFT ATRIUM VOLUME INDEX (MOD BIPLANE): 39.3 ML/M2
LEFT INTERNAL DIMENSION IN SYSTOLE: 2.6 CM (ref 2.1–4)
LEFT VENTRICULAR INTERNAL DIMENSION IN DIASTOLE: 5 CM (ref 3.5–6)
LEFT VENTRICULAR POSTERIOR WALL IN END DIASTOLE: 1.1 CM
LEFT VENTRICULAR STROKE VOLUME: 94 ML
LEUKOCYTE ESTERASE UR QL STRIP: NEGATIVE
LVSV (TEICH): 94 ML
LYMPHOCYTES # BLD AUTO: 0.57 THOUSAND/UL (ref 0.6–4.47)
LYMPHOCYTES # BLD AUTO: 1.34 THOUSAND/UL (ref 0.6–4.47)
LYMPHOCYTES # BLD AUTO: 3 % (ref 14–44)
LYMPHOCYTES # BLD AUTO: 6 % (ref 14–44)
MAGNESIUM SERPL-MCNC: 1.5 MG/DL (ref 1.9–2.7)
MAGNESIUM SERPL-MCNC: 1.6 MG/DL (ref 1.9–2.7)
MAGNESIUM SERPL-MCNC: 1.7 MG/DL (ref 1.9–2.7)
MAGNESIUM SERPL-MCNC: 2 MG/DL (ref 1.9–2.7)
MAGNESIUM SERPL-MCNC: 2.1 MG/DL (ref 1.9–2.7)
MAGNESIUM SERPL-MCNC: 2.4 MG/DL (ref 1.9–2.7)
MCH RBC QN AUTO: 23.7 PG (ref 26.8–34.3)
MCH RBC QN AUTO: 27.1 PG (ref 26.8–34.3)
MCH RBC QN AUTO: 27.4 PG (ref 26.8–34.3)
MCH RBC QN AUTO: 27.5 PG (ref 26.8–34.3)
MCH RBC QN AUTO: 27.7 PG (ref 26.8–34.3)
MCH RBC QN AUTO: 27.8 PG (ref 26.8–34.3)
MCH RBC QN AUTO: 28 PG (ref 26.8–34.3)
MCH RBC QN AUTO: 28.1 PG (ref 26.8–34.3)
MCHC RBC AUTO-ENTMCNC: 24.1 G/DL (ref 31.4–37.4)
MCHC RBC AUTO-ENTMCNC: 28.3 G/DL (ref 31.4–37.4)
MCHC RBC AUTO-ENTMCNC: 30.3 G/DL (ref 31.4–37.4)
MCHC RBC AUTO-ENTMCNC: 30.7 G/DL (ref 31.4–37.4)
MCHC RBC AUTO-ENTMCNC: 31.2 G/DL (ref 31.4–37.4)
MCHC RBC AUTO-ENTMCNC: 31.3 G/DL (ref 31.4–37.4)
MCHC RBC AUTO-ENTMCNC: 31.6 G/DL (ref 31.4–37.4)
MCHC RBC AUTO-ENTMCNC: 32.5 G/DL (ref 31.4–37.4)
MCHC RBC AUTO-ENTMCNC: 32.7 G/DL (ref 31.4–37.4)
MCHC RBC AUTO-ENTMCNC: 32.8 G/DL (ref 31.4–37.4)
MCV RBC AUTO: 84 FL (ref 82–98)
MCV RBC AUTO: 85 FL (ref 82–98)
MCV RBC AUTO: 85 FL (ref 82–98)
MCV RBC AUTO: 88 FL (ref 82–98)
MCV RBC AUTO: 89 FL (ref 82–98)
MCV RBC AUTO: 89 FL (ref 82–98)
MCV RBC AUTO: 90 FL (ref 82–98)
MCV RBC AUTO: 91 FL (ref 82–98)
MCV RBC AUTO: 96 FL (ref 82–98)
MCV RBC AUTO: 99 FL (ref 82–98)
METAMYELOCYTE ABSOLUTE CT: 0.45 THOUSAND/UL (ref 0–0.1)
METAMYELOCYTE ABSOLUTE CT: 0.96 THOUSAND/UL (ref 0–0.1)
METAMYELOCYTES NFR BLD MANUAL: 2 % (ref 0–1)
METAMYELOCYTES NFR BLD MANUAL: 5 % (ref 0–1)
METHADONE UR QL: NEGATIVE
MONOCYTES # BLD AUTO: 0.77 THOUSAND/UL (ref 0–1.22)
MONOCYTES # BLD AUTO: 1.56 THOUSAND/UL (ref 0–1.22)
MONOCYTES NFR BLD: 4 % (ref 4–12)
MONOCYTES NFR BLD: 7 % (ref 4–12)
MUCOUS THREADS UR QL AUTO: ABNORMAL
MV E'TISSUE VEL-SEP: 12 CM/S
MV PEAK A VEL: 0.81 M/S
MV PEAK E VEL: 71 CM/S
MV STENOSIS PRESSURE HALF TIME: 42 MS
MV VALVE AREA P 1/2 METHOD: 5.24
NEUTROPHILS # BLD MANUAL: 16.84 THOUSAND/UL (ref 1.85–7.62)
NEUTROPHILS # BLD MANUAL: 18.92 THOUSAND/UL (ref 1.85–7.62)
NEUTS BAND NFR BLD MANUAL: 20 % (ref 0–8)
NEUTS BAND NFR BLD MANUAL: 23 % (ref 0–8)
NEUTS SEG NFR BLD AUTO: 65 % (ref 43–75)
NEUTS SEG NFR BLD AUTO: 65 % (ref 43–75)
NITRITE UR QL STRIP: NEGATIVE
NON-SQ EPI CELLS URNS QL MICRO: ABNORMAL /HPF
NRBC BLD AUTO-RTO: 0 /100 WBCS
NRBC BLD AUTO-RTO: 1 /100 WBC (ref 0–2)
O2 CT BLDA-SCNC: 10.2 ML/DL (ref 16–23)
O2 CT BLDA-SCNC: 14.4 ML/DL (ref 16–23)
O2 CT BLDA-SCNC: 14.5 ML/DL (ref 16–23)
O2 CT BLDA-SCNC: 14.7 ML/DL (ref 16–23)
O2 CT BLDA-SCNC: 15 ML/DL (ref 16–23)
O2 CT BLDA-SCNC: 15.4 ML/DL (ref 16–23)
O2 CT BLDV-SCNC: 12.6 ML/DL
O2 CT BLDV-SCNC: 7.3 ML/DL
OPIATES UR QL SCN: NEGATIVE
OXYCODONE+OXYMORPHONE UR QL SCN: NEGATIVE
OXYHGB MFR BLDA: 91.8 % (ref 94–97)
OXYHGB MFR BLDA: 93.7 % (ref 94–97)
OXYHGB MFR BLDA: 95.1 % (ref 94–97)
OXYHGB MFR BLDA: 95.4 % (ref 94–97)
OXYHGB MFR BLDA: 96.2 % (ref 94–97)
OXYHGB MFR BLDA: 96.6 % (ref 94–97)
PCO2 BLDA: 14.5 MM HG (ref 36–44)
PCO2 BLDA: 30 MM HG (ref 36–44)
PCO2 BLDA: 32.5 MM HG (ref 36–44)
PCO2 BLDA: 34.4 MM HG (ref 36–44)
PCO2 BLDA: 34.6 MM HG (ref 36–44)
PCO2 BLDA: 35.1 MM HG (ref 36–44)
PCO2 BLDV: 16.9 MM HG (ref 42–50)
PCO2 BLDV: 35.2 MM HG (ref 42–50)
PCP UR QL: NEGATIVE
PEEP RESPIRATORY: 8 CM[H2O]
PH BLDA: 7.12 [PH] (ref 7.35–7.45)
PH BLDA: 7.14 [PH] (ref 7.35–7.45)
PH BLDA: 7.27 [PH] (ref 7.35–7.45)
PH BLDA: 7.45 [PH] (ref 7.35–7.45)
PH BLDA: 7.5 [PH] (ref 7.35–7.45)
PH BLDA: 7.5 [PH] (ref 7.35–7.45)
PH BLDV: 6.94 [PH] (ref 7.3–7.4)
PH BLDV: 7.31 [PH] (ref 7.3–7.4)
PH UR STRIP.AUTO: 5.5 [PH]
PHOSPHATE SERPL-MCNC: 2.4 MG/DL (ref 2.3–4.1)
PHOSPHATE SERPL-MCNC: 3 MG/DL (ref 2.3–4.1)
PHOSPHATE SERPL-MCNC: 3.4 MG/DL (ref 2.3–4.1)
PHOSPHATE SERPL-MCNC: 3.5 MG/DL (ref 2.3–4.1)
PHOSPHATE SERPL-MCNC: 3.5 MG/DL (ref 2.3–4.1)
PHOSPHATE SERPL-MCNC: 4 MG/DL (ref 2.3–4.1)
PHOSPHATE SERPL-MCNC: 8.7 MG/DL (ref 2.3–4.1)
PLATELET # BLD AUTO: 102 THOUSANDS/UL (ref 149–390)
PLATELET # BLD AUTO: 51 THOUSANDS/UL (ref 149–390)
PLATELET # BLD AUTO: 54 THOUSANDS/UL (ref 149–390)
PLATELET # BLD AUTO: 55 THOUSANDS/UL (ref 149–390)
PLATELET # BLD AUTO: 63 THOUSANDS/UL (ref 149–390)
PLATELET # BLD AUTO: 66 THOUSANDS/UL (ref 149–390)
PLATELET # BLD AUTO: 67 THOUSANDS/UL (ref 149–390)
PLATELET # BLD AUTO: 73 THOUSANDS/UL (ref 149–390)
PLATELET # BLD AUTO: 78 THOUSANDS/UL (ref 149–390)
PLATELET # BLD AUTO: 92 THOUSANDS/UL (ref 149–390)
PLATELET BLD QL SMEAR: ABNORMAL
PLATELET BLD QL SMEAR: ABNORMAL
PMV BLD AUTO: 11.1 FL (ref 8.9–12.7)
PMV BLD AUTO: 11.3 FL (ref 8.9–12.7)
PMV BLD AUTO: 11.3 FL (ref 8.9–12.7)
PMV BLD AUTO: 11.4 FL (ref 8.9–12.7)
PMV BLD AUTO: 11.5 FL (ref 8.9–12.7)
PMV BLD AUTO: 11.7 FL (ref 8.9–12.7)
PMV BLD AUTO: 11.9 FL (ref 8.9–12.7)
PMV BLD AUTO: 12.1 FL (ref 8.9–12.7)
PMV BLD AUTO: 12.1 FL (ref 8.9–12.7)
PMV BLD AUTO: 12.4 FL (ref 8.9–12.7)
PO2 BLDA: 107.6 MM HG (ref 75–129)
PO2 BLDA: 120.6 MM HG (ref 75–129)
PO2 BLDA: 76.5 MM HG (ref 75–129)
PO2 BLDA: 77.2 MM HG (ref 75–129)
PO2 BLDA: 93.5 MM HG (ref 75–129)
PO2 BLDA: 94.8 MM HG (ref 75–129)
PO2 BLDV: 169.1 MM HG (ref 35–45)
PO2 BLDV: 52 MM HG (ref 35–45)
POIKILOCYTOSIS BLD QL SMEAR: PRESENT
POTASSIUM SERPL-SCNC: 3.6 MMOL/L (ref 3.5–5.3)
POTASSIUM SERPL-SCNC: 3.7 MMOL/L (ref 3.5–5.3)
POTASSIUM SERPL-SCNC: 3.8 MMOL/L (ref 3.5–5.3)
POTASSIUM SERPL-SCNC: 3.8 MMOL/L (ref 3.5–5.3)
POTASSIUM SERPL-SCNC: 4 MMOL/L (ref 3.5–5.3)
POTASSIUM SERPL-SCNC: 4.7 MMOL/L (ref 3.5–5.3)
POTASSIUM SERPL-SCNC: 4.9 MMOL/L (ref 3.5–5.3)
POTASSIUM SERPL-SCNC: 5.6 MMOL/L (ref 3.5–5.3)
POTASSIUM SERPL-SCNC: 5.8 MMOL/L (ref 3.5–5.3)
PROCALCITONIN SERPL-MCNC: 0.39 NG/ML
PROCALCITONIN SERPL-MCNC: 2.98 NG/ML
PROCALCITONIN SERPL-MCNC: 4.99 NG/ML
PROT SERPL-MCNC: 4.3 G/DL (ref 6.4–8.4)
PROT SERPL-MCNC: 4.7 G/DL (ref 6.4–8.4)
PROT SERPL-MCNC: 4.8 G/DL (ref 6.4–8.4)
PROT SERPL-MCNC: 5.2 G/DL (ref 6.4–8.4)
PROT SERPL-MCNC: 5.2 G/DL (ref 6.4–8.4)
PROT SERPL-MCNC: 5.4 G/DL (ref 6.4–8.4)
PROT SERPL-MCNC: 5.4 G/DL (ref 6.4–8.4)
PROT SERPL-MCNC: 5.5 G/DL (ref 6.4–8.4)
PROT UR STRIP-MCNC: ABNORMAL MG/DL
PROTHROMBIN TIME: 22.8 SECONDS (ref 12.3–15)
PROTHROMBIN TIME: 28.6 SECONDS (ref 12.3–15)
PROTHROMBIN TIME: 28.9 SECONDS (ref 12.3–15)
PROTHROMBIN TIME: 33.6 SECONDS (ref 12.3–15)
PROTHROMBIN TIME: 36.3 SECONDS (ref 12.3–15)
PROTHROMBIN TIME: 37.1 SECONDS (ref 12.3–15)
RBC # BLD AUTO: 1.98 MILLION/UL (ref 3.88–5.62)
RBC # BLD AUTO: 2.51 MILLION/UL (ref 3.88–5.62)
RBC # BLD AUTO: 3.39 MILLION/UL (ref 3.88–5.62)
RBC # BLD AUTO: 3.54 MILLION/UL (ref 3.88–5.62)
RBC # BLD AUTO: 3.56 MILLION/UL (ref 3.88–5.62)
RBC # BLD AUTO: 3.58 MILLION/UL (ref 3.88–5.62)
RBC # BLD AUTO: 3.61 MILLION/UL (ref 3.88–5.62)
RBC # BLD AUTO: 3.71 MILLION/UL (ref 3.88–5.62)
RBC # BLD AUTO: 3.72 MILLION/UL (ref 3.88–5.62)
RBC # BLD AUTO: 3.74 MILLION/UL (ref 3.88–5.62)
RBC #/AREA URNS AUTO: ABNORMAL /HPF
RH BLD: POSITIVE
RIGHT ATRIUM AREA SYSTOLE A4C: 20 CM2
RIGHT VENTRICLE ID DIMENSION: 4 CM
SALICYLATES SERPL-MCNC: <5 MG/DL (ref 3–20)
SARS-COV+SARS-COV-2 AG RESP QL IA.RAPID: NEGATIVE
SL CV LEFT ATRIUM LENGTH A2C: 5.6 CM
SL CV LV EF: 60
SL CV PED ECHO LEFT VENTRICLE DIASTOLIC VOLUME (MOD BIPLANE) 2D: 118 ML
SL CV PED ECHO LEFT VENTRICLE SYSTOLIC VOLUME (MOD BIPLANE) 2D: 24 ML
SODIUM SERPL-SCNC: 136 MMOL/L (ref 135–147)
SODIUM SERPL-SCNC: 139 MMOL/L (ref 135–147)
SODIUM SERPL-SCNC: 139 MMOL/L (ref 135–147)
SODIUM SERPL-SCNC: 140 MMOL/L (ref 135–147)
SODIUM SERPL-SCNC: 141 MMOL/L (ref 135–147)
SODIUM SERPL-SCNC: 141 MMOL/L (ref 135–147)
SODIUM SERPL-SCNC: 142 MMOL/L (ref 135–147)
SODIUM SERPL-SCNC: 143 MMOL/L (ref 135–147)
SODIUM SERPL-SCNC: 144 MMOL/L (ref 135–147)
SODIUM SERPL-SCNC: 145 MMOL/L (ref 135–147)
SODIUM SERPL-SCNC: 145 MMOL/L (ref 135–147)
SODIUM SERPL-SCNC: 146 MMOL/L (ref 135–147)
SODIUM SERPL-SCNC: 147 MMOL/L (ref 135–147)
SP GR UR STRIP.AUTO: 1.02 (ref 1–1.03)
SPECIMEN EXPIRATION DATE: NORMAL
SPECIMEN SOURCE: ABNORMAL
THC UR QL: NEGATIVE
TRICUSPID ANNULAR PLANE SYSTOLIC EXCURSION: 1.7 CM
TSH SERPL DL<=0.05 MIU/L-ACNC: 1.04 UIU/ML (ref 0.45–4.5)
UNIT DISPENSE STATUS: NORMAL
UNIT PRODUCT CODE: NORMAL
UNIT PRODUCT VOLUME: 125 ML
UNIT PRODUCT VOLUME: 125 ML
UNIT PRODUCT VOLUME: 204 ML
UNIT PRODUCT VOLUME: 250 ML
UNIT PRODUCT VOLUME: 265 ML
UNIT PRODUCT VOLUME: 275 ML
UNIT PRODUCT VOLUME: 280 ML
UNIT PRODUCT VOLUME: 311 ML
UNIT PRODUCT VOLUME: 321 ML
UNIT PRODUCT VOLUME: 350 ML
UNIT RH: NORMAL
UROBILINOGEN UR QL STRIP.AUTO: 1 E.U./DL
VENT AC: 20
VENT AC: 24
VENT- AC: AC
VT SETTING VENT: 450 ML
WBC # BLD AUTO: 15.72 THOUSAND/UL (ref 4.31–10.16)
WBC # BLD AUTO: 17.09 THOUSAND/UL (ref 4.31–10.16)
WBC # BLD AUTO: 18.53 THOUSAND/UL (ref 4.31–10.16)
WBC # BLD AUTO: 18.72 THOUSAND/UL (ref 4.31–10.16)
WBC # BLD AUTO: 19.14 THOUSAND/UL (ref 4.31–10.16)
WBC # BLD AUTO: 19.34 THOUSAND/UL (ref 4.31–10.16)
WBC # BLD AUTO: 21.11 THOUSAND/UL (ref 4.31–10.16)
WBC # BLD AUTO: 22.06 THOUSAND/UL (ref 4.31–10.16)
WBC # BLD AUTO: 22.26 THOUSAND/UL (ref 4.31–10.16)
WBC # BLD AUTO: 24.31 THOUSAND/UL (ref 4.31–10.16)
WBC #/AREA URNS AUTO: ABNORMAL /HPF
WBC TOXIC VACUOLES BLD QL SMEAR: PRESENT

## 2024-01-01 PROCEDURE — 96372 THER/PROPH/DIAG INJ SC/IM: CPT

## 2024-01-01 PROCEDURE — 83735 ASSAY OF MAGNESIUM: CPT | Performed by: NURSE PRACTITIONER

## 2024-01-01 PROCEDURE — 82805 BLOOD GASES W/O2 SATURATION: CPT | Performed by: EMERGENCY MEDICINE

## 2024-01-01 PROCEDURE — 4A133J1 MONITORING OF ARTERIAL PULSE, PERIPHERAL, PERCUTANEOUS APPROACH: ICD-10-PCS | Performed by: INTERNAL MEDICINE

## 2024-01-01 PROCEDURE — 80053 COMPREHEN METABOLIC PANEL: CPT

## 2024-01-01 PROCEDURE — 99291 CRITICAL CARE FIRST HOUR: CPT | Performed by: INTERNAL MEDICINE

## 2024-01-01 PROCEDURE — 72125 CT NECK SPINE W/O DYE: CPT

## 2024-01-01 PROCEDURE — 94003 VENT MGMT INPAT SUBQ DAY: CPT

## 2024-01-01 PROCEDURE — 85610 PROTHROMBIN TIME: CPT | Performed by: ANESTHESIOLOGY

## 2024-01-01 PROCEDURE — 84484 ASSAY OF TROPONIN QUANT: CPT | Performed by: EMERGENCY MEDICINE

## 2024-01-01 PROCEDURE — 85027 COMPLETE CBC AUTOMATED: CPT | Performed by: NURSE PRACTITIONER

## 2024-01-01 PROCEDURE — 80179 DRUG ASSAY SALICYLATE: CPT | Performed by: EMERGENCY MEDICINE

## 2024-01-01 PROCEDURE — P9016 RBC LEUKOCYTES REDUCED: HCPCS

## 2024-01-01 PROCEDURE — 36556 INSERT NON-TUNNEL CV CATH: CPT | Performed by: EMERGENCY MEDICINE

## 2024-01-01 PROCEDURE — P9012 CRYOPRECIPITATE EACH UNIT: HCPCS

## 2024-01-01 PROCEDURE — 80053 COMPREHEN METABOLIC PANEL: CPT | Performed by: NURSE PRACTITIONER

## 2024-01-01 PROCEDURE — 84100 ASSAY OF PHOSPHORUS: CPT | Performed by: NURSE PRACTITIONER

## 2024-01-01 PROCEDURE — 71045 X-RAY EXAM CHEST 1 VIEW: CPT

## 2024-01-01 PROCEDURE — 83605 ASSAY OF LACTIC ACID: CPT | Performed by: EMERGENCY MEDICINE

## 2024-01-01 PROCEDURE — 85610 PROTHROMBIN TIME: CPT

## 2024-01-01 PROCEDURE — 83605 ASSAY OF LACTIC ACID: CPT | Performed by: NURSE PRACTITIONER

## 2024-01-01 PROCEDURE — 82805 BLOOD GASES W/O2 SATURATION: CPT

## 2024-01-01 PROCEDURE — 82140 ASSAY OF AMMONIA: CPT

## 2024-01-01 PROCEDURE — 85027 COMPLETE CBC AUTOMATED: CPT

## 2024-01-01 PROCEDURE — 99291 CRITICAL CARE FIRST HOUR: CPT

## 2024-01-01 PROCEDURE — 99233 SBSQ HOSP IP/OBS HIGH 50: CPT | Performed by: INTERNAL MEDICINE

## 2024-01-01 PROCEDURE — 94002 VENT MGMT INPAT INIT DAY: CPT

## 2024-01-01 PROCEDURE — 86923 COMPATIBILITY TEST ELECTRIC: CPT

## 2024-01-01 PROCEDURE — 80048 BASIC METABOLIC PNL TOTAL CA: CPT

## 2024-01-01 PROCEDURE — 84145 PROCALCITONIN (PCT): CPT | Performed by: EMERGENCY MEDICINE

## 2024-01-01 PROCEDURE — 83880 ASSAY OF NATRIURETIC PEPTIDE: CPT | Performed by: EMERGENCY MEDICINE

## 2024-01-01 PROCEDURE — 80048 BASIC METABOLIC PNL TOTAL CA: CPT | Performed by: EMERGENCY MEDICINE

## 2024-01-01 PROCEDURE — 83735 ASSAY OF MAGNESIUM: CPT

## 2024-01-01 PROCEDURE — 82330 ASSAY OF CALCIUM: CPT

## 2024-01-01 PROCEDURE — 31500 INSERT EMERGENCY AIRWAY: CPT

## 2024-01-01 PROCEDURE — 82077 ASSAY SPEC XCP UR&BREATH IA: CPT | Performed by: EMERGENCY MEDICINE

## 2024-01-01 PROCEDURE — 80048 BASIC METABOLIC PNL TOTAL CA: CPT | Performed by: INTERNAL MEDICINE

## 2024-01-01 PROCEDURE — 85610 PROTHROMBIN TIME: CPT | Performed by: EMERGENCY MEDICINE

## 2024-01-01 PROCEDURE — 93306 TTE W/DOPPLER COMPLETE: CPT

## 2024-01-01 PROCEDURE — 31500 INSERT EMERGENCY AIRWAY: CPT | Performed by: NURSE PRACTITIONER

## 2024-01-01 PROCEDURE — 83605 ASSAY OF LACTIC ACID: CPT

## 2024-01-01 PROCEDURE — 5A1945Z RESPIRATORY VENTILATION, 24-96 CONSECUTIVE HOURS: ICD-10-PCS | Performed by: INTERNAL MEDICINE

## 2024-01-01 PROCEDURE — 4A133B1 MONITORING OF ARTERIAL PRESSURE, PERIPHERAL, PERCUTANEOUS APPROACH: ICD-10-PCS | Performed by: INTERNAL MEDICINE

## 2024-01-01 PROCEDURE — 82140 ASSAY OF AMMONIA: CPT | Performed by: EMERGENCY MEDICINE

## 2024-01-01 PROCEDURE — 85730 THROMBOPLASTIN TIME PARTIAL: CPT | Performed by: NURSE PRACTITIONER

## 2024-01-01 PROCEDURE — 86920 COMPATIBILITY TEST SPIN: CPT

## 2024-01-01 PROCEDURE — P9017 PLASMA 1 DONOR FRZ W/IN 8 HR: HCPCS

## 2024-01-01 PROCEDURE — 87040 BLOOD CULTURE FOR BACTERIA: CPT | Performed by: EMERGENCY MEDICINE

## 2024-01-01 PROCEDURE — 82948 REAGENT STRIP/BLOOD GLUCOSE: CPT

## 2024-01-01 PROCEDURE — 83735 ASSAY OF MAGNESIUM: CPT | Performed by: EMERGENCY MEDICINE

## 2024-01-01 PROCEDURE — 94760 N-INVAS EAR/PLS OXIMETRY 1: CPT

## 2024-01-01 PROCEDURE — 99291 CRITICAL CARE FIRST HOUR: CPT | Performed by: EMERGENCY MEDICINE

## 2024-01-01 PROCEDURE — 86901 BLOOD TYPING SEROLOGIC RH(D): CPT | Performed by: EMERGENCY MEDICINE

## 2024-01-01 PROCEDURE — 93306 TTE W/DOPPLER COMPLETE: CPT | Performed by: INTERNAL MEDICINE

## 2024-01-01 PROCEDURE — 04HY32Z INSERTION OF MONITORING DEVICE INTO LOWER ARTERY, PERCUTANEOUS APPROACH: ICD-10-PCS | Performed by: INTERNAL MEDICINE

## 2024-01-01 PROCEDURE — 80048 BASIC METABOLIC PNL TOTAL CA: CPT | Performed by: NURSE PRACTITIONER

## 2024-01-01 PROCEDURE — 71260 CT THORAX DX C+: CPT

## 2024-01-01 PROCEDURE — 06L38CZ OCCLUSION OF ESOPHAGEAL VEIN WITH EXTRALUMINAL DEVICE, VIA NATURAL OR ARTIFICIAL OPENING ENDOSCOPIC: ICD-10-PCS | Performed by: INTERNAL MEDICINE

## 2024-01-01 PROCEDURE — 83615 LACTATE (LD) (LDH) ENZYME: CPT | Performed by: NURSE PRACTITIONER

## 2024-01-01 PROCEDURE — 83735 ASSAY OF MAGNESIUM: CPT | Performed by: INTERNAL MEDICINE

## 2024-01-01 PROCEDURE — 85007 BL SMEAR W/DIFF WBC COUNT: CPT | Performed by: EMERGENCY MEDICINE

## 2024-01-01 PROCEDURE — 36415 COLL VENOUS BLD VENIPUNCTURE: CPT | Performed by: EMERGENCY MEDICINE

## 2024-01-01 PROCEDURE — 81001 URINALYSIS AUTO W/SCOPE: CPT | Performed by: EMERGENCY MEDICINE

## 2024-01-01 PROCEDURE — 80307 DRUG TEST PRSMV CHEM ANLYZR: CPT | Performed by: EMERGENCY MEDICINE

## 2024-01-01 PROCEDURE — 80143 DRUG ASSAY ACETAMINOPHEN: CPT | Performed by: EMERGENCY MEDICINE

## 2024-01-01 PROCEDURE — NC001 PR NO CHARGE: Performed by: NURSE PRACTITIONER

## 2024-01-01 PROCEDURE — 85007 BL SMEAR W/DIFF WBC COUNT: CPT | Performed by: NURSE PRACTITIONER

## 2024-01-01 PROCEDURE — 87811 SARS-COV-2 COVID19 W/OPTIC: CPT | Performed by: EMERGENCY MEDICINE

## 2024-01-01 PROCEDURE — 84100 ASSAY OF PHOSPHORUS: CPT | Performed by: INTERNAL MEDICINE

## 2024-01-01 PROCEDURE — 99292 CRITICAL CARE ADDL 30 MIN: CPT | Performed by: EMERGENCY MEDICINE

## 2024-01-01 PROCEDURE — 96368 THER/DIAG CONCURRENT INF: CPT

## 2024-01-01 PROCEDURE — 96367 TX/PROPH/DG ADDL SEQ IV INF: CPT

## 2024-01-01 PROCEDURE — 76705 ECHO EXAM OF ABDOMEN: CPT

## 2024-01-01 PROCEDURE — 80076 HEPATIC FUNCTION PANEL: CPT | Performed by: EMERGENCY MEDICINE

## 2024-01-01 PROCEDURE — 03HY32Z INSERTION OF MONITORING DEVICE INTO UPPER ARTERY, PERCUTANEOUS APPROACH: ICD-10-PCS | Performed by: INTERNAL MEDICINE

## 2024-01-01 PROCEDURE — 0W9G3ZX DRAINAGE OF PERITONEAL CAVITY, PERCUTANEOUS APPROACH, DIAGNOSTIC: ICD-10-PCS | Performed by: EMERGENCY MEDICINE

## 2024-01-01 PROCEDURE — 85610 PROTHROMBIN TIME: CPT | Performed by: NURSE PRACTITIONER

## 2024-01-01 PROCEDURE — 84145 PROCALCITONIN (PCT): CPT | Performed by: ANESTHESIOLOGY

## 2024-01-01 PROCEDURE — 84443 ASSAY THYROID STIM HORMONE: CPT | Performed by: EMERGENCY MEDICINE

## 2024-01-01 PROCEDURE — 86850 RBC ANTIBODY SCREEN: CPT | Performed by: EMERGENCY MEDICINE

## 2024-01-01 PROCEDURE — 82140 ASSAY OF AMMONIA: CPT | Performed by: NURSE PRACTITIONER

## 2024-01-01 PROCEDURE — 82805 BLOOD GASES W/O2 SATURATION: CPT | Performed by: NURSE PRACTITIONER

## 2024-01-01 PROCEDURE — NC001 PR NO CHARGE

## 2024-01-01 PROCEDURE — 82330 ASSAY OF CALCIUM: CPT | Performed by: INTERNAL MEDICINE

## 2024-01-01 PROCEDURE — 84100 ASSAY OF PHOSPHORUS: CPT

## 2024-01-01 PROCEDURE — 85730 THROMBOPLASTIN TIME PARTIAL: CPT | Performed by: EMERGENCY MEDICINE

## 2024-01-01 PROCEDURE — 36620 INSERTION CATHETER ARTERY: CPT | Performed by: NURSE PRACTITIONER

## 2024-01-01 PROCEDURE — 74177 CT ABD & PELVIS W/CONTRAST: CPT

## 2024-01-01 PROCEDURE — 05HM33Z INSERTION OF INFUSION DEVICE INTO RIGHT INTERNAL JUGULAR VEIN, PERCUTANEOUS APPROACH: ICD-10-PCS | Performed by: INTERNAL MEDICINE

## 2024-01-01 PROCEDURE — 99497 ADVNCD CARE PLAN 30 MIN: CPT | Performed by: ANESTHESIOLOGY

## 2024-01-01 PROCEDURE — 87804 INFLUENZA ASSAY W/OPTIC: CPT | Performed by: EMERGENCY MEDICINE

## 2024-01-01 PROCEDURE — 82550 ASSAY OF CK (CPK): CPT | Performed by: NURSE PRACTITIONER

## 2024-01-01 PROCEDURE — 43244 EGD VARICES LIGATION: CPT | Performed by: INTERNAL MEDICINE

## 2024-01-01 PROCEDURE — 99238 HOSP IP/OBS DSCHRG MGMT 30/<: CPT

## 2024-01-01 PROCEDURE — 0BH17EZ INSERTION OF ENDOTRACHEAL AIRWAY INTO TRACHEA, VIA NATURAL OR ARTIFICIAL OPENING: ICD-10-PCS | Performed by: INTERNAL MEDICINE

## 2024-01-01 PROCEDURE — 49083 ABD PARACENTESIS W/IMAGING: CPT | Performed by: EMERGENCY MEDICINE

## 2024-01-01 PROCEDURE — 86900 BLOOD TYPING SEROLOGIC ABO: CPT | Performed by: EMERGENCY MEDICINE

## 2024-01-01 PROCEDURE — 70450 CT HEAD/BRAIN W/O DYE: CPT

## 2024-01-01 PROCEDURE — 02HV33Z INSERTION OF INFUSION DEVICE INTO SUPERIOR VENA CAVA, PERCUTANEOUS APPROACH: ICD-10-PCS | Performed by: INTERNAL MEDICINE

## 2024-01-01 PROCEDURE — G0390 TRAUMA RESPONS W/HOSP CRITI: HCPCS

## 2024-01-01 PROCEDURE — 82330 ASSAY OF CALCIUM: CPT | Performed by: NURSE PRACTITIONER

## 2024-01-01 PROCEDURE — 99292 CRITICAL CARE ADDL 30 MIN: CPT

## 2024-01-01 PROCEDURE — 96375 TX/PRO/DX INJ NEW DRUG ADDON: CPT

## 2024-01-01 PROCEDURE — 0DJ08ZZ INSPECTION OF UPPER INTESTINAL TRACT, VIA NATURAL OR ARTIFICIAL OPENING ENDOSCOPIC: ICD-10-PCS | Performed by: INTERNAL MEDICINE

## 2024-01-01 PROCEDURE — 99255 IP/OBS CONSLTJ NEW/EST HI 80: CPT | Performed by: INTERNAL MEDICINE

## 2024-01-01 PROCEDURE — 84484 ASSAY OF TROPONIN QUANT: CPT

## 2024-01-01 PROCEDURE — 36556 INSERT NON-TUNNEL CV CATH: CPT | Performed by: INTERNAL MEDICINE

## 2024-01-01 PROCEDURE — 84145 PROCALCITONIN (PCT): CPT | Performed by: NURSE PRACTITIONER

## 2024-01-01 PROCEDURE — 99291 CRITICAL CARE FIRST HOUR: CPT | Performed by: ANESTHESIOLOGY

## 2024-01-01 PROCEDURE — 85027 COMPLETE CBC AUTOMATED: CPT | Performed by: EMERGENCY MEDICINE

## 2024-01-01 PROCEDURE — P9099 BLOOD COMPONENT/PRODUCT NOC: HCPCS

## 2024-01-01 PROCEDURE — 96365 THER/PROPH/DIAG IV INF INIT: CPT

## 2024-01-01 PROCEDURE — P9035 PLATELET PHERES LEUKOREDUCED: HCPCS

## 2024-01-01 PROCEDURE — 99292 CRITICAL CARE ADDL 30 MIN: CPT | Performed by: INTERNAL MEDICINE

## 2024-01-01 PROCEDURE — 36430 TRANSFUSION BLD/BLD COMPNT: CPT

## 2024-01-01 RX ORDER — POTASSIUM CHLORIDE 14.9 MG/ML
20 INJECTION INTRAVENOUS ONCE
Status: COMPLETED | OUTPATIENT
Start: 2024-01-01 | End: 2024-01-01

## 2024-01-01 RX ORDER — CEPHALEXIN 500 MG/1
1 CAPSULE ORAL 2 TIMES DAILY
COMMUNITY
Start: 2024-01-01 | End: 2024-01-01

## 2024-01-01 RX ORDER — CALCIUM GLUCONATE 20 MG/ML
1 INJECTION, SOLUTION INTRAVENOUS
Status: DISCONTINUED | OUTPATIENT
Start: 2024-01-01 | End: 2024-01-01

## 2024-01-01 RX ORDER — ALBUMIN HUMAN 50 G/1000ML
SOLUTION INTRAVENOUS
Status: DISPENSED
Start: 2024-01-01 | End: 2024-01-01

## 2024-01-01 RX ORDER — PROPOFOL 10 MG/ML
INJECTION, EMULSION INTRAVENOUS
Status: DISPENSED
Start: 2024-01-01 | End: 2024-01-01

## 2024-01-01 RX ORDER — PANTOPRAZOLE SODIUM 40 MG/10ML
40 INJECTION, POWDER, LYOPHILIZED, FOR SOLUTION INTRAVENOUS EVERY 12 HOURS SCHEDULED
Status: DISCONTINUED | OUTPATIENT
Start: 2024-01-01 | End: 2024-01-01

## 2024-01-01 RX ORDER — BUMETANIDE 0.25 MG/ML
2 INJECTION, SOLUTION INTRAMUSCULAR; INTRAVENOUS ONCE
Status: COMPLETED | OUTPATIENT
Start: 2024-01-01 | End: 2024-01-01

## 2024-01-01 RX ORDER — MIDAZOLAM HYDROCHLORIDE 2 MG/2ML
2 INJECTION, SOLUTION INTRAMUSCULAR; INTRAVENOUS
Status: DISCONTINUED | OUTPATIENT
Start: 2024-01-01 | End: 2024-01-01

## 2024-01-01 RX ORDER — GLYCOPYRROLATE 0.2 MG/ML
0.1 INJECTION INTRAMUSCULAR; INTRAVENOUS EVERY 4 HOURS PRN
Status: DISCONTINUED | OUTPATIENT
Start: 2024-01-01 | End: 2024-01-01 | Stop reason: HOSPADM

## 2024-01-01 RX ORDER — PHYTONADIONE 10 MG/ML
10 INJECTION, EMULSION INTRAMUSCULAR; INTRAVENOUS; SUBCUTANEOUS ONCE
Status: COMPLETED | OUTPATIENT
Start: 2024-01-01 | End: 2024-01-01

## 2024-01-01 RX ORDER — VECURONIUM BROMIDE 1 MG/ML
10 INJECTION, POWDER, LYOPHILIZED, FOR SOLUTION INTRAVENOUS ONCE
Status: COMPLETED | OUTPATIENT
Start: 2024-01-01 | End: 2024-01-01

## 2024-01-01 RX ORDER — CALCIUM GLUCONATE 20 MG/ML
2 INJECTION, SOLUTION INTRAVENOUS ONCE
Status: COMPLETED | OUTPATIENT
Start: 2024-01-01 | End: 2024-01-01

## 2024-01-01 RX ORDER — FENTANYL CITRATE-0.9 % NACL/PF 10 MCG/ML
100 PLASTIC BAG, INJECTION (ML) INTRAVENOUS CONTINUOUS
Status: DISCONTINUED | OUTPATIENT
Start: 2024-01-01 | End: 2024-01-01

## 2024-01-01 RX ORDER — ALBUMIN (HUMAN) 12.5 G/50ML
12.5 SOLUTION INTRAVENOUS EVERY 6 HOURS
Status: DISCONTINUED | OUTPATIENT
Start: 2024-01-01 | End: 2024-01-01

## 2024-01-01 RX ORDER — FUROSEMIDE 10 MG/ML
20 SYRINGE (ML) INJECTION CONTINUOUS
Status: DISCONTINUED | OUTPATIENT
Start: 2024-01-01 | End: 2024-01-01

## 2024-01-01 RX ORDER — FENTANYL CITRATE 50 UG/ML
50 INJECTION, SOLUTION INTRAMUSCULAR; INTRAVENOUS ONCE
Refills: 0 | Status: COMPLETED | OUTPATIENT
Start: 2024-01-01 | End: 2024-01-01

## 2024-01-01 RX ORDER — FENTANYL CITRATE 50 UG/ML
INJECTION, SOLUTION INTRAMUSCULAR; INTRAVENOUS
Status: COMPLETED
Start: 2024-01-01 | End: 2024-01-01

## 2024-01-01 RX ORDER — OXYCODONE AND ACETAMINOPHEN 5; 325 MG/1; MG/1
1 TABLET ORAL EVERY 6 HOURS PRN
COMMUNITY
Start: 2024-01-01 | End: 2024-01-01

## 2024-01-01 RX ORDER — DEXTROSE MONOHYDRATE 25 G/50ML
50 INJECTION, SOLUTION INTRAVENOUS ONCE
Status: COMPLETED | OUTPATIENT
Start: 2024-01-01 | End: 2024-01-01

## 2024-01-01 RX ORDER — MAGNESIUM SULFATE HEPTAHYDRATE 40 MG/ML
2 INJECTION, SOLUTION INTRAVENOUS ONCE
Status: COMPLETED | OUTPATIENT
Start: 2024-01-01 | End: 2024-01-01

## 2024-01-01 RX ORDER — ETOMIDATE 2 MG/ML
20 INJECTION INTRAVENOUS ONCE
Status: COMPLETED | OUTPATIENT
Start: 2024-01-01 | End: 2024-01-01

## 2024-01-01 RX ORDER — ALBUMIN (HUMAN) 12.5 G/50ML
50 SOLUTION INTRAVENOUS ONCE
Status: DISCONTINUED | OUTPATIENT
Start: 2024-01-01 | End: 2024-01-01

## 2024-01-01 RX ORDER — MIDAZOLAM HYDROCHLORIDE 2 MG/2ML
2 INJECTION, SOLUTION INTRAMUSCULAR; INTRAVENOUS ONCE
Status: COMPLETED | OUTPATIENT
Start: 2024-01-01 | End: 2024-01-01

## 2024-01-01 RX ORDER — POTASSIUM CHLORIDE 14.9 MG/ML
20 INJECTION INTRAVENOUS ONCE
Status: DISCONTINUED | OUTPATIENT
Start: 2024-01-01 | End: 2024-01-01

## 2024-01-01 RX ORDER — FUROSEMIDE 10 MG/ML
80 INJECTION INTRAMUSCULAR; INTRAVENOUS ONCE
Status: COMPLETED | OUTPATIENT
Start: 2024-01-01 | End: 2024-01-01

## 2024-01-01 RX ORDER — ALBUMIN HUMAN 50 G/1000ML
50 SOLUTION INTRAVENOUS ONCE
Status: COMPLETED | OUTPATIENT
Start: 2024-01-01 | End: 2024-01-01

## 2024-01-01 RX ORDER — DEXTROSE MONOHYDRATE 25 G/50ML
INJECTION, SOLUTION INTRAVENOUS
Status: COMPLETED
Start: 2024-01-01 | End: 2024-01-01

## 2024-01-01 RX ORDER — LORAZEPAM 2 MG/ML
1 INJECTION INTRAMUSCULAR
Status: DISCONTINUED | OUTPATIENT
Start: 2024-01-01 | End: 2024-01-01 | Stop reason: HOSPADM

## 2024-01-01 RX ORDER — DEXTROSE MONOHYDRATE AND SODIUM CHLORIDE 5; .9 G/100ML; G/100ML
50 INJECTION, SOLUTION INTRAVENOUS CONTINUOUS
Status: DISCONTINUED | OUTPATIENT
Start: 2024-01-01 | End: 2024-01-01

## 2024-01-01 RX ORDER — POTASSIUM CHLORIDE 20MEQ/15ML
40 LIQUID (ML) ORAL ONCE
Status: DISCONTINUED | OUTPATIENT
Start: 2024-01-01 | End: 2024-01-01

## 2024-01-01 RX ORDER — AMOXICILLIN 875 MG/1
TABLET, COATED ORAL
COMMUNITY
Start: 2024-01-01 | End: 2024-01-01

## 2024-01-01 RX ORDER — CHLORHEXIDINE GLUCONATE ORAL RINSE 1.2 MG/ML
15 SOLUTION DENTAL EVERY 12 HOURS SCHEDULED
Status: DISCONTINUED | OUTPATIENT
Start: 2024-01-01 | End: 2024-01-01

## 2024-01-01 RX ORDER — ROCURONIUM BROMIDE 10 MG/ML
50 INJECTION, SOLUTION INTRAVENOUS ONCE
Status: COMPLETED | OUTPATIENT
Start: 2024-01-01 | End: 2024-01-01

## 2024-01-01 RX ORDER — FENTANYL CITRATE-0.9 % NACL/PF 10 MCG/ML
25 PLASTIC BAG, INJECTION (ML) INTRAVENOUS CONTINUOUS
Status: DISCONTINUED | OUTPATIENT
Start: 2024-01-01 | End: 2024-01-01

## 2024-01-01 RX ORDER — BUMETANIDE 0.25 MG/ML
1 INJECTION INTRAMUSCULAR; INTRAVENOUS CONTINUOUS
Status: DISCONTINUED | OUTPATIENT
Start: 2024-01-01 | End: 2024-01-01

## 2024-01-01 RX ORDER — ONDANSETRON 2 MG/ML
1 INJECTION INTRAMUSCULAR; INTRAVENOUS ONCE
Status: COMPLETED | OUTPATIENT
Start: 2024-01-01 | End: 2024-01-01

## 2024-01-01 RX ORDER — EPINEPHRINE 1 MG/ML
INJECTION, SOLUTION, CONCENTRATE INTRAVENOUS
Status: DISPENSED
Start: 2024-01-01 | End: 2024-01-01

## 2024-01-01 RX ORDER — PROPOFOL 10 MG/ML
INJECTION, EMULSION INTRAVENOUS AS NEEDED
Status: DISCONTINUED | OUTPATIENT
Start: 2024-01-01 | End: 2024-01-01

## 2024-01-01 RX ORDER — EPINEPHRINE 1 MG/ML
INJECTION, SOLUTION, CONCENTRATE INTRAVENOUS
Status: DISCONTINUED
Start: 2024-01-01 | End: 2024-01-01 | Stop reason: WASHOUT

## 2024-01-01 RX ORDER — HALOPERIDOL 5 MG/ML
0.5 INJECTION INTRAMUSCULAR EVERY 2 HOUR PRN
Status: DISCONTINUED | OUTPATIENT
Start: 2024-01-01 | End: 2024-01-01 | Stop reason: HOSPADM

## 2024-01-01 RX ORDER — DEXTROSE MONOHYDRATE 25 G/50ML
25 INJECTION, SOLUTION INTRAVENOUS ONCE
Status: COMPLETED | OUTPATIENT
Start: 2024-01-01 | End: 2024-01-01

## 2024-01-01 RX ORDER — PROPOFOL 10 MG/ML
5-50 INJECTION, EMULSION INTRAVENOUS
Status: DISCONTINUED | OUTPATIENT
Start: 2024-01-01 | End: 2024-01-01

## 2024-01-01 RX ORDER — CALCIUM CHLORIDE 100 MG/ML
2 INJECTION INTRAVENOUS; INTRAVENTRICULAR ONCE
Status: COMPLETED | OUTPATIENT
Start: 2024-01-01 | End: 2024-01-01

## 2024-01-01 RX ORDER — METOCLOPRAMIDE HYDROCHLORIDE 5 MG/ML
10 INJECTION INTRAMUSCULAR; INTRAVENOUS ONCE
Status: DISCONTINUED | OUTPATIENT
Start: 2024-01-01 | End: 2024-01-01

## 2024-01-01 RX ORDER — SODIUM CHLORIDE, SODIUM LACTATE, POTASSIUM CHLORIDE, CALCIUM CHLORIDE 600; 310; 30; 20 MG/100ML; MG/100ML; MG/100ML; MG/100ML
125 INJECTION, SOLUTION INTRAVENOUS CONTINUOUS
OUTPATIENT
Start: 2024-01-01

## 2024-01-01 RX ORDER — METRONIDAZOLE 500 MG/100ML
500 INJECTION, SOLUTION INTRAVENOUS EVERY 8 HOURS
Status: DISCONTINUED | OUTPATIENT
Start: 2024-01-01 | End: 2024-01-01

## 2024-01-01 RX ORDER — ALBUMIN HUMAN 50 G/1000ML
25 SOLUTION INTRAVENOUS ONCE
Status: DISCONTINUED | OUTPATIENT
Start: 2024-01-01 | End: 2024-01-01

## 2024-01-01 RX ORDER — ALBUMIN HUMAN 50 G/1000ML
25 SOLUTION INTRAVENOUS ONCE
Status: COMPLETED | OUTPATIENT
Start: 2024-01-01 | End: 2024-01-01

## 2024-01-01 RX ORDER — ALBUMIN HUMAN 50 G/1000ML
SOLUTION INTRAVENOUS
Status: COMPLETED
Start: 2024-01-01 | End: 2024-01-01

## 2024-01-01 RX ORDER — CALCIUM GLUCONATE 20 MG/ML
2 INJECTION, SOLUTION INTRAVENOUS ONCE
Status: DISCONTINUED | OUTPATIENT
Start: 2024-01-01 | End: 2024-01-01

## 2024-01-01 RX ADMIN — OCTREOTIDE ACETATE 50 MCG/HR: 500 INJECTION, SOLUTION INTRAVENOUS; SUBCUTANEOUS at 15:55

## 2024-01-01 RX ADMIN — POTASSIUM CHLORIDE 20 MEQ: 14.9 INJECTION, SOLUTION INTRAVENOUS at 08:48

## 2024-01-01 RX ADMIN — PANTOPRAZOLE SODIUM 40 MG: 40 INJECTION, POWDER, FOR SOLUTION INTRAVENOUS at 09:05

## 2024-01-01 RX ADMIN — NOREPINEPHRINE BITARTRATE: 1 INJECTION, SOLUTION, CONCENTRATE INTRAVENOUS at 05:32

## 2024-01-01 RX ADMIN — CHLORHEXIDINE GLUCONATE 0.12% ORAL RINSE 15 ML: 1.2 LIQUID ORAL at 20:29

## 2024-01-01 RX ADMIN — CHLORHEXIDINE GLUCONATE 0.12% ORAL RINSE 15 ML: 1.2 LIQUID ORAL at 08:01

## 2024-01-01 RX ADMIN — MIDAZOLAM 2 MG: 1 INJECTION INTRAMUSCULAR; INTRAVENOUS at 06:37

## 2024-01-01 RX ADMIN — LACTULOSE 200 G: 10 SOLUTION ORAL at 00:20

## 2024-01-01 RX ADMIN — NOREPINEPHRINE BITARTRATE 5 MCG/MIN: 1 INJECTION, SOLUTION, CONCENTRATE INTRAVENOUS at 04:50

## 2024-01-01 RX ADMIN — MIDAZOLAM 2 MG: 1 INJECTION INTRAMUSCULAR; INTRAVENOUS at 08:02

## 2024-01-01 RX ADMIN — CALCIUM GLUCONATE 1 G: 20 INJECTION, SOLUTION INTRAVENOUS at 19:16

## 2024-01-01 RX ADMIN — ALBUMIN (HUMAN) 12.5 G: 0.25 INJECTION, SOLUTION INTRAVENOUS at 11:50

## 2024-01-01 RX ADMIN — OCTREOTIDE ACETATE 50 MCG/HR: 500 INJECTION, SOLUTION INTRAVENOUS; SUBCUTANEOUS at 13:13

## 2024-01-01 RX ADMIN — CALCIUM GLUCONATE 2 G: 20 INJECTION, SOLUTION INTRAVENOUS at 08:03

## 2024-01-01 RX ADMIN — PANTOPRAZOLE SODIUM 40 MG: 40 INJECTION, POWDER, FOR SOLUTION INTRAVENOUS at 08:01

## 2024-01-01 RX ADMIN — POTASSIUM CHLORIDE 20 MEQ: 14.9 INJECTION, SOLUTION INTRAVENOUS at 01:12

## 2024-01-01 RX ADMIN — NOREPINEPHRINE BITARTRATE 14 MCG/MIN: 1 INJECTION, SOLUTION, CONCENTRATE INTRAVENOUS at 11:43

## 2024-01-01 RX ADMIN — CALCIUM GLUCONATE 2 G: 20 INJECTION, SOLUTION INTRAVENOUS at 14:44

## 2024-01-01 RX ADMIN — ALBUMIN (HUMAN) 12.5 G: 0.25 INJECTION, SOLUTION INTRAVENOUS at 17:10

## 2024-01-01 RX ADMIN — LACTULOSE 200 G: 10 SOLUTION ORAL at 00:29

## 2024-01-01 RX ADMIN — ALBUMIN (HUMAN) 12.5 G: 0.25 INJECTION, SOLUTION INTRAVENOUS at 12:05

## 2024-01-01 RX ADMIN — INSULIN HUMAN 10 UNITS: 100 INJECTION, SOLUTION PARENTERAL at 11:21

## 2024-01-01 RX ADMIN — HALOPERIDOL LACTATE 0.5 MG: 5 INJECTION, SOLUTION INTRAMUSCULAR at 14:36

## 2024-01-01 RX ADMIN — MAGNESIUM SULFATE HEPTAHYDRATE 2 G: 40 INJECTION, SOLUTION INTRAVENOUS at 01:12

## 2024-01-01 RX ADMIN — LACTULOSE 200 G: 10 SOLUTION ORAL at 17:49

## 2024-01-01 RX ADMIN — DEXTROSE MONOHYDRATE 50 ML: 25 INJECTION, SOLUTION INTRAVENOUS at 13:23

## 2024-01-01 RX ADMIN — Medication 100 MCG/HR: at 17:48

## 2024-01-01 RX ADMIN — FENTANYL CITRATE 100 MCG: 50 INJECTION INTRAMUSCULAR; INTRAVENOUS at 06:56

## 2024-01-01 RX ADMIN — OCTREOTIDE ACETATE 50 MCG/HR: 500 INJECTION, SOLUTION INTRAVENOUS; SUBCUTANEOUS at 12:47

## 2024-01-01 RX ADMIN — CEFTRIAXONE SODIUM 1000 MG: 10 INJECTION, POWDER, FOR SOLUTION INTRAVENOUS at 01:15

## 2024-01-01 RX ADMIN — MIDAZOLAM 2 MG: 1 INJECTION INTRAMUSCULAR; INTRAVENOUS at 20:14

## 2024-01-01 RX ADMIN — Medication 1 MG/HR: at 02:04

## 2024-01-01 RX ADMIN — NOREPINEPHRINE BITARTRATE 9 MCG/MIN: 1 INJECTION, SOLUTION, CONCENTRATE INTRAVENOUS at 10:20

## 2024-01-01 RX ADMIN — CALCIUM CHLORIDE 2 G: 100 INJECTION INTRAVENOUS; INTRAVENTRICULAR at 05:57

## 2024-01-01 RX ADMIN — Medication 100 MCG/HR: at 09:51

## 2024-01-01 RX ADMIN — CEFTRIAXONE SODIUM 1000 MG: 10 INJECTION, POWDER, FOR SOLUTION INTRAVENOUS at 13:51

## 2024-01-01 RX ADMIN — PANTOPRAZOLE SODIUM 40 MG: 40 INJECTION, POWDER, FOR SOLUTION INTRAVENOUS at 20:29

## 2024-01-01 RX ADMIN — Medication 2000 UNITS: at 02:58

## 2024-01-01 RX ADMIN — DEXTROSE AND SODIUM CHLORIDE 50 ML/HR: 5; .9 INJECTION, SOLUTION INTRAVENOUS at 13:39

## 2024-01-01 RX ADMIN — METRONIDAZOLE 500 MG: 500 INJECTION, SOLUTION INTRAVENOUS at 20:47

## 2024-01-01 RX ADMIN — ETOMIDATE 20 MG: 2 INJECTION INTRAVENOUS at 05:30

## 2024-01-01 RX ADMIN — CEFTRIAXONE SODIUM 1000 MG: 10 INJECTION, POWDER, FOR SOLUTION INTRAVENOUS at 02:26

## 2024-01-01 RX ADMIN — DEXTROSE AND SODIUM CHLORIDE 50 ML/HR: 5; .9 INJECTION, SOLUTION INTRAVENOUS at 05:56

## 2024-01-01 RX ADMIN — LACTULOSE 200 G: 10 SOLUTION ORAL at 06:00

## 2024-01-01 RX ADMIN — ALBUMIN (HUMAN) 12.5 G: 0.25 INJECTION, SOLUTION INTRAVENOUS at 05:19

## 2024-01-01 RX ADMIN — MIDAZOLAM 2 MG: 1 INJECTION INTRAMUSCULAR; INTRAVENOUS at 05:30

## 2024-01-01 RX ADMIN — FUROSEMIDE 80 MG: 10 INJECTION, SOLUTION INTRAMUSCULAR; INTRAVENOUS at 11:04

## 2024-01-01 RX ADMIN — SODIUM BICARBONATE 125 ML/HR: 84 INJECTION, SOLUTION INTRAVENOUS at 02:57

## 2024-01-01 RX ADMIN — SODIUM BICARBONATE 125 ML/HR: 84 INJECTION, SOLUTION INTRAVENOUS at 13:15

## 2024-01-01 RX ADMIN — LORAZEPAM 1 MG: 2 INJECTION INTRAMUSCULAR; INTRAVENOUS at 14:43

## 2024-01-01 RX ADMIN — OCTREOTIDE ACETATE 50 MCG/HR: 500 INJECTION, SOLUTION INTRAVENOUS; SUBCUTANEOUS at 23:25

## 2024-01-01 RX ADMIN — CHLORHEXIDINE GLUCONATE 0.12% ORAL RINSE 15 ML: 1.2 LIQUID ORAL at 15:00

## 2024-01-01 RX ADMIN — CHLORHEXIDINE GLUCONATE 0.12% ORAL RINSE 15 ML: 1.2 LIQUID ORAL at 08:29

## 2024-01-01 RX ADMIN — MIDAZOLAM 2 MG: 1 INJECTION INTRAMUSCULAR; INTRAVENOUS at 12:32

## 2024-01-01 RX ADMIN — GLYCOPYRROLATE 0.1 MG: 0.2 INJECTION, SOLUTION INTRAMUSCULAR; INTRAVENOUS at 14:24

## 2024-01-01 RX ADMIN — Medication 10 MG/HR: at 11:04

## 2024-01-01 RX ADMIN — CALCIUM GLUCONATE 2 G: 20 INJECTION, SOLUTION INTRAVENOUS at 01:12

## 2024-01-01 RX ADMIN — FENTANYL CITRATE 100 MCG: 50 INJECTION, SOLUTION INTRAMUSCULAR; INTRAVENOUS at 06:56

## 2024-01-01 RX ADMIN — LACTULOSE 200 G: 10 SOLUTION ORAL at 12:48

## 2024-01-01 RX ADMIN — OCTREOTIDE ACETATE 50 MCG/HR: 500 INJECTION, SOLUTION INTRAVENOUS; SUBCUTANEOUS at 09:42

## 2024-01-01 RX ADMIN — OCTREOTIDE ACETATE 50 MCG/HR: 500 INJECTION, SOLUTION INTRAVENOUS; SUBCUTANEOUS at 20:15

## 2024-01-01 RX ADMIN — PANTOPRAZOLE SODIUM 40 MG: 40 INJECTION, POWDER, FOR SOLUTION INTRAVENOUS at 21:32

## 2024-01-01 RX ADMIN — BUMETANIDE 2 MG: 0.25 INJECTION INTRAMUSCULAR; INTRAVENOUS at 18:13

## 2024-01-01 RX ADMIN — VASOPRESSIN 0.04 UNITS/MIN: 20 INJECTION INTRAVENOUS at 19:12

## 2024-01-01 RX ADMIN — PANTOPRAZOLE SODIUM 40 MG: 40 INJECTION, POWDER, FOR SOLUTION INTRAVENOUS at 08:29

## 2024-01-01 RX ADMIN — VECURONIUM BROMIDE 10 MG: 1 INJECTION, POWDER, LYOPHILIZED, FOR SOLUTION INTRAVENOUS at 06:54

## 2024-01-01 RX ADMIN — THIAMINE HYDROCHLORIDE: 100 INJECTION, SOLUTION INTRAMUSCULAR; INTRAVENOUS at 08:45

## 2024-01-01 RX ADMIN — PROPOFOL 40 MG: 10 INJECTION, EMULSION INTRAVENOUS at 14:31

## 2024-01-01 RX ADMIN — Medication 2000 MG: at 01:45

## 2024-01-01 RX ADMIN — VASOPRESSIN 0.04 UNITS/MIN: 20 INJECTION INTRAVENOUS at 10:30

## 2024-01-01 RX ADMIN — Medication 100 MCG/HR: at 03:00

## 2024-01-01 RX ADMIN — SODIUM BICARBONATE 150 MEQ: 84 INJECTION INTRAVENOUS at 05:58

## 2024-01-01 RX ADMIN — ALBUMIN (HUMAN) 12.5 G: 0.25 INJECTION, SOLUTION INTRAVENOUS at 23:43

## 2024-01-01 RX ADMIN — DEXTROSE MONOHYDRATE 25 G: 500 INJECTION PARENTERAL at 11:21

## 2024-01-01 RX ADMIN — ALBUMIN (HUMAN) 50 G: 12.5 INJECTION, SOLUTION INTRAVENOUS at 06:27

## 2024-01-01 RX ADMIN — NOREPINEPHRINE BITARTRATE 6 MCG/MIN: 1 INJECTION, SOLUTION, CONCENTRATE INTRAVENOUS at 07:15

## 2024-01-01 RX ADMIN — THIAMINE HYDROCHLORIDE: 100 INJECTION, SOLUTION INTRAMUSCULAR; INTRAVENOUS at 08:11

## 2024-01-01 RX ADMIN — LACTULOSE 200 G: 10 SOLUTION ORAL at 23:21

## 2024-01-01 RX ADMIN — CALCIUM GLUCONATE 1 G: 20 INJECTION, SOLUTION INTRAVENOUS at 20:20

## 2024-01-01 RX ADMIN — LACTULOSE 200 G: 10 SOLUTION ORAL at 11:53

## 2024-01-01 RX ADMIN — OCTREOTIDE ACETATE 50 MCG/HR: 500 INJECTION, SOLUTION INTRAVENOUS; SUBCUTANEOUS at 02:34

## 2024-01-01 RX ADMIN — THIAMINE HYDROCHLORIDE: 100 INJECTION, SOLUTION INTRAMUSCULAR; INTRAVENOUS at 08:32

## 2024-01-01 RX ADMIN — CEFTRIAXONE SODIUM 1000 MG: 10 INJECTION, POWDER, FOR SOLUTION INTRAVENOUS at 01:49

## 2024-01-01 RX ADMIN — Medication 100 MCG/HR: at 06:18

## 2024-01-01 RX ADMIN — OCTREOTIDE ACETATE 50 MCG/HR: 500 INJECTION, SOLUTION INTRAVENOUS; SUBCUTANEOUS at 05:42

## 2024-01-01 RX ADMIN — LACTULOSE 200 G: 10 SOLUTION ORAL at 16:49

## 2024-01-01 RX ADMIN — Medication 1 MG/HR: at 01:20

## 2024-01-01 RX ADMIN — CEFTRIAXONE SODIUM 1000 MG: 10 INJECTION, POWDER, FOR SOLUTION INTRAVENOUS at 13:40

## 2024-01-01 RX ADMIN — PANTOPRAZOLE SODIUM 40 MG: 40 INJECTION, POWDER, FOR SOLUTION INTRAVENOUS at 21:15

## 2024-01-01 RX ADMIN — PROPOFOL 30 MCG/KG/MIN: 10 INJECTION, EMULSION INTRAVENOUS at 14:35

## 2024-01-01 RX ADMIN — MIDAZOLAM 2 MG: 1 INJECTION INTRAMUSCULAR; INTRAVENOUS at 10:14

## 2024-01-01 RX ADMIN — NOREPINEPHRINE BITARTRATE 6 MCG/MIN: 1 INJECTION, SOLUTION, CONCENTRATE INTRAVENOUS at 16:35

## 2024-01-01 RX ADMIN — LACTULOSE 200 G: 10 SOLUTION ORAL at 05:58

## 2024-01-01 RX ADMIN — ROCURONIUM BROMIDE 50 MG: 10 INJECTION, SOLUTION INTRAVENOUS at 05:31

## 2024-01-01 RX ADMIN — OCTREOTIDE ACETATE 50 MCG/HR: 500 INJECTION, SOLUTION INTRAVENOUS; SUBCUTANEOUS at 03:53

## 2024-01-01 RX ADMIN — CALCIUM GLUCONATE 2 G: 20 INJECTION, SOLUTION INTRAVENOUS at 01:10

## 2024-01-01 RX ADMIN — CHLORHEXIDINE GLUCONATE 0.12% ORAL RINSE 15 ML: 1.2 LIQUID ORAL at 21:32

## 2024-01-01 RX ADMIN — VASOPRESSIN 0.04 UNITS/MIN: 20 INJECTION INTRAVENOUS at 06:26

## 2024-01-01 RX ADMIN — METRONIDAZOLE 500 MG: 500 INJECTION, SOLUTION INTRAVENOUS at 05:18

## 2024-01-01 RX ADMIN — METRONIDAZOLE 500 MG: 500 INJECTION, SOLUTION INTRAVENOUS at 12:20

## 2024-01-01 RX ADMIN — LACTULOSE 200 G: 10 SOLUTION ORAL at 16:52

## 2024-01-01 RX ADMIN — Medication 1 MG/HR: at 10:35

## 2024-01-01 RX ADMIN — NOREPINEPHRINE BITARTRATE 7 MCG/MIN: 1 INJECTION, SOLUTION, CONCENTRATE INTRAVENOUS at 04:07

## 2024-01-01 RX ADMIN — IOHEXOL 100 ML: 350 INJECTION, SOLUTION INTRAVENOUS at 01:44

## 2024-01-01 RX ADMIN — SODIUM CHLORIDE 80 MG: 9 INJECTION, SOLUTION INTRAVENOUS at 03:51

## 2024-01-01 RX ADMIN — POTASSIUM CHLORIDE 20 MEQ: 14.9 INJECTION, SOLUTION INTRAVENOUS at 13:37

## 2024-01-01 RX ADMIN — MORPHINE SULFATE 2 MG: 2 INJECTION, SOLUTION INTRAMUSCULAR; INTRAVENOUS at 14:24

## 2024-01-01 RX ADMIN — NOREPINEPHRINE BITARTRATE 8 MCG: 1 INJECTION, SOLUTION, CONCENTRATE INTRAVENOUS at 14:45

## 2024-01-01 RX ADMIN — MAGNESIUM SULFATE HEPTAHYDRATE 2 G: 40 INJECTION, SOLUTION INTRAVENOUS at 09:11

## 2024-01-01 RX ADMIN — CALCIUM GLUCONATE 2 G: 20 INJECTION, SOLUTION INTRAVENOUS at 10:33

## 2024-01-01 RX ADMIN — CHLORHEXIDINE GLUCONATE 0.12% ORAL RINSE 15 ML: 1.2 LIQUID ORAL at 21:15

## 2024-01-01 RX ADMIN — CEFTRIAXONE SODIUM 1000 MG: 10 INJECTION, POWDER, FOR SOLUTION INTRAVENOUS at 14:43

## 2024-01-01 RX ADMIN — LACTULOSE 200 G: 10 SOLUTION ORAL at 12:25

## 2024-01-01 RX ADMIN — SODIUM BICARBONATE 50 MEQ: 84 INJECTION INTRAVENOUS at 10:10

## 2024-01-01 RX ADMIN — ALBUMIN (HUMAN) 25 G: 12.5 INJECTION, SOLUTION INTRAVENOUS at 00:44

## 2024-01-01 RX ADMIN — Medication 1 MG/HR: at 15:03

## 2024-01-01 RX ADMIN — MAGNESIUM SULFATE HEPTAHYDRATE 2 G: 40 INJECTION, SOLUTION INTRAVENOUS at 07:43

## 2024-01-01 RX ADMIN — DEXTROSE MONOHYDRATE 50 ML: 25 INJECTION, SOLUTION INTRAVENOUS at 01:11

## 2024-01-01 RX ADMIN — ALBUMIN HUMAN 50 G: 50 SOLUTION INTRAVENOUS at 06:27

## 2024-01-01 RX ADMIN — VASOPRESSIN 0.04 UNITS/MIN: 20 INJECTION INTRAVENOUS at 05:14

## 2024-01-01 RX ADMIN — SODIUM CHLORIDE, SODIUM LACTATE, POTASSIUM CHLORIDE, AND CALCIUM CHLORIDE 500 ML: .6; .31; .03; .02 INJECTION, SOLUTION INTRAVENOUS at 01:45

## 2024-01-01 RX ADMIN — CALCIUM GLUCONATE 1 G: 20 INJECTION, SOLUTION INTRAVENOUS at 19:48

## 2024-01-01 RX ADMIN — PHENYLEPHRINE HYDROCHLORIDE 50 MCG/MIN: 10 INJECTION INTRAVENOUS at 06:26

## 2024-01-01 RX ADMIN — MORPHINE SULFATE 2 MG: 2 INJECTION, SOLUTION INTRAMUSCULAR; INTRAVENOUS at 15:09

## 2024-01-01 RX ADMIN — Medication 0.5 MG/HR: at 18:14

## 2024-01-01 RX ADMIN — MORPHINE SULFATE 2 MG: 2 INJECTION, SOLUTION INTRAMUSCULAR; INTRAVENOUS at 16:29

## 2024-01-01 RX ADMIN — LACTULOSE 200 G: 10 SOLUTION ORAL at 05:19

## 2024-01-01 RX ADMIN — PHYTONADIONE 10 MG: 10 INJECTION, EMULSION INTRAMUSCULAR; INTRAVENOUS; SUBCUTANEOUS at 02:54

## 2024-11-16 PROBLEM — E87.29 HIGH ANION GAP METABOLIC ACIDOSIS: Status: ACTIVE | Noted: 2024-01-01

## 2024-11-16 PROBLEM — N17.9 AKI (ACUTE KIDNEY INJURY) (HCC): Status: ACTIVE | Noted: 2024-01-01

## 2024-11-16 PROBLEM — R74.01 TRANSAMINITIS: Status: ACTIVE | Noted: 2024-01-01

## 2024-11-16 PROBLEM — E16.2 HYPOGLYCEMIA: Status: ACTIVE | Noted: 2024-01-01

## 2024-11-16 PROBLEM — D69.6 THROMBOCYTOPENIA (HCC): Status: ACTIVE | Noted: 2024-01-01

## 2024-11-16 PROBLEM — E72.20 HYPERAMMONEMIA (HCC): Status: ACTIVE | Noted: 2024-01-01

## 2024-11-16 PROBLEM — G93.40 ENCEPHALOPATHY: Status: ACTIVE | Noted: 2024-01-01

## 2024-11-16 PROBLEM — D68.9 COAGULOPATHY (HCC): Status: ACTIVE | Noted: 2024-01-01

## 2024-11-16 PROBLEM — K92.2 GI BLEED: Status: ACTIVE | Noted: 2024-01-01

## 2024-11-16 PROBLEM — R57.8 HEMORRHAGIC SHOCK (HCC): Status: ACTIVE | Noted: 2024-01-01

## 2024-11-16 NOTE — H&P
H&P - Critical Care/ICU   Name: Duncan Pearce 59 y.o. male I MRN: [id number]  Unit/Bed#: [facility] I Date of Admission: 11/16/2024   Date of Service: 11/16/2024 I Hospital Day: 0       Assessment & Plan  Hemorrhagic shock (HCC)  Hypotension with Hgb 4.7 on presentation with distended abd and altered mental status.  Code crimson initiated by emergency room.   Cordis placed by ER provider, Arterial line by CC provider.   PRBC-2, FFP-1, platelets en route from [facility].  Vitamin K 10 mg IV, PCC administered.   ETOH abuse  Will administer thiamine, folic acid, multivitamin intravenously daily.  Monitor closely for signs of withdrawal  CIWA protocol when appropriate  ABLA (acute blood loss anemia)  On ER presentation, hemoglobin 4.7 with severe hypotension requiring code Crimson  Continuing to transfuse for hypotension in anticipation of EGD this morning by gastroenterology  No overt losses noted as of yet  Alcoholic cirrhosis (HCC)  Patient with long history of alcoholic cirrhosis, portal hypertension, and multiple varices.  Resultant thrombocytopenia, transaminitis, and hypoglycemia.  Gastroenterology consulted  High anion gap metabolic acidosis  Related to lactic acidosis with lactate 25 on admission.  Continue resuscitation, monitor endpoints  Bicarbonate infusion, consider CRRT if necessary dependent on goals of care.  GLORY (acute kidney injury) (HCC)  Likely multifactorial, to include low flow state, hepatorenal syndrome, dehydration  Aggressive resuscitation  Avoid nephrotoxics and hypotension  Continue to monitor renal indices and urinary output  Transaminitis  Related to alcoholic cirrhosis and likely exacerbated by hypotension  Continue to trend hepatic functions  Thrombocytopenia (HCC)  Likely related to liver disease  Platelet transfusions as needed as part of balance transfusion  Hyperammonemia (HCC)  Ammonia level 449 on admission  N.p.o. at present, given severe portal hypertension would avoid rectal  lactulose until cleared by GI  Gastroenterology consulted  Encephalopathy  Likely multifactorial to include low flow secondary to shock, metabolic encephalopathy, alcoholic encephalopathy  Delirium precautions  Serial neuroexams  Tox screen  Maintain cerebral perfusion with mean arterial pressures greater than 65  Support oxygen carrying capacity and perfusion with transfusions as needed.  Monitor for signs of alcohol withdrawal      GI bleed  Presumptive, per report of patient's girlfriend he has been having tarry stools for a week  Gastroenterology consulted for urgent endoscopy this morning after resuscitation  Every 6 hours CBC with platelet, transfuse for hemoglobin less than 7 or signs of hypoperfusion    Hypoglycemia  Alcoholic cirrhosis likely causing impaired gluconeogenesis  Support glucose as needed  Coagulopathy (HCC)  Related to his alcoholic cirrhosis  Continue to attempt to correct coagulopathy while actively bleeding  Disposition: Critical care    History of Present Illness   Duncan Pearce is a 59 y.o. with past medical history significant for alcohol dependence, alcoholic liver disease, spontaneous bacterial peritonitis who presents to the emergency room after EMS was summoned by his girlfriend due to confusion during a telephone conversation.  Patient was found on the floor, minimally responsive. Of note patient was also hypoglycemic and was administered glucagon 1 mg.  Evaluation in the emergency room was negative for traumatic injury, however patient was noted to be significantly acidotic at 6.93, anemic with a hemoglobin of 4.7, a lactic acid level of 25, and an ammonia level of 449.  On exam his abdomen is distended and firm with absent bowel sounds.  Patient is obtunded on initial exam, hypotensive, and mottled.  Patient's blood pressure continued to fall until femoral pulse was no longer palpable.  Epinephrine 1 mg IV push was given. Code Crimson was called and transfusions were initiated.   Blood pressure improved with restoration of volume, patient became more alert.  Emergency room provider spoke with gastroenterology, with plan to urgently scope after adequate resuscitation.  Patient was started on sodium bicarbonate drip and octreotide drip.  Protonix bolus dose given.  Ceftriaxone for SBP prophylaxis, and Flagyl for possible aspiration.  Critical care was consulted for admission due to hemorrhagic shock with likely GI bleed as the source.    History obtained from chart review and unobtainable from patient due to mental status.  Review of Systems: Review of Systems not obtainable due to Altered mental status    Historical Information   Past Medical History:  No date: ETOH abuse  No date: Liver cirrhosis (HCC) Past Surgical History:  7/3/2017: COLONOSCOPY; N/A      Comment:  Procedure: COLONOSCOPY;  Surgeon: Humberto Butler MD;                 Location: [facility];  Service: Gastroenterology  6/25/2017: ESOPHAGOGASTRODUODENOSCOPY; N/A      Comment:  Procedure: ESOPHAGOGASTRODUODENOSCOPY (EGD);  Surgeon:                Indio Manrique III, MD;  Location: [facility];  Service:               Gastroenterology  12/31/2018: IR PARACENTESIS   No current outpatient medications No Known Allergies   Social History     Tobacco Use    Smoking status: Every Day     Current packs/day: 1.00     Average packs/day: 1 pack/day for 25.0 years (25.0 ttl pk-yrs)     Types: Cigarettes    Smokeless tobacco: Never   Substance Use Topics    Alcohol use: Yes     Comment: vodka -approx one bottle every nite.     Drug use: No    History reviewed. No pertinent family history.       Objective :                   Vitals I/O      Most Recent Min/Max in 24hrs   Temp (!) 93.7 °F (34.3 °C) Temp  Min: 92 °F (33.3 °C)  Max: 93.7 °F (34.3 °C)   Pulse 96 Pulse  Min: 88  Max: 104   Resp (!) 24 Resp  Min: 19  Max: 26   BP 97/54 BP  Min: 74/38  Max: 186/90   O2 Sat 100 % SpO2  Min: 92 %  Max: 100 %      Intake/Output Summary (Last 24 hours)  at 11/16/2024 0455  Last data filed at 11/16/2024 0125  Gross per 24 hour   Intake --   Output 200 ml   Net -200 ml       Diet NPO    Invasive Monitoring           Physical Exam   Physical Exam  Vitals and nursing note reviewed.   Eyes:      General: Scleral icterus present.   Skin:     General: Skin is cool and mottled extremities.      Capillary Refill: Capillary refill takes 2 to 3 seconds.      Coloration: Skin is jaundiced.   HENT:      Head: Normocephalic and atraumatic.   Neck:      Vascular: JVD present.   Cardiovascular:      Rate and Rhythm: Regular rhythm. Tachycardia present.      Pulses: Decreased pulses.      Heart sounds: Murmur heard.   Musculoskeletal:      Right lower leg: Trace Edema present.      Left lower leg: Trace Edema present.   Abdominal: General: Abdomen is protuberant. Bowel sounds are absent. There is distension.There is no abdominal bruit.      Palpations: Abdomen is rigid. There is no pulsatile mass.      Tenderness: There is no guarding or rebound.   Constitutional:       Appearance: He is toxic-appearing.   Pulmonary:      Effort: Tachypnea present. No accessory muscle usage, respiratory distress or accessory muscle usage.      Breath sounds: Rhonchi present.   Secretions are normal.Chest:      Chest wall: No tenderness.   Neurological:      Mental Status: He is somnolent and unresponsive.      GCS: GCS eye subscore is 1. GCS verbal subscore is 1. GCS motor subscore is 4.      Motor: Weakness. No tremor or seizure activity.        No clonus.      Comments: GCS improving with perfusion to spontaneous eye-opening confused conversation and following commands   Genitourinary/Anorectal:  Bernal present.        Diagnostic Studies        Lab Results: I have reviewed the following results:     Medications:  Scheduled PRN   cefTRIAXone, 1,000 mg, Q12H  chlorhexidine, 15 mL, Q12H STACI  folic acid 1 mg, thiamine (VITAMIN B1) 100 mg in sodium chloride 0.9 % 100 mL IV piggyback, ,  Daily  metoclopramide, 10 mg, Once  metroNIDAZOLE, 500 mg, Q8H          Continuous    octreotide, 50 mcg/hr, Last Rate: 50 mcg/hr (11/16/24 7953)  sodium bicarbonate 150 mEq in dextrose 5 % 1,000 mL infusion, 125 mL/hr, Last Rate: 125 mL/hr (11/16/24 5427)         Labs:   CBC    Recent Labs     11/16/24 0129   WBC 22.26*   HGB 4.7*   HCT 19.5*   *   BANDSPCT 20*     BMP    Recent Labs     11/16/24 0129   SODIUM 139   K 4.9   CL 96   CO2 11*   AGAP 32*   BUN 29*   CREATININE 1.77*   CALCIUM 8.0*       Coags    Recent Labs     11/16/24 0129   INR 3.69*   PTT 40*        Additional Electrolytes  Recent Labs     11/16/24 0129   MG 1.7*          Blood Gas    No recent results  Recent Labs     11/16/24 0129   PHVEN 6.939*   BVL2EVB 16.9*   PO2VEN 169.1*   STI3NWM 3.5*   BEVEN -26.2   U5ENGFB 89.8*    LFTs  Recent Labs     11/16/24 0129   *   AST 4,335*   ALKPHOS 304*   ALB 2.7*   TBILI 4.53*       Infectious  No recent results  Glucose  Recent Labs     11/16/24  0129   GLUC 73

## 2024-11-16 NOTE — ED NOTES
Code crimson called to 1832. Blood bank called to advise.      Sabine Pritchett RN  11/16/24 0156

## 2024-11-16 NOTE — PROCEDURES
Arterial Line Insertion    Date/Time: 11/16/2024 2:10 AM    Performed by: IRAJ Montes  Authorized by: IRAJ Montes    Patient location:  ED  Other Assisting Provider: No    Consent:     Consent obtained:  Emergent situation  Universal protocol:     Patient identity confirmed:  Anonymous protocol, patient vented/unresponsive  Indications:     Indications: hemodynamic monitoring, multiple ABGs, continuous blood pressure monitoring and frequent labs / infusion    Pre-procedure details:     Skin preparation:  Chlorhexidine  Anesthesia (see MAR for exact dosages):     Anesthesia method:  None  Procedure details:     Location / Tip of Catheter:  Radial    Laterality:  Right    Needle gauge:  20 G    Placement technique:  Seldinger    Number of attempts:  1    Successful placement: yes      Transducer: waveform confirmed    Post-procedure details:     Post-procedure:  Sutured and sterile dressing applied    CMS:  Unchanged    Patient tolerance of procedure:  Tolerated well, no immediate complications

## 2024-11-16 NOTE — ASSESSMENT & PLAN NOTE
Hypotension with Hgb 4.7 on presentation with distended abd and altered mental status.  Code crimson initiated by emergency room.   Cordis placed by ER provider, Arterial line by CC provider.   PRBC-2, FFP-1, platelets en route from [facility].  Vitamin K 10 mg IV, PCC administered.

## 2024-11-16 NOTE — PROCEDURES
Arterial Line Insertion    Date/Time: 11/16/2024 2:00 AM    Performed by: IARJ Montes  Authorized by: IRAJ Montes    Patient location:  ED  Consent:     Consent obtained:  Emergent situation  Universal protocol:     Patient identity confirmed:  Anonymous protocol, patient vented/unresponsive  Indications:     Indications: hemodynamic monitoring, multiple ABGs, continuous blood pressure monitoring and frequent labs / infusion    Pre-procedure details:     Skin preparation:  Chlorhexidine    Preparation: Patient was prepped and draped in sterile fashion    Anesthesia (see MAR for exact dosages):     Anesthesia method:  None  Procedure details:     Location / Tip of Catheter:  Femoral    Laterality:  Right    Needle gauge:  18 G    Placement technique:  Seldinger    Number of attempts:  2    Successful placement: no    Comments:      One stick, unable to pass wire.  Pulsatile flow lost while procedure in progress-epi 1 mg given with restoration of pulsatile flow

## 2024-11-16 NOTE — PROCEDURES
Intubation    Date/Time: 11/16/2024 5:45 AM    Performed by: IRAJ Montes  Authorized by: IRAJ Montes    Patient location:  Bedside  Consent:     Consent obtained:  Emergent situation  Universal protocol:     Patient identity confirmed:  Anonymous protocol, patient vented/unresponsive  Pre-procedure details:     Patient status:  Altered mental status    Mallampati score:  3    Pretreatment medications:  Midazolam and etomidate    Paralytics:  Rocuronium  Indications:     Indications for intubation: respiratory failure and airway protection    Procedure details:     Preoxygenation:  Bag valve mask    Intubation method:  Oral    Oral intubation technique:  Glidescope    Laryngoscope blade:  Mac 3    Tube size (mm):  8.0    Tube type:  Cuffed and hi-lo    Number of attempts:  2    Ventilation between attempts: yes      Cricoid pressure: no      Tube visualized through cords: yes    Placement assessment:     Tube secured with:  ETT irizarry    Placement verification: chest rise, CXR verification and ETCO2 detector      CXR findings:  ETT in proper place  Post-procedure details:     Patient tolerance of procedure:  Tolerated well, no immediate complications           (0) independent

## 2024-11-16 NOTE — ASSESSMENT & PLAN NOTE
Presumptive, per report of patient's girlfriend he has been having tarry stools for a week  Gastroenterology consulted for urgent endoscopy this morning after resuscitation  Every 6 hours CBC with platelet, transfuse for hemoglobin less than 7 or signs of hypoperfusion

## 2024-11-16 NOTE — PROCEDURES
Central Line Insertion    Date/Time: 11/16/2024 4:00 PM    Performed by: IRAJ Ching  Authorized by: IRAJ Ching    Patient location:  Bedside and ICU  Other Assisting Provider: Yes (comment) (Dr. Emmanuel)    Universal protocol:     Patient identity confirmed:  Hospital-assigned identification number  Pre-procedure details:     Hand hygiene: Hand hygiene performed prior to insertion      Sterile barrier technique: All elements of maximal sterile technique followed      Skin preparation:  2% chlorhexidine    Skin preparation agent: Skin preparation agent completely dried prior to procedure    Indications:     Central line indications: medications requiring central line      Central line indications comment:  Poor peripheral access  Procedure details:     Location:  Right internal jugular    Catheter size:  7 Fr  Post-procedure details:     Post-procedure:  Line sutured    Assessment:  Blood return through all ports and free fluid flow  Comments:      Placed triple lumen through Cordis introducer

## 2024-11-16 NOTE — QUICK NOTE
Sterilely placed a triple lumen catheter through Cortis introducer. All three lumens flushed with saline and pull back blood easily. One suture placed to ensure stable placement of catheter. A new sterile dressing placed.

## 2024-11-16 NOTE — ASSESSMENT & PLAN NOTE
Related to alcoholic cirrhosis and likely exacerbated by hypotension  Continue to trend hepatic functions

## 2024-11-16 NOTE — Clinical Note
Case was discussed with Dr Emmanuel and the patient's admission status was agreed to be Admission Status: inpatient status to the service of Dr. Emmanuel.

## 2024-11-16 NOTE — ED NOTES
Request sent to Pharmacy re: Bicarb 150 mEq in dextrose 5% 1000 mL infusion, and Kcentra 2000 units. Awaiting delivery.     Doc Mortensen RN  11/16/24 3626

## 2024-11-16 NOTE — ASSESSMENT & PLAN NOTE
Likely multifactorial, to include low flow state, hepatorenal syndrome, dehydration  Aggressive resuscitation  Avoid nephrotoxics and hypotension  Continue to monitor renal indices and urinary output

## 2024-11-16 NOTE — ED NOTES
RBCs x 2units received and rapid infused as per order; unit #1; O POS, not crossmatched, Blood product: [id number]  RBC, Leukoreduced, order #[id number], unit #[id number], volume 350 mL, exp date:12/3/24 23:59. Unit #2;  O POS, not crossmatched, Blood product: [id number]  RBC, Leukoreduced, order #[id number], unit #[id number], volume 350 mL, exp date: 12/5/24 23:59. FFP x1 unit received and infused as per order; unit #1; A POS, unit #[id number], product: [id number], volume 312 mL, exp date: 11/22/24 23:59.      Doc Mortensen RN  11/16/24 0537

## 2024-11-16 NOTE — PROGRESS NOTES
Progress Note - Gastroenterology   Name: Duncan Fan y.o. male I MRN: [id number]  Unit/Bed#: ICU 11 I Date of Admission: 11/16/2024   Date of Service: 11/16/2024 I Hospital Day: 0  Progress Note  Duncan Fan y.o. male MRN: [id number]  Unit/Bed#: ICU 11 Encounter: [id number]    Assessment & Plan    Hemorrhagic shock  Variceal bleed  Alcohol cirrhosis  Ischemic hepatitis/lactic acidosis    - Patient has a hx of decompensated alcohol cirrhosis complicated by HE, ascites who was admitted with hemorrhagic shock with hypotension and HGB of 4.7 on presentation.  - Code Crimson was initiated in the ED.  - He was also found to have significantly elevated LFTs c/w ischemic hepatitis and a severe lactic acidosis.  - Emergent EGD was performed this morning. EGD showed 4 columns of grade 3 esophageal varices with active bleeding s/p banding x 6 and a large amount of blood in the stomach.  - HGB is up to 10.3 now this am. Lactic acid is trending down from 25.2 to 16.8.  - He is currently intubated/sedated in the ICU and on vasopressors and brenda hugger at this time.  - MELD 3.0: 28 at 11/16/2024  9:17 AM. MELD-Na: 29 at 11/16/2024  9:17 AM.  - Continue Octreotide gtt.  - Pantoprazole gtt.  - Ceftriaxone for SBP prophylaxis.  - Abdominal ultrasound and doppler ordered.  - Monitor patient and labs. ICU care.  - He will require a repeat EGD on Monday.      Subjective:  Patient is currently intubated/sedated in the ICU.  He is on Vasopressor and brenda hugger. HGB at 9 was 10.3.  No melena/bowel movements per nurse.    Objective:     Vitals:   Vitals:    11/16/24 1021   BP:    Pulse:    Resp:    Temp:    SpO2: 90%   ,Body mass index is 23.68 kg/m².      Intake/Output Summary (Last 24 hours) at 11/16/2024 1129  Last data filed at 11/16/2024 1011  Gross per 24 hour   Intake 4252.72 ml   Output 500 ml   Net 3752.72 ml       Physical Exam:     General Appearance: Intubated/sedated  Lungs: Clear to auscultation bilaterally,  no rales or rhonchi, no labored breathing/accessory muscle use  Heart: Regular rate and rhythm, S1, S2 normal, no murmur, click, rub or gallop  Abdomen: Soft, non-tender, distended; bowel sounds normal; no masses or no organomegaly  Extremities: No cyanosis    Invasive Devices       Central Venous Catheter Line  Duration             CVC Central Lines 11/16/24 Single Right Internal jugular <1 day              Peripheral Intravenous Line  Duration             Peripheral IV 11/16/24 Distal;Left;Ventral (anterior) Forearm <1 day    Peripheral IV 11/16/24 Dorsal (posterior);Right Hand <1 day    Peripheral IV 11/16/24 Left Antecubital <1 day    Peripheral IV 11/16/24 Left;Proximal;Ventral (anterior) Forearm <1 day    Peripheral IV 11/16/24 Lower;Proximal;Right;Ventral (anterior) Saphenous <1 day              Arterial Line  Duration             Arterial Line 11/16/24 Radial <1 day    Arterial Line 11/16/24 Right Radial <1 day              Drain  Duration             Urethral Catheter Straight-tip;Temperature probe 16 Fr. <1 day              Airway  Duration             ETT  Hi-Lo;Cuffed 8 mm <1 day                    Lab Results:  No results displayed because visit has over 200 results.          Imaging Studies:   I have personally reviewed pertinent imaging studies.    EGD  Result Date: 11/16/2024  Narrative: Table formatting from the original result was not included. [facility], [facility] [address] [place] [id number] [phone] DATE OF SERVICE: 11/16/24 PHYSICIAN(S): Attending: Indio Manrique III, MD Fellow: No Staff Documented INDICATION: Upper GI bleed POST-OP DIAGNOSIS: See the impression below. PREPROCEDURE: Informed consent was obtained for the procedure, including sedation.  Risks of perforation, hemorrhage, adverse drug reaction and aspiration were discussed. The patient was placed in the left lateral decubitus position. Patient was explained about the risks and benefits of the  procedure. Risks including but not limited to bleeding, infection, and perforation were explained in detail. Also explained about less than 100% sensitivity with the exam and other alternatives. PROCEDURE: EGD DETAILS OF PROCEDURE: Patient was taken to the procedure room where a time out was performed to confirm correct patient and correct procedure. The patient underwent monitored anesthesia care, which was administered by an anesthesia professional. The patient's blood pressure, heart rate, level of consciousness, respirations, oxygen, ECG and ETCO2 were monitored throughout the procedure. The scope was introduced through the mouth and advanced to the second part of the duodenum. Retroflexion was performed in the fundus. The patient experienced no blood loss. The procedure was not difficult. The patient tolerated the procedure well. There were no apparent adverse events. ANESTHESIA INFORMATION: ASA: ASA status not filed in the log. Anesthesia Type: Anesthesia type not filed in the log. MEDICATIONS: fentanyl citrate (PF) 100 MCG/2ML **ADS Override Pull** 100 mcg (Totals for administrations occurring from 0654 to 0716 on 11/16/24) FINDINGS: Multiple large grade III varices in the middle third of the esophagus and lower third of the esophagus; there was spurting blood; placed 6 bands successfully. The site of variceal bleed in the distal esophagus was banded and hemostasis was achieved Significant amount of fresh blood and blood clotting in the stomach, which obscured visualization. The stomach was poorly examined due to the presence of blood SPECIMENS: * No specimens in log *     Impression: 4 columns of grade 3 esophageal varices with active variceal hemorrhage status post banding x 6 with hemostasis achieved A large amount of blood in the stomach prevented adequate examination RECOMMENDATION: Continue octreotide drip Continue Protonix drip Antibiotics for primary prophylaxis of SBP No NGT He will require another  endoscopy on Monday   Indio Manrique III, MD     Paracentesis  Result Date: 11/16/2024  Narrative: Chuyita Galvan MD     11/16/2024  9:58 AM Paracentesis Date/Time: 11/16/2024 6:30 AM Performed by: Chuyita Galvan MD Authorized by: Chuyita Galvan MD  Patient location:  ICU Other Assisting Provider: No  Consent:   Consent obtained:  Emergent situation Universal protocol:   Patient identity confirmed:  Arm band Pre-procedure details:   Initial or Subsequent Exam:  Initial   Procedure purpose:  Diagnostic   Indications: other (comment)    Indications comment:  Evaluate for hemorrhagic ascites   Preparation: Patient was prepped and draped in usual sterile fashion  Anesthesia (see MAR for exact dosages):   Anesthesia method:  None Procedure details:   Needle gauge:  20   Ultrasound guidance: yes    Ultrasound image availability:  Not saved   Sterile ultrasound techniques: Sterile gel and sterile probe covers were used    Approach:  Percutaneous   Puncture site:  L lower quadrant   Fluid removed amount:  5 cc   Fluid appearance:  Yellow   Dressing:  Adhesive bandage Post-procedure details:   Patient tolerance of procedure:  Tolerated well, no immediate complications Post-procedure medication (see MAR for dosing):   Albumin replacement: No      TRAUMA - CT chest abdomen pelvis w contrast  Result Date: 11/16/2024  Narrative: CT CHEST, ABDOMEN AND PELVIS WITH IV CONTRAST INDICATION: TRAUMA. COMPARISON: 12/30/2018 TECHNIQUE: CT examination of the chest, abdomen and pelvis was performed. Multiplanar 2D reformatted images were created from the source data. This examination, like all CT scans performed in the [facility], was performed utilizing techniques to minimize radiation dose exposure, including the use of iterative reconstruction and automated exposure control. Radiation dose length product (DLP) for this visit: 1058 mGy-cm IV Contrast: 100 mL of iohexol  (OMNIPAQUE) Enteric Contrast: Not administered. FINDINGS: CHEST Exam limited by motion artifact. LUNGS: Central airways are patent. No tracheal or endobronchial lesion. Mild pleural-parenchymal scarring in the right lung apex. Nonspecific subpleural 4 mm nodule in the right lung apex. 1.5 cm nonspecific nodular density in the left lower lobe. Additional subcentimeter subpleural nodular densities in the left lower lobe. No lobar airspace consolidation. Extensive dependent atelectasis and/or early airspace disease in the bilateral lung bases. PLEURA: No pneumothorax or pleural effusions HEART/GREAT VESSELS: Heart is unremarkable for patient's age. No pericardial effusion. No thoracic aortic aneurysm/dissection. No evidence of acute vascular injury. Visualized proximal thoracic vessels are patent with normal course and caliber. Scattered  calcific atherosclerosis of the thoracic aorta and proximal thoracic great vessels. MEDIASTINUM AND GERI: Unremarkable without mediastinal mass/hematoma, fluid collection, or lymphadenopathy. The visualized thyroid glands unremarkable. Esophagus is distended with air and fluid. Paraesophageal varices along the mid to distal esophagus noted likely sequelae of portal hypertension. CHEST WALL AND LOWER NECK: Unremarkable. ABDOMEN LIVER/BILIARY TREE: Severe diffuse hepatic steatosis. Shrunken nodular contour of the liver also seen suggesting changes of advanced cirrhosis. An area of increased attenuation along the left hepatic lobe and anterior right hepatic lobe may represent focal fibrosis. No suspicious liver mass identified. No biliary dilation. GALLBLADDER: Contracted without abnormal wall thickening or calcified gallstones. No pericholecystic inflammatory change. SPLEEN: Unremarkable. PANCREAS: Unremarkable. ADRENAL GLANDS: Unremarkable. KIDNEYS/URETERS: No evidence of acute renal injury. No hydronephrosis or urinary tract calculi. Subcentimeter hypoattenuating right renal lesion,  too small to characterize but statistically likely benign, which do not warrant follow-up (Radiology June 2019). STOMACH AND BOWEL: Stomach is markedly distended with fluid and debris. No gross intraluminal mass. Small and large bowel loops are normal in course and caliber without evidence for obstruction. Mild diffuse wall thickening of multiple abdominal/pelvic small bowel loops and diffuse wall thickening of the colon noted which could suggest a diffuse infectious/inflammatory enterocolitis versus hepatic enterocolopathy. Normal appendix with inflammatory changes. APPENDIX: No findings to suggest appendicitis. ABDOMINOPELVIC CAVITY: Moderate to large amount of abdominal/pelvic ascites. No pneumoperitoneum. No lymphadenopathy by size criteria. VESSELS: Extensive calcific atherosclerosis of the aortoiliac arteries. Infrarenal abdominal aortic aneurysm measuring 3.5 x 3.3 cm noted. No evidence for aortic dissection. Recommend follow-up imaging every 3 years per current guidelines. Markedly dilated recanalized umbilical vein extending along the ventral abdominal wall with multiple dilated superficial abdominal varices are seen, increased in size compared to the prior study. Findings likely related to sequelae of portal hypertension. PELVIS REPRODUCTIVE ORGANS: Unremarkable for patient's age. URINARY BLADDER: Decompressed with Bernal catheter in place. ABDOMINAL WALL/INGUINAL REGIONS: Unremarkable. BONES: No gross acute displaced fracture or subluxation. Subtle nondisplaced fracture may not be evident on this exam due to motion artifact. No suspicious osseous lesion. Multilevel degenerative changes of the thoracolumbar spine, worse at the L1-2 and L5-S1 levels.     Impression: 1.  Exam limited by motion artifact. Cannot exclude subtle nondisplaced fractures given motion. Otherwise no acute displaced fracture or subluxation seen. 2.  Nonspecific subpleural 4 mm nodule in the right lung apex. 1.5 cm nonspecific nodular  density in the left lower lobe. Additional subcentimeter subpleural nodular densities in the left lower lobe. Extensive dependent atelectasis and/or early airspace disease in the bilateral lung bases. 3.  Esophagus is distended with air and fluid. Paraesophageal varices along the mid to distal esophagus noted likely sequelae of portal hypertension. 4.  Sequelae of hepatic cirrhosis, steatosis, and portal hypertension. 5.  Markedly dilated recanalized umbilical vein extending along the ventral abdominal wall with multiple dilated superficial abdominal varices are seen, increased in size compared to the prior study. Findings likely related to sequelae of portal hypertension. 6.  No evidence of bowel obstruction or free air. Moderate to large amount of abdominal/pelvic ascites. 7.  Infrarenal abdominal aortic aneurysm measuring 3.5 x 3.3 cm noted. No evidence for aortic dissection. Recommend follow-up imaging every 3 years per current guidelines. 8.  No evidence of intrathoracic or intra-abdominal/pelvic visceral injury or vascular injury. 9.  Mild diffuse wall thickening of multiple abdominal/pelvic small bowel loops and diffuse wall thickening of the colon noted which could suggest a diffuse infectious/inflammatory enterocolitis versus hepatic enterocolopathy. Workstation performed: [id number]     TRAUMA - CT spine cervical wo contrast  Result Date: 11/16/2024  Narrative: CT CERVICAL SPINE - WITHOUT CONTRAST INDICATION:   TRAUMA. COMPARISON:  None. TECHNIQUE:  CT examination of the cervical spine was performed without intravenous contrast.  Contiguous axial images were obtained. Multiplanar 2D reformatted images were created from the source data. Radiation dose length product (DLP) for this visit:  479 mGy-cm .  This examination, like all CT scans performed in the [facility], was performed utilizing techniques to minimize radiation dose exposure, including the use of iterative reconstruction and  automated exposure control. IMAGE QUALITY:  Diagnostic. FINDINGS: ALIGNMENT:  Normal alignment of the cervical spine. No subluxation. VERTEBRAE:  No acute cervical spine fracture. Vertebral body heights are preserved. DEGENERATIVE CHANGES:  Moderate multilevel cervical degenerative changes are noted. No critical central canal stenosis. PREVERTEBRAL AND PARASPINAL SOFT TISSUES: Unremarkable THORACIC INLET: No pneumothorax, pleural effusions, or airspace consolidation/pulmonary contusions. Mild right apical pleural-parenchymal scarring.     Impression: Multilevel degenerative changes without acute cervical spine fracture or traumatic malalignment. Workstation performed: [id number]     TRAUMA - CT head wo contrast  Result Date: 11/16/2024  Narrative: CT BRAIN - WITHOUT CONTRAST INDICATION:   TRAUMA. COMPARISON: 6/24/2017 TECHNIQUE:  CT examination of the brain was performed.  Multiplanar 2D reformatted images were created from the source data. Radiation dose length product (DLP) for this visit:  862 mGy-cm .  This examination, like all CT scans performed in the [facility], was performed utilizing techniques to minimize radiation dose exposure, including the use of iterative reconstruction and automated exposure control. IMAGE QUALITY:  Diagnostic. FINDINGS: PARENCHYMA:No intracranial mass, mass effect or midline shift. No CT signs of acute infarction.  No acute parenchymal hemorrhage. VENTRICLES AND EXTRA-AXIAL SPACES: Mild central and frontal predominant cortical volume loss/atrophy. Basilar cisterns are patent and unremarkable. VISUALIZED ORBITS: Normal visualized orbits. PARANASAL SINUSES: Normal visualized paranasal sinuses. CALVARIUM AND EXTRACRANIAL SOFT TISSUES: Unremarkable     Impression: No acute intracranial hemorrhage or depressed calvarial fracture. Workstation performed: [id number]       Payal Felix PA-C  11/16/2024,11:29 AM

## 2024-11-16 NOTE — RESPIRATORY THERAPY NOTE
RT Ventilator Management Note  Duncan Pearce 59 y.o. male MRN: [id number]  Unit/Bed#: ICU 11 Encounter: [id number]      Daily Screen         11/16/2024  0556 11/16/2024 0814          Patient safety screen outcome:: Failed Failed      Not Ready for Weaning due to:: Underline problem not resolved Underline problem not resolved                Physical Exam:   Assessment Type: Assess only  General Appearance: Sedated  Respiratory Pattern: Assisted  Chest Assessment: Chest expansion symmetrical  Bilateral Breath Sounds: Diminished  Cough: None  Suction: ET Tube  O2 Device: vent  Subjective Data: sedated      Resp Comments: pt remains intubated and on full mechanical support  no changes at this time  skin integrity remains intact       11/16/24 0814   Respiratory Assessment   Assessment Type Assess only   General Appearance Sedated   Respiratory Pattern Assisted   Chest Assessment Chest expansion symmetrical   Bilateral Breath Sounds Diminished   Suction ET Tube   Resp Comments pt remains intubated and on full mechanical support  no changes at this time  skin integrity remains intact   O2 Device vent   Subjective Data sedated   Vent Information   Vent ID [id number]   Vent type Bella C3   Bella Vent Mode APVcmv   Is the patient reintubated? No   $ Pulse Oximetry Spot Check Charge Completed   SpO2 95 %   APVcmv Settings   Resp Rate (BPM) 24 BPM   VT (mL) 450 mL   %TI (%) 1 %   FIO2 (%) 40 %   PEEP (cmH2O) 6 cmH2O   I:E Ratio 1:1.5   Insp Resistance 11   Flow Trigger (LPM) 2 LPM   Humidification Heat and moisture exchanger   Heater Temp 98.6 °F (37 °C)   APVcmv Actuals   Resp Rate (BPM) 24 BPM   VT (mL) 446 mL   MV (Obs) 10.8   Peak Pressure (cmH2O) 22 cmH2O   I:E Ratio (Obs) 1:1.5   Static Compliance (mL/cmH20) 38.6 mL/cmH2O   Plateau Pressure (cm H2O) 8.6 cm H2O   Heater Temperature (Obs) 98.6 °F (37 °C)   APVcmv ALARMS   High Peak Pressure (cmH2O) 45 cmH2O   Low Peak Pressure (cmH2O) 5 cm H2O   High Resp Rate (BPM) 40  BPM   High MV (L/min) 15 L/min   Low MV (L/min) 2 L/min   High VT (mL) 700 mL   Low VT (mL) 200 mL   Apnea Time (s) 20 S   Daily Screen   Patient safety screen outcome: Failed   Not Ready for Weaning due to: Underline problem not resolved   IHI Ventilator Associated Pneumonia Bundle   Daily Assessment of Readiness to Extubate Yes   Head of Bed Elevated HOB 30   Oral Care Mouth suctioned   ETT  Hi-Lo;Cuffed 8 mm   Placement Date/Time: 11/16/24 (c) 7986   Type: Hi-Lo;Cuffed  Tube Size: 8 mm  Location: Oral  Insertion attempts: 2  Placement Verification: Chest x-ray;End tidal CO2   Secured at (cm) 24   Measured from Teeth   Secured Location Right   Repositioned (S)  Right to Center   Secured by Commercial tube irizarry   Site Condition Edema   Cuff Pressure (color) Green   HI-LO Suction  Continuous low suction   HI-LO Secretions Blood tinged;Moderate   HI-LO Intervention Flushed

## 2024-11-16 NOTE — RESPIRATORY THERAPY NOTE
RT Ventilator Management Note  Duncan Jackcrmichelle 59 y.o. male MRN: [id number]  Unit/Bed#: ICU 11 Encounter: [id number]      Daily Screen         11/16/2024  0556 11/16/2024  0814          Patient safety screen outcome:: Failed Failed      Not Ready for Weaning due to:: Underline problem not resolved Underline problem not resolved                Physical Exam:   Assessment Type: Assess only  General Appearance: Sedated  Respiratory Pattern: Assisted  Chest Assessment: Chest expansion symmetrical  Bilateral Breath Sounds: Diminished  Cough: None  Suction: ET Tube  O2 Device: vent  Subjective Data: sedated      Resp Comments: pt remains intubated increased fio2 to 80% and peep to 8       11/16/24 1451   Respiratory Assessment   Assessment Type Assess only   General Appearance Sedated   Respiratory Pattern Assisted   Chest Assessment Chest expansion symmetrical   Bilateral Breath Sounds Diminished   Suction ET Tube   Resp Comments pt remains intubated increased fio2 to 80% and peep to 8   O2 Device vent   Subjective Data sedated   Vent Information   Vent ID [id number]   Vent type Bella C3   $ Pulse Oximetry Spot Check Charge Completed   SpO2 96 %   APVcmv Settings   Resp Rate (BPM) 24 BPM   VT (mL) 450 mL   Insp Time (sec) 1 sec   FIO2 (%) (S)  80 %   PEEP (cmH2O) 8 cmH2O   I:E Ratio 1:1.5   Insp Resistance 13   Flow Trigger (LPM) 2 LPM   Humidification Heater   Heater Temp 98.6 °F (37 °C)   APVcmv Actuals   Resp Rate (BPM) 26 BPM   VT (mL) 438 mL   MV (Obs) 10.6   Peak Pressure (cmH2O) 26 cmH2O   I:E Ratio (Obs) 1:1.5   Static Compliance (mL/cmH20) 34.1 mL/cmH2O   Plateau Pressure (cm H2O) 8.6 cm H2O   Heater Temperature (Obs) 98.6 °F (37 °C)   APVcmv ALARMS   High Peak Pressure (cmH2O) 45 cmH2O   Low Peak Pressure (cmH2O) 5 cm H2O   High Resp Rate (BPM) 40 BPM   High MV (L/min) 15 L/min   Low MV (L/min) 2 L/min   High VT (mL) 700 mL   Low VT (mL) 200 mL   Maintenance   Alarm (pink) cable attached No   Resuscitation bag  with peep valve at bedside Yes   Water bag changed No   Circuit changed No   ETT  Hi-Lo;Cuffed 8 mm   Placement Date/Time: 11/16/24 (c) 0262   Type: Hi-Lo;Cuffed  Tube Size: 8 mm  Location: Oral  Insertion attempts: 2  Placement Verification: Chest x-ray;End tidal CO2   Secured at (cm) 24   Measured from Teeth   Secured Location Center   Repositioned (S)  Center to Left   Secured by Commercial tube irizarry   Site Condition Dry   Cuff Pressure (color) Green   HI-LO Suction  Continuous low suction   HI-LO Secretions Moderate;Blood tinged   HI-LO Intervention Patent

## 2024-11-16 NOTE — ASSESSMENT & PLAN NOTE
Related to lactic acidosis with lactate 25 on admission.  Continue resuscitation, monitor endpoints  Bicarbonate infusion, consider CRRT if necessary dependent on goals of care.

## 2024-11-16 NOTE — RESPIRATORY THERAPY NOTE
11/16/24 0556   Respiratory Assessment   Assessment Type Assess only   General Appearance Sedated   Respiratory Pattern Assisted   Chest Assessment Chest expansion symmetrical   Bilateral Breath Sounds Clear   Cough None   Suction ET Tube;Oral   Resp Comments Pt was successfully intubated with a 7.5 ET tube 24@ teeth. Pt was placed on full vent support with proper settings.   O2 Device Vent   Vent Information   Vent ID [id number]   Vent type Bella C3   Bella Vent Mode APVcmv   $ Vent Charge-INITIAL Yes   Ventilator Start Yes   Is the patient reintubated? No   $ Vent Daily Charge-Subsequent Yes   $ Pulse Oximetry Spot Check Charge Completed   SpO2 98 %   APVcmv Settings   Resp Rate (BPM) 24 BPM   VT (mL) 450 mL   %TI (%) 0.8 %   FIO2 (%) 40 %   PEEP (cmH2O) 6 cmH2O   I:E Ratio 1.5   Humidification Heater   Heater Temp 98.6 °F (37 °C)   APVcmv Actuals   Resp Rate (BPM) 24 BPM   VT (mL) 437 mL   MV (Obs) 10.4   Peak Pressure (cmH2O) 19 cmH2O   APVcmv ALARMS   High Peak Pressure (cmH2O) 45 cmH2O   Low Peak Pressure (cmH2O) 5 cm H2O   High Resp Rate (BPM) 40 BPM   High MV (L/min) 15 L/min   Low MV (L/min) 2 L/min   High VT (mL) 700 mL   Low VT (mL) 200 mL   Apnea Time (s) 20 S   Maintenance   Alarm (pink) cable attached No   Resuscitation bag with peep valve at bedside Yes   Circuit changed No   Daily Screen   Patient safety screen outcome: Failed   Not Ready for Weaning due to: Underline problem not resolved   ETT  Hi-Lo;Cuffed 8 mm   Placement Date/Time: 11/16/24 (c) 4595   Type: Hi-Lo;Cuffed  Tube Size: 8 mm  Location: Oral  Insertion attempts: 2  Placement Verification: Chest x-ray;End tidal CO2   Secured at (cm) 24   Measured from Teeth   Secured Location Center   Secured by Commercial tube irizarry   Site Condition Dry   Cuff Pressure (color) Green   Respiratory Charges    $ Intubation/Intubation Assist Yes     RT Ventilator Management Note  Duncan Pearce 59 y.o. male MRN: [id number]  Unit/Bed#: ICU 11  Encounter: [id number]      Daily Screen         11/16/2024  0556             Patient safety screen outcome:: Failed    Not Ready for Weaning due to:: Underline problem not resolved              Physical Exam:   Assessment Type: Assess only  General Appearance: Sedated  Respiratory Pattern: Assisted  Chest Assessment: Chest expansion symmetrical  Bilateral Breath Sounds: Clear  Cough: None  Suction: ET Tube, Oral  O2 Device: Vent      Resp Comments: Pt was successfully intubated with a 7.5 ET tube 24@ teeth. Pt was placed on full vent support with proper settings.

## 2024-11-16 NOTE — ASSESSMENT & PLAN NOTE
Will administer thiamine, folic acid, multivitamin intravenously daily.  Monitor closely for signs of withdrawal  CIWA protocol when appropriate

## 2024-11-16 NOTE — RESPIRATORY THERAPY NOTE
RT Ventilator Management Note  Duncan Pearce 59 y.o. male MRN: [id number]  Unit/Bed#: ICU 11 Encounter: [id number]      Daily Screen         11/16/2024  0556 11/16/2024  0814          Patient safety screen outcome:: Failed Failed      Not Ready for Weaning due to:: Underline problem not resolved Underline problem not resolved                Physical Exam:   Assessment Type: Assess only  General Appearance: Sedated  Respiratory Pattern: Assisted  Chest Assessment: Chest expansion symmetrical  Bilateral Breath Sounds: Diminished  Cough: None  Suction: ET Tube  O2 Device: vent  Subjective Data: sedated      Resp Comments: pt remains intubated and on full mechanical support  no changes at this time  skin integrity remains intact       11/16/24 1021   Respiratory Assessment   Assessment Type Assess only   General Appearance Sedated   Respiratory Pattern Assisted   Chest Assessment Chest expansion symmetrical   Bilateral Breath Sounds Diminished   Suction ET Tube   O2 Device vent   Subjective Data sedated   Vent Information   Vent ID [id number]   Vent type Bella C3   Bella Vent Mode APVcmv   $ Pulse Oximetry Spot Check Charge Completed   SpO2 90 %   APVcmv Settings   Resp Rate (BPM) 24 BPM   VT (mL) 450 mL   Insp Time (sec) 1 sec   FIO2 (%) 40 %   PEEP (cmH2O) 6 cmH2O   I:E Ratio 1:1.5   Insp Resistance 12   Flow Trigger (LPM) 2 LPM   Humidification Heater   Heater Temp 98.6 °F (37 °C)   APVcmv Actuals   Resp Rate (BPM) 24 BPM   VT (mL) 451 mL   MV (Obs) 10.7   Peak Pressure (cmH2O) 23 cmH2O   I:E Ratio (Obs) 1:1.5   Static Compliance (mL/cmH20) 35.1 mL/cmH2O   Heater Temperature (Obs) 98.6 °F (37 °C)   APVcmv ALARMS   High Peak Pressure (cmH2O) 45 cmH2O   Low Peak Pressure (cmH2O) 5 cm H2O   High Resp Rate (BPM) 40 BPM   High MV (L/min) 15 L/min   Low MV (L/min) 2 L/min   High VT (mL) 700 mL   Low VT (mL) 200 mL   Apnea Time (s) 20 S   ETT  Hi-Lo;Cuffed 8 mm   Placement Date/Time: 11/16/24 (c) 0537   Type:  Hi-Lo;Cuffed  Tube Size: 8 mm  Location: Oral  Insertion attempts: 2  Placement Verification: Chest x-ray;End tidal CO2   Secured at (cm) 24   Measured from Teeth   Secured Location Center   Secured by Commercial tube irizarry   Site Condition Dry   Cuff Pressure (color) Green   HI-LO Suction  Continuous low suction   HI-LO Secretions Blood tinged;Moderate   HI-LO Intervention Patent

## 2024-11-16 NOTE — ASSESSMENT & PLAN NOTE
Patient with long history of alcoholic cirrhosis, portal hypertension, and multiple varices.  Resultant thrombocytopenia, transaminitis, and hypoglycemia.  Gastroenterology consulted

## 2024-11-16 NOTE — PROGRESS NOTES
Progress Note - Critical Care/ICU   Name: Duncan Pearce 59 y.o. male I MRN: [id number]  Unit/Bed#: ICU 11 I Date of Admission: 11/16/2024   Date of Service: 11/16/2024 I Hospital Day: 0       Interval Events:  Following initial improvement in hemodynamics and mental status following code crimson, patient had worsening hypotension and became encephalopathic again necessitating intubation for hypoxia and for airway protection. A second code crimson was called and patient received an additional 2 units PRBC, 1 FFP, and 1 cryoprecipitate. Patient had escalating norepinephrine requirements, so vasopressin was also added. Repeat hemoglobin 6.8. 2 more units PRBC given and 2 units of platelets were transfused following receiving them from [facility]. GI updated on above and performed an emergent EGD at the bedside. Multiple large esophageal varices were identified with one actively spurting blood. The patient received 6 bands with hemostasis achieved. Repeat hemoglobin 10.3 and platelets 78. Vasopressor requirements have stabilized with patient continuing to require both norepinephrine as well as vasopressin infusions. Lactate trending down with most recent level of 16.8. Anticipate slow clearance of lactate level in the setting of acute liver dysfunction.    Pertinent New Data:   Visit Vitals  /59   Pulse 102   Temp 97.9 °F (36.6 °C)   Resp (!) 24   Ht 6' (1.829 m)   Wt 79.2 kg (174 lb 9.7 oz)   SpO2 96%   BMI 23.68 kg/m²   Smoking Status Every Day   BSA 2.01 m²     Arterial Line  Kelly /51  Arterial Line BP  Min: 66/39  Max: 178/87   MAP 68 mmHg  Arterial Line MAP (mmHg)  Min: 50 mmHg  Max: 123 mmHg   Physical Exam  Vitals reviewed.   Skin:     General: Skin is warm and dry.   HENT:      Head: Normocephalic and atraumatic.      Nose:      Comments: Bloody drainage from mouth and nose during EGD  Neck:      Vascular: Central line present.   Cardiovascular:      Rate and Rhythm: Regular rhythm.  Tachycardia present.      Pulses: Decreased pulses.      Heart sounds: Normal heart sounds.   Musculoskeletal:      Right lower leg: No edema.      Left lower leg: No edema.   Abdominal: General: Abdomen is protuberant. There is distension.     Palpations: Abdomen is soft.      Tenderness: There is no guarding.   Constitutional:       Appearance: He is ill-appearing.      Interventions: He is sedated, chemically paralyzed, intubated and restrained.   Pulmonary:      Effort: He is intubated.      Breath sounds: Decreased breath sounds present.   Neurological:      Comments: Exam limited by intubation/sedation/recent NMB        I have personally reviewed pertinent lab results., CBC:   Lab Results   Component Value Date    WBC 21.11 (H) 11/16/2024    HGB 9.9 (L) 11/16/2024    HCT 31.7 (L) 11/16/2024    MCV 90 11/16/2024    PLT 92 (L) 11/16/2024    RBC 3.54 (L) 11/16/2024    MCH 28.0 11/16/2024    MCHC 31.2 (L) 11/16/2024    RDW 16.4 (H) 11/16/2024    MPV 11.9 11/16/2024   , CMP:   Lab Results   Component Value Date    SODIUM 141 11/16/2024    K 4.7 11/16/2024    CL 99 11/16/2024    CO2 25 11/16/2024    BUN 29 (H) 11/16/2024    CREATININE 1.90 (H) 11/16/2024    CALCIUM 8.5 11/16/2024    AST 6,213 (H) 11/16/2024    ALT 1,027 (H) 11/16/2024    ALKPHOS 240 (H) 11/16/2024    EGFR 37 11/16/2024   , ABG:   Lab Results   Component Value Date    PHART 7.271 (L) 11/16/2024    MCB4ELX 35.1 (L) 11/16/2024    PO2ART 94.8 11/16/2024    YXW2LUT 15.8 (L) 11/16/2024    BEART -10.2 11/16/2024    SOURCE Line, Arterial 11/16/2024   , PT/INR:   Lab Results   Component Value Date    INR 1.94 (H) 11/16/2024   Lactic acid, troponin  chest xrayResults Review Statement: I personally reviewed the following image studies/reports in PACS and discussed pertinent findings with Radiology: chest xray. My interpretation of the radiology images/reports is: ETT in proper location.    Assessment and Plan  Acute Variceal bleed  EGD as above, 6 bands placed  on esophageal varices with hemostasis achieved  Will need repeat EGD on Monday  GI consulted, appreciate input  Continue PPI IV Q12h and octreotide infusion  Avoid orogastric tube placement  ABLA/ thrombocytopenia  Received a total of 8 PRBC, 2 FFP, 1 PCC, 2 platelets, 1 cryo  Repeat Hemoglobin 10.3  Monitor H&H Q6h  Hemorrhagic shock  See above for blood products administered  Continue vasopressors titrated to maintain MAP >65  Check SVO2 to rule out cardiogenic component  Transaminitis  Trend LFTs, currently trending up  Monitor abdominal exam  Check RUQ US with doppler  Coagulopathy  Repeat INR at noon    Billing Level:  Critical Care Time Statement: Upon my evaluation, this patient had a high probability of imminent or life-threatening deterioration due to ABLA, Acute variceal bleed,hemorrhagic shock, transaminitis, HAGMA, GLORY, thrombocytopenia, coagulopathy which required my direct attention, intervention, and personal management.  I spent a total of 60 minutes directly providing critical care services, including interpretation of complex medical databases, evaluating for the presence of life-threatening injuries or illnesses, management of organ system failure(s) , complex medical decision making (to support/prevent further life-threatening deterioration)., interpretation of hemodynamic data, titration of vasoactive medications, and ventilator management. This time is exclusive of procedures, teaching, family meetings, and any prior time recorded by providers other than myself.      IRAJ Ching

## 2024-11-16 NOTE — ASSESSMENT & PLAN NOTE
On ER presentation, hemoglobin 4.7 with severe hypotension requiring code Crimson  Continuing to transfuse for hypotension in anticipation of EGD this morning by gastroenterology  No overt losses noted as of yet

## 2024-11-16 NOTE — CONSULTS
Consultation -  [facility]  Duncan Pearce 59 y.o. male MRN: [id number]  Unit/Bed#: ICU 11 Encounter: [id number]      Inpatient consult to gastroenterology  Consult performed by: Indio Manrique III, MD  Consult ordered by: Chuyita Galvan MD          Reason for Consult / Principal Problem: GI blood loss    HPI: Duncan Pearce is a 59 y.o. year old male who presents with known decompensated alcohol cirrhosis complicated by ascites who was brought in by EMS after being found on the floor minimally responsive after a fall with confusion on the phone.  He was hypoglycemic and hypotensive in the emergency room with a hemoglobin of 4.7.  The patient has reported black stool over the last week but has had no melena since admission.  He has had 2 COVID and creatinine since and has had transfusion of cryoprecipitate, FFP, PRBCs in the emergency room and is still hypotensive with a hemoglobin of 6.8.  The patient has multiple metabolic abnormalities including a lactic acid level of 23 and a LFT profile consistent with ischemic hepatitis.    REVIEW OF SYSTEMS:       Historical Information   Past Medical History:   Diagnosis Date    ETOH abuse     Liver cirrhosis (HCC)     SBP (spontaneous bacterial peritonitis) (HCC) 2019     Past Surgical History:   Procedure Laterality Date    COLONOSCOPY N/A 7/3/2017    Procedure: COLONOSCOPY;  Surgeon: Humberto Butler MD;  Location: [facility];  Service: Gastroenterology    ESOPHAGOGASTRODUODENOSCOPY N/A 6/25/2017    Procedure: ESOPHAGOGASTRODUODENOSCOPY (EGD);  Surgeon: Indio Manrique III, MD;  Location: [facility];  Service: Gastroenterology    IR PARACENTESIS  12/31/2018     Social History   Social History     Substance and Sexual Activity   Alcohol Use Yes    Comment: vodka -approx one bottle every nite.      Social History     Substance and Sexual Activity   Drug Use No     Social History     Tobacco Use   Smoking Status Every Day    Current  packs/day: 1.00    Average packs/day: 1 pack/day for 25.0 years (25.0 ttl pk-yrs)    Types: Cigarettes   Smokeless Tobacco Never     History reviewed. No pertinent family history.    Meds/Allergies     No medications prior to admission.    Current Facility-Administered Medications:     albumin human (FLEXBUMIN) 5 % injection **ADS Override Pull**,     albumin human (FLEXBUMIN) 5 % injection 25 g, Once    ceftriaxone (ROCEPHIN) 1 g/50 mL in dextrose IVPB, Q12H    chlorhexidine (PERIDEX) 0.12 % oral rinse 15 mL, Q12H STACI    EPINEPHrine PF (ADRENALIN) 1 mg/mL injection **ADS Override Pull**,     folic acid 1 mg, thiamine (VITAMIN B1) 100 mg in sodium chloride 0.9 % 100 mL IV piggyback, Daily    metoclopramide (REGLAN) injection 10 mg, Once    metroNIDAZOLE (FLAGYL) IVPB (premix) 500 mg 100 mL, Q8H    midazolam (VERSED) injection 2 mg, Q1H PRN    NOREPINEPHRINE 4 MG  ML NSS (CMPD ORDER) infusion, Titrated    octreotide (SandoSTATIN) 500 mcg in sodium chloride 0.9 % 250 mL infusion, Continuous, Last Rate: 50 mcg/hr (11/16/24 0353)    pantoprazole (PROTONIX) injection 40 mg, Q12H STACI    phenylephrine (BAYRON-SYNEPHRINE) 100 mg (DOUBLE CONCENTRATION) IV in sodium chloride 0.9% 250 mL, Titrated, Last Rate: 50 mcg/min (11/16/24 0626)    sodium bicarbonate 150 mEq in dextrose 5 % 1,000 mL infusion, Continuous, Last Rate: 125 mL/hr (11/16/24 0257)    vasopressin (PITRESSIN) 20 Units in sodium chloride 0.9 % 100 mL infusion, Continuous, Last Rate: 0.04 Units/min (11/16/24 0626)    vecuronium (NORCURON) injection 10 mg, Once    No Known Allergies    Objective   Blood pressure 147/68, pulse 101, temperature (!) 94.1 °F (34.5 °C), resp. rate (!) 30, height 6' (1.829 m), weight 83.4 kg (183 lb 13.8 oz), SpO2 97%.    Intake/Output Summary (Last 24 hours) at 11/16/2024 0644  Last data filed at 11/16/2024 0610  Gross per 24 hour   Intake 1050 ml   Output 200 ml   Net 850 ml         PHYSICAL EXAM:      General Appearance:    Intubated sedated jaundice   HEENT:   Normocephalic, atraumatic, anicteric.     Neck:  Supple, symmetrical, trachea midline, no adenopathy;    thyroid: no enlargement/tenderness/nodules; no carotid  bruit or JVD    Lungs:   Clear to auscultation bilaterally; no rales, rhonchi or wheezing; respirations unlabored    Heart::   S1 and S2 normal; regular rate and rhythm; no murmur, rub, or gallop.   Abdomen:   Soft, non-tender, distended; normal bowel sounds; no masses, no organomegaly    Genitalia:   Deferred    Rectal:   Deferred    Extremities:  No cyanosis, clubbing; there is edema edema    Pulses:  1+ and symmetric all extremities    Skin:  Skin color, texture, turgor normal, no rashes or lesions    Lymph nodes:  No palpable cervical, axillary or inguinal lymphadenopathy        Lab Results:   No results displayed because visit has over 200 results.          Imaging Studies: I have personally reviewed pertinent imaging studies.    He had a CAT scan of the abdomen pelvis in the emergency room that showed infrarenal abdominal aortic aneurysm diffuse thickening of the bowel esophagus is distended stomach is distended with fluid.  I have personally reviewed these images.  There is ascites.    Bedside paracentesis was performed and was yellow and not bloody    ASSESSMENT & PLAN:  Principal Problem:    Hemorrhagic shock (HCC)  Active Problems:    ETOH abuse    ABLA (acute blood loss anemia)    Acute respiratory failure (HCC)    Alcoholic cirrhosis (HCC)    High anion gap metabolic acidosis    GLORY (acute kidney injury) (HCC)    Transaminitis    Thrombocytopenia (HCC)    Hyperammonemia (HCC)    Encephalopathy    GI bleed    Hypoglycemia    Coagulopathy (HCC)    He is a 59-year-old male with alcohol cirrhosis decompensated by hepatic encephalopathy GI blood loss and ascites admitted with hypotension profound metabolic abnormalities indicating ischemic hepatitis with lactic acid level of 23 possibly indicating ischemic bowel  status post to code Crimson's with hemoglobin of 6.8    We will proceed to EGD emergently; informed consent has been given to his significant other but she is intoxicated at present  He is status post paracentesis at the bedside just now with yellow fluid  Protonix drip  Octreotide drip  Transfusion of PRBCs FFP per ICU      MELD 3.0: 29 at 11/16/2024  5:38 AM  MELD-Na: 29 at 11/16/2024  5:38 AM  Calculated from:  Serum Creatinine: 1.79 mg/dL at 11/16/2024  5:38 AM  Serum Sodium: 136 mmol/L at 11/16/2024  5:38 AM  Total Bilirubin: 4.13 mg/dL at 11/16/2024  5:38 AM  Serum Albumin: 2.3 g/dL at 11/16/2024  5:38 AM  INR(ratio): 2.65 at 11/16/2024  5:38 AM  Age at listing (hypothetical): 59 years  Sex: Male at 11/16/2024  5:38 AM        Indio Manrique III, MD  11/16/2024,6:44 AM

## 2024-11-16 NOTE — ASSESSMENT & PLAN NOTE
Related to his alcoholic cirrhosis  Continue to attempt to correct coagulopathy while actively bleeding

## 2024-11-16 NOTE — ED PROVIDER NOTES
Time reflects when diagnosis was documented in both MDM as applicable and the Disposition within this note       Time User Action Codes Description Comment    11/16/2024  3:13 AM Payton Roberson Add [R57.8] Hemorrhagic shock (HCC)     11/16/2024  3:26 AM Medrano-Tesoriero, Chuyita Add [K70.31] Alcoholic cirrhosis of liver with ascites (HCC)     11/16/2024  3:27 AM Medrano-TesorieroGenesisChuyita Modify [R57.8] Hemorrhagic shock (HCC)     11/16/2024  3:33 AM Medrano-Tesoriero, Chuyita Add [E16.2] Hypoglycemia     11/16/2024  3:33 AM Medrano-Tesoriero, Chuyita Add [K76.82] Hepatic encephalopathy (HCC)     11/16/2024  3:33 AM Medrano-Tesoriero, Chuyita Add [D72.825] Bandemia     11/16/2024  3:33 AM Medrano-Tesoriero, Chuyita Add [E87.20] Lactic acidosis     11/16/2024  3:33 AM Medrano-Tesoriero, Chuyita Add [D72.829] Leukocytosis     11/16/2024  3:34 AM Medrano-Tesoriero, Chuyita Add [D69.6] Thrombocytopenia (HCC)     11/16/2024  3:34 AM Medrano-Tesoriero, Chuyita Add [D68.9] Coagulopathy (HCC)     11/16/2024  3:34 AM Medrano-Tesoriero, Chuyita Add [E87.20] Metabolic acidosis     11/16/2024  3:35 AM Medrano-Tesoriero, Chuyita Add [N17.9] GLORY (acute kidney injury) (HCC)     11/16/2024  3:35 AM Medrano-Tesoriero, Chuyita Add [E80.6] Hyperbilirubinemia     11/16/2024  3:36 AM Medrano-Tesoriero, Chuyita Add [R74.01] Transaminitis     11/16/2024  3:37 AM Medrano-Tesoriero, Chuyita Add [G93.40] Acute encephalopathy     11/16/2024  3:37 AM Chuyita Galvan [K76.6] Portal hypertension (HCC)     11/16/2024  4:47 AM Chuyita Galvan [T68.XXXA] Hypothermia, initial encounter     11/16/2024  4:47 AM Chuyita Galvan [K92.2] Upper GI bleed           ED Disposition       ED Disposition   Admit    Condition   Critical    Date/Time   Sat Nov 16, 2024  3:26 AM    Comment   Case was discussed with Dr Emmanuel and the patient's admission status was agreed  "to be Admission Status: inpatient status to the service of Dr. Emmanuel.               Assessment & Plan       Medical Decision Making  This is a 59-year-old male who presents here today with EMS for altered mental status.  They had a call from a \"friend\" who does not live with the patient that they were short of breath and possibly detoxing from alcohol.  According to EMS, there was nobody in the home, they were partially completed bottles of amoxicillin and Percocet from October, but no other obvious medications.  He was sitting on the floor, moaning but not responding, appeared to jaundice, was hypotensive to the 80s, and hypoglycemic to the 50s.  They were unable to obtain IV access so gave IM glucagon initially with improvement to the 140s.  They did eventually obtain IV access and gave him fluids with initial response of blood pressure to the low 100s.  The patient is unable to provide any history.  He has occasional mild moaning.  Last visit to the hospital was 12/30/2018 for SBP.  He has had several visits to urgent care since then for dental abscess, with most recent visit listed in March for a laceration to his left pinky.  There is no other care in the computer since then.    Review of systems: Unable to obtain due to acuity of patient condition.    He is ill-appearing, in significant distress.  He does not have overt jaundice but does have scleral icterus.  He has distended, firm abdomen without fluid wave.  He occasionally moves his arms but not purposefully, or in response to commands.  He does not respond to painful stimuli.  He does have ecchymoses to his left arm.  He is tachypneic, but not having Kusmal respirations.  He has no accessory muscle use.  He is hypotensive back to the 80s.  Exam is otherwise unremarkable.  Concern is for intracranial hemorrhage or intra-abdominal hemorrhage secondary to fall and trauma, hepatic encephalopathy, acute liver failure with decompensated cirrhosis, SBP or other " infection, heart failure, dysrhythmia, related to alcohol or other drug use.  As he is presumed to be coagulopathic due to known history of cirrhosis with altered mental status and ecchymoses to his arm and inability to obtain a history from the patient, trauma alert was called to facilitate rapid imaging for signs of trauma.  Though GCS is currently low with high suspicion for hepatic encephalopathy, if this is able to be corrected without intubation may help from difficulties with sedation.  We will check lab work to evaluate.  Given concerns for third spacing secondary to known cirrhosis we will give cautious fluid bolus to prevent further third spacing, with further boluses based on clinical response.  Repeat blood sugar here was down to 70, so we will bolus D10, which just been hung by EMS prior to arrival at the emergency department.  Given concern for possible recurrent SBP, we will treat him with Rocephin.    Repeat blood sugar improved to 144, and the patient randomly started reaching up into the air and moaning when his name was called, trying to look at people, but not otherwise responding or following commands.    CT scan of the head was reviewed myself and does not show any obvious intracranial hemorrhage.  He has no obvious pneumothorax, however there is a large hyperdense area in the liver concerning for possible hemorrhage, either due to varices, portal hypertension, or potentially underlying hepatocellular carcinoma given irregular appearance.  He does have ascites, though it is uncertain of whether this is from cirrhosis or due to hemorrhage.  On return from CT scan blood pressure, which had initially improved to the 90s, was back down to 74, at which point in time miguel Keyrosa maria was called given concerns for bleeding.  Given overall diffuse coagulopathy and concerns for active bleeding will require balance resuscitation.    IV access was lost, so Cordis was placed to facilitate large-volume fluid  administration as above.  ICU team had arrived at bedside after code Crimson and helped place arterial line.  Initial attempt was made in the right groin given easily palpable femoral pulse.  However, shortly after this was accessed, there was loss of flow from the catheter and no further palpable pulse to the area.  He did have palpable carotid pulse at the time, however given concern for impending cardiac arrest was given 1 mg of epinephrine with rapid improvement of blood pressure up to the 180s and improvement of mental status.  He is given 2 units of packed red blood cells, 1 of FFP, and given oozing from prior puncture sites and uncertain time to get platelets, will give Kcentra and vitamin K to help with coagulopathy.  Though mental status was still tenuous and GCS was low, with degree of hypotension was deemed to be too unstable for intubation at this time with normal sats prior to further resuscitation.  He did bite down on the suction catheter when it was inserted into his mouth.    The patient did have some improvement of mental status, though had very dysarthric speech and was difficult to understand.  He was responding appropriately to staff and following simple commands.  He is anemic down to 4.7 with leukocytosis, possibly stress response versus infection, with bandemia of 20.  Platelets are 102, however are likely dysfunctional given cirrhosis.  Creatinine is 1.77, increased from last check almost 6 years ago, with anion gap of 32 and CO2 of 11.  Sodium is normal, however lactic acid is elevated at 25.2.  Bilirubin is only 4.53, with direct of 2.78.  He has a transaminitis with AST/ALT of 433/933.  He is hypoalbuminemic to 2.7.  pH is 6.939 with pCO2 of 16.9.  Ammonia is elevated to 449.  Given coagulopathy, there is significant risk of bleeding for placement of rectal tube for enema, with varices seen on CT scan there is high risk of NG tube placement to facilitate lactulose administration, and the  mental status is gradually improving he is still too altered to safely allow for oral administration as there is a high likelihood of resulting aspiration.  CT scan read with no acute traumatic injuries.  Area of concern for bleeding was a recannulated umbilical vein.  He has extensive varices.  I did discuss the patient with Dr. Manrique from GI, who does recommend octreotide given concern for variceal bleeding.  He will see the patient for [place] in the morning for evaluation for EGD.  He does also recommend Reglan as it may help with gastric motility and stomach emptying of fluid.    He did have improvement of his blood pressure after the first 2 units of packed red cells, stable to the low 100s, with gradually improving mental status.  He says he knows he is ill.  He said he had stopped drinking for a while but started up again when he retired.  He says his last drink was several days ago.  He is aware he is in the hospital but is currently unsure of the month or year.  He does endorse dark stools for about a week.  He denies any vomiting or hematemesis.    The patient did have transient decline of blood pressures back down to the 70s, however was still awake, talking, maintaining mental status.  Crossmatched blood was available, so 2 units were ordered.  First unit was started prior to taking him up to the ICU, with second unit going up with him.  There is no update on when platelets become available.    Shortly after arriving to the ICU, miguel Keyrosa maria was called to the patient's room.  I did go up to see if help was needed.  GI was at bedside and was requesting paracentesis to evaluate for hemorrhagic ascites given minimal improvement of hemoglobin with resuscitation and no melena or hematemesis thus far despite reports at home.  The patient is still coagulopathic, though INR is improving and he is still having some oozing from puncture sites.  Bedside ultrasound does show significant bowel on the left with no  good fluid pocket, and multiple loops of bowel in the right abdomen.  There was a small pocket without bowel on the right side, and direct visualization using a linear probe was performed to obtain 5 cc of fluid as above which was mildly cloudy, yellow in color without any blood.    Problems Addressed:  Alcoholic cirrhosis of liver with ascites (HCC): chronic illness or injury with severe exacerbation, progression, or side effects of treatment that poses a threat to life or bodily functions  Bandemia: acute illness or injury  Coagulopathy (HCC): acute illness or injury  Hemorrhagic shock (HCC): acute illness or injury  Hepatic encephalopathy (HCC): acute illness or injury  Hypoglycemia: acute illness or injury  Lactic acidosis: acute illness or injury  Leukocytosis: acute illness or injury  Metabolic acidosis: acute illness or injury  Thrombocytopenia (HCC): acute illness or injury    Amount and/or Complexity of Data Reviewed  Labs: ordered.  Radiology: ordered.    Risk  Prescription drug management.  Decision regarding hospitalization.             Medications   sodium bicarbonate 150 mEq in dextrose 5 % 1,000 mL infusion (125 mL/hr Intravenous New Bag 11/16/24 0257)   octreotide (SandoSTATIN) 500 mcg in sodium chloride 0.9 % 250 mL infusion (50 mcg/hr Intravenous New Bag 11/16/24 0353)   metoclopramide (REGLAN) injection 10 mg (has no administration in time range)   lactated ringers bolus 500 mL (0 mL Intravenous Stopped 11/16/24 0245)   glucagon (FOR EMS ONLY) (GLUCAGEN) injection 1 mg (0 mg Does not apply Given to EMS 11/16/24 0129)   ondansetron (FOR EMS ONLY) (ZOFRAN) 4 mg/2 mL injection 4 mg (0 mg Does not apply Given to EMS 11/16/24 0129)   iohexol (OMNIPAQUE) 350 MG/ML injection (MULTI-DOSE) 100 mL (100 mL Intravenous Given 11/16/24 0144)   ceftriaxone (ROCEPHIN) 2 g/50 mL in dextrose IVPB (0 mg Intravenous Stopped 11/16/24 0215)   phytonadione (AQUA-MEPHYTON) 10 mg/mL SC/IM injection 10 mg (10 mg  Subcutaneous Given 11/16/24 0254)   prothrombin complex concentrate (human) (Kcentra) 2,000 Units (2,000 Units Intravenous Given 11/16/24 0258)   pantoprazole (PROTONIX) 80 mg in sodium chloride 0.9 % 100 mL IVPB (0 mg Intravenous Stopped 11/16/24 0406)       ED Risk Strat Scores                                               History of Present Illness       Chief Complaint   Patient presents with    Altered Mental Status     Arrived from home via EMS. Per EMS, unknown person within the home activated 911. EMS observed Pt sitting on floor, semi-responsive, SOB, and jaundice. EMS administered Zofran 4 mg and 1g glucagon.       Past Medical History:   Diagnosis Date    ETOH abuse     Liver cirrhosis (HCC)     SBP (spontaneous bacterial peritonitis) (HCC) 2019      Past Surgical History:   Procedure Laterality Date    COLONOSCOPY N/A 7/3/2017    Procedure: COLONOSCOPY;  Surgeon: Humberto Butler MD;  Location: [facility];  Service: Gastroenterology    ESOPHAGOGASTRODUODENOSCOPY N/A 6/25/2017    Procedure: ESOPHAGOGASTRODUODENOSCOPY (EGD);  Surgeon: Indio Manrique III, MD;  Location: [facility];  Service: Gastroenterology    IR PARACENTESIS  12/31/2018      History reviewed. No pertinent family history.   Social History     Tobacco Use    Smoking status: Every Day     Current packs/day: 1.00     Average packs/day: 1 pack/day for 25.0 years (25.0 ttl pk-yrs)     Types: Cigarettes    Smokeless tobacco: Never   Substance Use Topics    Alcohol use: Yes     Comment: vodka -approx one bottle every nite.     Drug use: No      E-Cigarette/Vaping      E-Cigarette/Vaping Substances      I have reviewed and agree with the history as documented.       Altered Mental Status      Review of Systems        Objective       ED Triage Vitals   Temperature Pulse Blood Pressure Respirations SpO2 Patient Position - Orthostatic VS   11/16/24 0220 11/16/24 0122 11/16/24 0122 11/16/24 0122 11/16/24 0122 11/16/24 0122   (!) 92 °F (33.3 °C) 93 (!)  80/51 (!) 23 99 % Lying      Temp Source Heart Rate Source BP Location FiO2 (%) Pain Score    11/16/24 0220 11/16/24 0122 11/16/24 0122 -- 11/16/24 0520    Probe Monitor Right arm  No Pain      Vitals      Date and Time Temp Pulse SpO2 Resp BP Pain Score FACES Pain Rating User   11/16/24 0515 94.5 °F (34.7 °C) 92 -- 23 71/40 -- --    11/16/24 0445 -- 88 98 % 24 97/54 -- --    11/16/24 0433 93.7 °F (34.3 °C) 96 -- 24 97/54 -- --    11/16/24 0415 93.2 °F (34 °C) 93 100 % 24 108/52 -- --    11/16/24 0400 93 °F (33.9 °C) 91 100 % 24 112/56 -- --    11/16/24 0358 93 °F (33.9 °C) 93 100 % 24 105/50 -- --    11/16/24 0340 92.7 °F (33.7 °C) 93 100 % 24 110/55 -- --    11/16/24 0330 92.5 °F (33.6 °C) 93 100 % 24 112/55 -- --    11/16/24 0320 92.5 °F (33.6 °C) 93 100 % 26 118/56 -- --    11/16/24 0315 92.5 °F (33.6 °C) 96 100 % 24 124/64 -- --    11/16/24 0310 92.5 °F (33.6 °C) 94 100 % 26 125/66 -- --    11/16/24 0300 92.5 °F (33.6 °C) 93 100 % 24 125/65 -- --    11/16/24 0255 92.5 °F (33.6 °C) 96 100 % 24 144/68 -- --    11/16/24 0240 -- 97 100 % 19 -- -- --    11/16/24 0235 -- 96 100 % 19 166/81 -- --    11/16/24 0230 -- 93 99 % 21 182/85 -- --    11/16/24 0225 -- 89 99 % 22 186/90 -- --    11/16/24 0220 92 °F (33.3 °C) 94 97 % 24 178/106 -- --    11/16/24 0215 -- 104 97 % 24 181/101 -- --    11/16/24 0209 -- 89 95 % 22 87/50 -- --    11/16/24 0200 -- 88 92 % 22 74/38 -- --    11/16/24 0122 -- 93 99 % 23 80/51 -- --             Physical Exam  Vitals and nursing note reviewed.   Constitutional:       General: He is in acute distress.      Appearance: He is well-developed. He is toxic-appearing.   HENT:      Head: Normocephalic and atraumatic.      Mouth/Throat:      Mouth: Mucous membranes are moist.   Eyes:      General: Scleral icterus present.      Conjunctiva/sclera: Conjunctivae normal.      Pupils: Pupils are equal, round, and reactive to light.   Neck:      Trachea: No  tracheal deviation.   Cardiovascular:      Rate and Rhythm: Regular rhythm. Tachycardia present.      Pulses:           Carotid pulses are 2+ on the left side.     Heart sounds: Normal heart sounds.   Pulmonary:      Effort: Pulmonary effort is normal. Tachypnea present. No accessory muscle usage, respiratory distress or retractions.      Breath sounds: Normal breath sounds.   Abdominal:      General: Abdomen is protuberant. There is distension.      Palpations: There is no fluid wave.      Comments: Firm abdomen. Visible varices along abdominal wall   Musculoskeletal:      Cervical back: Normal range of motion.      Right lower leg: No edema.      Left lower leg: No edema.   Skin:     General: Skin is cool and dry.      Capillary Refill: Capillary refill takes 2 to 3 seconds.      Comments: No gross jaundice. Mottling of lower legs   Neurological:      Mental Status: He is lethargic.      GCS: GCS eye subscore is 2. GCS verbal subscore is 2. GCS motor subscore is 1.      Comments: Occasional spontaneous movement of arms, no purposeful movement, no response to commands. Occasional moaning   Psychiatric:         Mood and Affect: Mood and affect normal.         Behavior: Behavior normal.         Results Reviewed       Procedure Component Value Units Date/Time    CBC and Platelet [[id number]]     Lab Status: No result Specimen: Blood     CK [[id number]]  (Abnormal) Collected: 11/16/24 0538    Lab Status: Final result Specimen: Blood Updated: 11/16/24 0626     Total  U/L     Rapid drug screen, urine [[id number]]  (Normal) Collected: 11/16/24 0523    Lab Status: Final result Specimen: Urine, Catheter Updated: 11/16/24 0624     Amph/Meth UR Negative     Barbiturate Ur Negative     Benzodiazepine Urine Negative     Cocaine Urine Negative     Methadone Urine Negative     Opiate Urine Negative     PCP Ur Negative     THC Urine Negative     Oxycodone Urine Negative     Fentanyl Urine Negative     HYDROCODONE URINE Negative     Narrative:      FOR MEDICAL PURPOSES ONLY.   IF CONFIRMATION NEEDED PLEASE CONTACT THE LAB WITHIN 5 DAYS.    Drug Screen Cutoff Levels:  AMPHETAMINE/METHAMPHETAMINES  1000 ng/mL  BARBITURATES     200 ng/mL  BENZODIAZEPINES     200 ng/mL  COCAINE      300 ng/mL  METHADONE      300 ng/mL  OPIATES      300 ng/mL  PHENCYCLIDINE     25 ng/mL  THC       50 ng/mL  OXYCODONE      100 ng/mL  FENTANYL      5 ng/mL  HYDROCODONE     300 ng/mL    Protime-INR [[id number]]  (Abnormal) Collected: 11/16/24 0538    Lab Status: Final result Specimen: Blood Updated: 11/16/24 0622     Protime 28.9 seconds      INR 2.65    Narrative:      INR Therapeutic Range    Indication                                             INR Range      Atrial Fibrillation                                               2.0-3.0  Hypercoagulable State                                    2.0.2.3  Left Ventricular Asist Device                            2.0-3.0  Mechanical Heart Valve                                  -    Aortic(with afib, MI, embolism, HF, LA enlargement,    and/or coagulopathy)                                     2.0-3.0 (2.5-3.5)     Mitral                                                             2.5-3.5  Prosthetic/Bioprosthetic Heart Valve               2.0-3.0  Venous thromboembolism (VTE: VT, PE        2.0-3.0    APTT [[id number]]  (Abnormal) Collected: 11/16/24 0538    Lab Status: Final result Specimen: Blood Updated: 11/16/24 0622     PTT 42 seconds     Ammonia [[id number]]  (Abnormal) Collected: 11/16/24 0538    Lab Status: Final result Specimen: Blood Updated: 11/16/24 0622     Ammonia 487 umol/L     Lactic acid, plasma (w/reflex if result > 2.0) [[id number]]  (Abnormal) Collected: 11/16/24 0537    Lab Status: Final result Specimen: Blood Updated: 11/16/24 0621     LACTIC ACID 23.4 mmol/L     Narrative:      Result may be elevated if tourniquet was used during collection.    Comprehensive metabolic panel [[id number]]  (Abnormal)  Collected: 11/16/24 0538    Lab Status: Final result Specimen: Blood Updated: 11/16/24 0618     Sodium 136 mmol/L      Potassium 5.8 mmol/L      Chloride 96 mmol/L      CO2 10 mmol/L      ANION GAP 30 mmol/L      BUN 30 mg/dL      Creatinine 1.79 mg/dL      Glucose 124 mg/dL      Calcium 7.3 mg/dL      Corrected Calcium 8.7 mg/dL      AST 4,388 U/L       U/L      Alkaline Phosphatase 255 U/L      Total Protein 4.3 g/dL      Albumin 2.3 g/dL      Total Bilirubin 4.13 mg/dL      eGFR 40 ml/min/1.73sq m     Narrative:      [employer] guidelines for Chronic Kidney Disease (CKD):     Stage 1 with normal or high GFR (GFR > 90 mL/min/1.73 square meters)    Stage 2 Mild CKD (GFR = 60-89 mL/min/1.73 square meters)    Stage 3A Moderate CKD (GFR = 45-59 mL/min/1.73 square meters)    Stage 3B Moderate CKD (GFR = 30-44 mL/min/1.73 square meters)    Stage 4 Severe CKD (GFR = 15-29 mL/min/1.73 square meters)    Stage 5 End Stage CKD (GFR <15 mL/min/1.73 square meters)  Note: GFR calculation is accurate only with a steady state creatinine    Magnesium [[id number]]  (Abnormal) Collected: 11/16/24 0538    Lab Status: Final result Specimen: Blood Updated: 11/16/24 0618     Magnesium 1.6 mg/dL     Phosphorus [[id number]]  (Abnormal) Collected: 11/16/24 0538    Lab Status: Final result Specimen: Blood Updated: 11/16/24 0618     Phosphorus 8.7 mg/dL     UA w Reflex to Microscopic w Reflex to Culture [[id number]]  (Abnormal) Collected: 11/16/24 0527    Lab Status: Final result Specimen: Urine, Indwelling Bernal Catheter Updated: 11/16/24 0616     Color, UA Yellow     Clarity, UA Slightly Cloudy     Specific Gravity, UA 1.020     pH, UA 5.5     Leukocytes, UA Negative     Nitrite, UA Negative     Protein, UA 30 (1+) mg/dl      Glucose, UA Negative mg/dl      Ketones, UA Trace mg/dl      Urobilinogen, UA 1.0 E.U./dl      Bilirubin, UA Negative     Occult Blood, UA Large    CBC and Platelet [[id number]]  (Abnormal)  Collected: 11/16/24 0538    Lab Status: Final result Specimen: Blood Updated: 11/16/24 0610     WBC 24.31 Thousand/uL      RBC 2.51 Million/uL      Hemoglobin 6.8 g/dL      Hematocrit 24.0 %      MCV 96 fL      MCH 27.1 pg      MCHC 28.3 g/dL      RDW 18.1 %      Platelets 63 Thousands/uL      MPV 12.1 fL     Narrative:      No Clots    HS Troponin I 4hr [[id number]]  (Abnormal) Collected: 11/16/24 0538    Lab Status: Final result Specimen: Blood Updated: 11/16/24 0552     hs TnI 4hr 85 ng/L      Delta 4hr hsTnI 46 ng/L     Blood gas, arterial [[id number]]  (Abnormal) Collected: 11/16/24 0537    Lab Status: Final result Specimen: Blood, Arterial Updated: 11/16/24 0544     pH, Arterial 7.121     pCO2, Arterial 14.5 mm Hg      pO2, Arterial 120.6 mm Hg      HCO3, Arterial 4.6 mmol/L      Base Excess, Arterial -22.7 mmol/L      O2 Content, Arterial 10.2 mL/dL      O2 HGB,Arterial  95.4 %     Procalcitonin [[id number]]  (Abnormal) Collected: 11/16/24 0406    Lab Status: Final result Specimen: Blood from Arm, Right Updated: 11/16/24 0539     Procalcitonin 0.39 ng/ml     HS Troponin I 2hr [[id number]]  (Abnormal) Collected: 11/16/24 0406    Lab Status: Final result Specimen: Blood from Arm, Right Updated: 11/16/24 0445     hs TnI 2hr 60 ng/L      Delta 2hr hsTnI 21 ng/L     Ethanol [[id number]]  (Normal) Collected: 11/16/24 0406    Lab Status: Final result Specimen: Blood from Arm, Right Updated: 11/16/24 0438     Ethanol Lvl <10 mg/dL     B-Type Natriuretic Peptide(BNP) [[id number]]  (Abnormal) Collected: 11/16/24 0129    Lab Status: Final result Specimen: Blood from Arm, Left Updated: 11/16/24 0327      pg/mL     Manual Differential(PHLEBS Do Not Order) [[id number]]  (Abnormal) Collected: 11/16/24 0129    Lab Status: Final result Specimen: Blood from Arm, Left Updated: 11/16/24 0327     Segmented % 65 %      Bands % 20 %      Lymphocytes % 6 %      Monocytes % 7 %      Eosinophils % 0 %      Basophils % 0 %       Metamyelocytes % 2 %      Absolute Neutrophils 18.92 Thousand/uL      Absolute Lymphocytes 1.34 Thousand/uL      Absolute Monocytes 1.56 Thousand/uL      Absolute Eosinophils 0.00 Thousand/uL      Absolute Basophils 0.00 Thousand/uL      Absolute Metamyelocytes 0.45 Thousand/uL      Total Counted --     Platelet Estimate Borderline     Anisocytosis Present     Hypochromia Present     Poikilocytes Present    FLU/COVID Rapid Antigen (30 min. TAT) - Preferred screening test in ED [[id number]]  (Normal) Collected: 11/16/24 0158    Lab Status: Final result Specimen: Nares from Nose Updated: 11/16/24 0304     SARS COV Rapid Antigen Negative     Influenza A Rapid Antigen Negative     Influenza B Rapid Antigen Negative    Narrative:      This test has been performed using the [facility] Teodora 2 FLU+SARS Antigen test under the Emergency Use Authorization (EUA). This test has been validated by the  and verified by the performing laboratory. The Teodora uses lateral flow immunofluorescent sandwich assay to detect SARS-COV, Influenza A and Influenza B Antigen.     The [facility] Teodora 2 SARS Antigen test does not differentiate between SARS-CoV and SARS-CoV-2.     Negative results are presumptive and may be confirmed with a molecular assay, if necessary, for patient management. Negative results do not rule out SARS-CoV-2 or influenza infection and should not be used as the sole basis for treatment or patient management decisions. A negative test result may occur if the level of antigen in a sample is below the limit of detection of this test.     Positive results are indicative of the presence of viral antigens, but do not rule out bacterial infection or co-infection with other viruses.     All test results should be used as an adjunct to clinical observations and other information available to the provider.    FOR PEDIATRIC PATIENTS - copy/paste COVID Guidelines URL to browser:  [email or web address]    Ammonia [[id number]]  (Abnormal) Collected: 11/16/24 0129    Lab Status: Final result Specimen: Blood from Arm, Left Updated: 11/16/24 0252     Ammonia 449 umol/L     CBC and differential [[id number]]  (Abnormal) Collected: 11/16/24 0129    Lab Status: Final result Specimen: Blood from Arm, Left Updated: 11/16/24 0232     WBC 22.26 Thousand/uL      RBC 1.98 Million/uL      Hemoglobin 4.7 g/dL      Hematocrit 19.5 %      MCV 99 fL      MCH 23.7 pg      MCHC 24.1 g/dL      RDW 22.1 %      MPV 12.1 fL      Platelets 102 Thousands/uL     Narrative:      This is an appended report.  These results have been appended to a previously verified report.    Blood gas, venous [[id number]]  (Abnormal) Collected: 11/16/24 0129    Lab Status: Final result Specimen: Blood from Arm, Left Updated: 11/16/24 0228     pH, Kirill 6.939     pCO2, Kirill 16.9 mm Hg      pO2, Kirill 169.1 mm Hg      HCO3, Kirill 3.5 mmol/L      Base Excess, Kirill -26.2 mmol/L      O2 Content, Kirill 7.3 ml/dL      O2 HGB, VENOUS 89.8 %     Lactic acid, plasma (w/reflex if result > 2.0) [[id number]]  (Abnormal) Collected: 11/16/24 0129    Lab Status: Final result Specimen: Blood from Arm, Left Updated: 11/16/24 0225     LACTIC ACID 25.2 mmol/L     Narrative:      Result may be elevated if tourniquet was used during collection.    Protime-INR [[id number]]  (Abnormal) Collected: 11/16/24 0129    Lab Status: Final result Specimen: Blood from Arm, Left Updated: 11/16/24 0216     Protime 37.1 seconds      INR 3.69    Narrative:      INR Therapeutic Range    Indication                                             INR Range      Atrial Fibrillation                                               2.0-3.0  Hypercoagulable State                                    2.0.2.3  Left Ventricular Asist Device                            2.0-3.0  Mechanical Heart Valve                                  -    Aortic(with afib,  "MI, embolism, HF, LA enlargement,    and/or coagulopathy)                                     2.0-3.0 (2.5-3.5)     Mitral                                                             2.5-3.5  Prosthetic/Bioprosthetic Heart Valve               2.0-3.0  Venous thromboembolism (VTE: VT, PE        2.0-3.0    APTT [[id number]]  (Abnormal) Collected: 11/16/24 0129    Lab Status: Final result Specimen: Blood from Arm, Left Updated: 11/16/24 0216     PTT 40 seconds     Basic metabolic panel [[id number]]  (Abnormal) Collected: 11/16/24 0129    Lab Status: Final result Specimen: Blood from Arm, Left Updated: 11/16/24 0216     Sodium 139 mmol/L      Potassium 4.9 mmol/L      Chloride 96 mmol/L      CO2 11 mmol/L      ANION GAP 32 mmol/L      BUN 29 mg/dL      Creatinine 1.77 mg/dL      Glucose 73 mg/dL      Calcium 8.0 mg/dL      eGFR 40 ml/min/1.73sq m     Narrative:      Unable to calculate concentration. Result is lower than the dynamic range.  [employer] guidelines for Chronic Kidney Disease (CKD):     Stage 1 with normal or high GFR (GFR > 90 mL/min/1.73 square meters)    Stage 2 Mild CKD (GFR = 60-89 mL/min/1.73 square meters)    Stage 3A Moderate CKD (GFR = 45-59 mL/min/1.73 square meters)    Stage 3B Moderate CKD (GFR = 30-44 mL/min/1.73 square meters)    Stage 4 Severe CKD (GFR = 15-29 mL/min/1.73 square meters)    Stage 5 End Stage CKD (GFR <15 mL/min/1.73 square meters)  Note: GFR calculation is accurate only with a steady state creatinine    Hepatic function panel [[id number]]  (Abnormal) Collected: 11/16/24 0129    Lab Status: Final result Specimen: Blood from Arm, Left Updated: 11/16/24 0216     Total Bilirubin 4.53 mg/dL      Bilirubin, Direct 2.78 mg/dL      Alkaline Phosphatase 304 U/L      AST 4,335 U/L       U/L      Total Protein 4.8 g/dL      Albumin 2.7 g/dL     Narrative:      Unable to calculate concentration. Result is lower than the dynamic range.    Acetaminophen level-\"If " "concentration is detectable, please discuss with medical  on call.\" [[id number]]  (Abnormal) Collected: 11/16/24 0129    Lab Status: Final result Specimen: Blood from Arm, Left Updated: 11/16/24 0216     Acetaminophen Level <2 ug/mL     Narrative:      Unable to calculate concentration. Result is lower than the dynamic range.    Salicylate level [[id number]]  (Normal) Collected: 11/16/24 0129    Lab Status: Final result Specimen: Blood from Arm, Left Updated: 11/16/24 0216     Salicylate Lvl <5 mg/dL     Narrative:      Unable to calculate concentration. Result is lower than the dynamic range.    Magnesium [[id number]]  (Abnormal) Collected: 11/16/24 0129    Lab Status: Final result Specimen: Blood from Arm, Left Updated: 11/16/24 0216     Magnesium 1.7 mg/dL     Narrative:      Unable to calculate concentration. Result is lower than the dynamic range.    TSH, 3rd generation with Free T4 reflex [[id number]]  (Normal) Collected: 11/16/24 0129    Lab Status: Final result Specimen: Blood from Arm, Left Updated: 11/16/24 0215     TSH 3RD GENERATON 1.036 uIU/mL     HS Troponin 0hr (reflex protocol) [[id number]]  (Normal) Collected: 11/16/24 0129    Lab Status: Final result Specimen: Blood from Arm, Left Updated: 11/16/24 0207     hs TnI 0hr 39 ng/L     Blood culture #1 [[id number]] Collected: 11/16/24 0158    Lab Status: In process Specimen: Blood from Arm, Right Updated: 11/16/24 0204    Blood culture #2 [[id number]] Collected: 11/16/24 0129    Lab Status: In process Specimen: Blood from Arm, Left Updated: 11/16/24 0137    Fingerstick Glucose (POCT) [[id number]]  (Abnormal) Collected: 11/16/24 0134    Lab Status: Final result Specimen: Blood Updated: 11/16/24 0135     POC Glucose 144 mg/dl     Fingerstick Glucose (POCT) [[id number]]  (Normal) Collected: 11/16/24 0121    Lab Status: Final result Specimen: Blood Updated: 11/16/24 0123     POC Glucose 72 mg/dl             TRAUMA - CT head wo contrast   Final " Interpretation by Juan Ramon Mosqueda MD (11/16 0224)      No acute intracranial hemorrhage or depressed calvarial fracture.                  Workstation performed: [id number]         TRAUMA - CT spine cervical wo contrast   Final Interpretation by Juan Ramon Mosqueda MD (11/16 0229)      Multilevel degenerative changes without acute cervical spine fracture or traumatic malalignment.                  Workstation performed: [id number]         TRAUMA - CT chest abdomen pelvis w contrast   Final Interpretation by Juan Ramon Mosqueda MD (11/16 0252)      1.  Exam limited by motion artifact. Cannot exclude subtle nondisplaced fractures given motion. Otherwise no acute displaced fracture or subluxation seen.   2.  Nonspecific subpleural 4 mm nodule in the right lung apex. 1.5 cm nonspecific nodular density in the left lower lobe. Additional subcentimeter subpleural nodular densities in the left lower lobe. Extensive dependent atelectasis and/or early airspace    disease in the bilateral lung bases.   3.  Esophagus is distended with air and fluid. Paraesophageal varices along the mid to distal esophagus noted likely sequelae of portal hypertension.   4.  Sequelae of hepatic cirrhosis, steatosis, and portal hypertension.   5.  Markedly dilated recanalized umbilical vein extending along the ventral abdominal wall with multiple dilated superficial abdominal varices are seen, increased in size compared to the prior study. Findings likely related to sequelae of portal    hypertension.   6.  No evidence of bowel obstruction or free air. Moderate to large amount of abdominal/pelvic ascites.   7.  Infrarenal abdominal aortic aneurysm measuring 3.5 x 3.3 cm noted. No evidence for aortic dissection. Recommend follow-up imaging every 3 years per current guidelines.   8.  No evidence of intrathoracic or intra-abdominal/pelvic visceral injury or vascular injury.   9.  Mild diffuse wall thickening of multiple abdominal/pelvic small bowel loops and  diffuse wall thickening of the colon noted which could suggest a diffuse infectious/inflammatory enterocolitis versus hepatic enterocolopathy.      Workstation performed: [id number]         XR chest 1 view portable    (Results Pending)   XR chest portable ICU    (Results Pending)   US right upper quadrant with liver dopplers    (Results Pending)       Central Line    Date/Time: 11/16/2024 12:25 AM    Performed by: Chuyita Galvan MD  Authorized by: Chuyita Galvan MD    Patient location:  ED and bedside  Other Assisting Provider: No    Consent:     Consent obtained:  Emergent situation    Alternatives discussed:  No treatment  Universal protocol:     Patient identity confirmed:  Arm band  Pre-procedure details:     Hand hygiene: Hand hygiene performed prior to insertion      Skin preparation:  2% chlorhexidine    Skin preparation agent: Skin preparation agent completely dried prior to procedure    Indications:     Central line indications: no peripheral vascular access    Anesthesia (see MAR for exact dosages):     Anesthesia method:  None  Procedure details:     Location:  Right internal jugular    Vessel type: vein      Laterality:  Right    Approach: percutaneous technique used      Patient position:  Trendelenburg    Catheter type:  Cordis    Catheter size:  6 Fr    Landmarks identified: yes      Ultrasound guidance: yes      Ultrasound image availability:  Not saved    Sterile ultrasound techniques: Sterile gel and sterile probe covers were used      Number of attempts:  1    Successful placement: yes    Post-procedure details:     Post-procedure:  Dressing applied and line sutured    Assessment:  No pneumothorax on x-ray, free fluid flow and blood return through all ports    Post-procedure complications: none      Patient tolerance of procedure:  Tolerated well, no immediate complications    Observer: Yes      Observer name:  Doc Mortensen RN  Paracentesis    Date/Time:  11/16/2024 6:30 AM    Performed by: Chuyita Galvan MD  Authorized by: Chuyita Galvan MD    Patient location:  ICU  Other Assisting Provider: No    Consent:     Consent obtained:  Emergent situation  Universal protocol:     Patient identity confirmed:  Arm band  Pre-procedure details:     Initial or Subsequent Exam:  Initial    Procedure purpose:  Diagnostic    Indications: other (comment)      Indications comment:  Evaluate for hemorrhagic ascites    Preparation: Patient was prepped and draped in usual sterile fashion    Anesthesia (see MAR for exact dosages):     Anesthesia method:  None  Procedure details:     Needle gauge:  20    Ultrasound guidance: yes      Ultrasound image availability:  Not saved    Sterile ultrasound techniques: Sterile gel and sterile probe covers were used      Approach:  Percutaneous    Puncture site:  L lower quadrant    Fluid removed amount:  5 cc    Fluid appearance:  Yellow    Dressing:  Adhesive bandage  Post-procedure details:     Patient tolerance of procedure:  Tolerated well, no immediate complications  Post-procedure medication (see MAR for dosing):     Albumin replacement: No    CriticalCare Time    Date/Time: 11/16/2024 6:00 AM    Performed by: Chuyita Galvan MD  Authorized by: Chuyita Galvan MD    Critical care provider statement:     Critical care time (minutes):  200    Critical care time was exclusive of:  Teaching time and separately billable procedures and treating other patients    Critical care was necessary to treat or prevent imminent or life-threatening deterioration of the following conditions:  Circulatory failure, cardiac failure, respiratory failure, renal failure, shock, hepatic failure, dehydration and CNS failure or compromise (acute GI hemorrhage)    Critical care was time spent personally by me on the following activities:  Obtaining history from patient or surrogate, development of treatment  plan with patient or surrogate, discussions with consultants, evaluation of patient's response to treatment, examination of patient, review of old charts, re-evaluation of patient's condition, ordering and review of radiographic studies, ordering and review of laboratory studies and ordering and performing treatments and interventions    I assumed direction of critical care for this patient from another provider in my specialty: no        ED Medication and Procedure Management   None     There are no discharge medications for this patient.    No discharge procedures on file.  ED SEPSIS DOCUMENTATION   Time reflects when diagnosis was documented in both MDM as applicable and the Disposition within this note       Time User Action Codes Description Comment    11/16/2024  3:13 AM Payton Roberson Add [R57.8] Hemorrhagic shock (HCC)     11/16/2024  3:26 AM Mitchell-TesclaraeroGenesisChuyita Add [K70.31] Alcoholic cirrhosis of liver with ascites (HCC)     11/16/2024  3:27 AM Mitchell-TesclaraeroGenesisChuyita Modify [R57.8] Hemorrhagic shock (HCC)     11/16/2024  3:33 AM Medrano-Tesoriero Chuyita Add [E16.2] Hypoglycemia     11/16/2024  3:33 AM Medrano-Tesoriero, Chuyita Add [K76.82] Hepatic encephalopathy (HCC)     11/16/2024  3:33 AM Medrano-Tesoriero, Chuyita Add [D72.825] Bandemia     11/16/2024  3:33 AM Medrano-Tesoriero, Chuyita Add [E87.20] Lactic acidosis     11/16/2024  3:33 AM Medrano-Tesoriero, Chuyita Add [D72.829] Leukocytosis     11/16/2024  3:34 AM Medrano-Tesoriero, Chuyita Add [D69.6] Thrombocytopenia (HCC)     11/16/2024  3:34 AM Medrano-Tesoriero, Chuyita Add [D68.9] Coagulopathy (HCC)     11/16/2024  3:34 AM Medrano-Tesoriero, Chuyita Add [E87.20] Metabolic acidosis     11/16/2024  3:35 AM Medrano-Tesoriero, Chuyita Add [N17.9] GLORY (acute kidney injury) (HCC)     11/16/2024  3:35 AM Chuyita Galvan Add [E80.6] Hyperbilirubinemia     11/16/2024  3:36 AM  Chuyita Galvan [R74.01] Transaminitis     11/16/2024  3:37 AM Chuyita Galvan [G93.40] Acute encephalopathy     11/16/2024  3:37 AM Chuyita Galvan [K76.6] Portal hypertension (HCC)     11/16/2024  4:47 AM Chuyita Galvan [T68.XXXA] Hypothermia, initial encounter     11/16/2024  4:47 AM Chuyita Galvan [K92.2] Upper GI bleed                  Chuyita Galvan MD  11/16/24 0958

## 2024-11-16 NOTE — ASSESSMENT & PLAN NOTE
Ammonia level 449 on admission  N.p.o. at present, given severe portal hypertension would avoid rectal lactulose until cleared by GI  Gastroenterology consulted

## 2024-11-16 NOTE — NUTRITION
11/16/24 0944   Biochemical Data,Medical Tests, and Procedures   Biochemical Data/Medical Tests/Procedures Lab values reviewed;Meds reviewed   Labs (Comment) 11/16/2024 hemoglobin 10.3, hematocrit 34, lactic acid 23.4, magnesium 1.6, phosphorus 8.7, potassium 5.8, BUN 30, creatinine 1.79, calcium 7.3, AST 4388, , total bilirubin 4.13   Meds (Comment) Magnesium sulfate, Protonix, norepinephrine, octreotide, phenylephrine, sodium bicarbonate, vasopressin, Versed   Nutrition-Focused Physical Exam   Nutrition-Focused Physical Exam Findings RN skin assessment reviewed;No edema documented;No skin issues documented   Medical-Related Concerns EtOH abuse.  Alcoholic cirrhosis, tobacco abuse, ascites   Adequacy of Intake   Nutrition Modality NPO   Feeding Route   PO NPO   Propofol mL/hr   (Not running at this time)   Current PO Intake   Current Diet Order NPO   Current Meal Intake Inadequate   Estimated calorie intake compared to estimated need Nutrient needs are not met at present   PES Statement   Problem Intake   Energy Balance (1) Inadequate energy intake NI-1.2   Related to Other (Comment)  (Acute clinical condition)   As evidenced by: Intake < estimated needs   Recommendations/Interventions   Malnutrition/BMI Present No  (Does not meet 2 criteria)   Summary Ventilator in use.  Presents in state of hemorrhagic shock.  Intubated 11/16.  Past medical history significant for EtOH abuse.  Alcoholic cirrhosis, tobacco abuse, ascites.  Weight history reviewed.  No significant changes.  No edema.  No pressure areas.  NPO at present.  Recommend holding off on TF initiation until patient requires 2 or less pressors and a lactic acid <2.  When clinically indicated to initiate tube feeding, recommend goal of Jevity 1.5 at 50 mL/h X 20 hours.  Start at 20 mL/h, titrating up by 10 mL every 4 hours to goal.  Prosource protein liquid twice daily.  At goal with Prosource -provides 1620 kcals, 94 g protein, 760 mL free water  and 1000 mL total volume.  Flushes per MD.  RD to follow as hospital course progresses.   Interventions/Recommendations Monitor I & O's   Education Assessment   Education Patient/caregiver not appropriate for education at this time   Patient Nutrition Goals   Goal Nutrition via appropriate route   Goal Status Initiated   Timeframe to complete goal by next f/u   Nutrition Complexity Risk   Nutrition complexity level High risk   Follow up date 11/20/24

## 2024-11-16 NOTE — ASSESSMENT & PLAN NOTE
Likely multifactorial to include low flow secondary to shock, metabolic encephalopathy, alcoholic encephalopathy  Delirium precautions  Serial neuroexams  Tox screen  Maintain cerebral perfusion with mean arterial pressures greater than 65  Support oxygen carrying capacity and perfusion with transfusions as needed.  Monitor for signs of alcohol withdrawal

## 2024-11-17 PROBLEM — E87.29 HIGH ANION GAP METABOLIC ACIDOSIS: Status: RESOLVED | Noted: 2024-01-01 | Resolved: 2024-01-01

## 2024-11-17 NOTE — RESPIRATORY THERAPY NOTE
11/16/24 2331   Respiratory Assessment   General Appearance Sedated   Respiratory Pattern Assisted   Chest Assessment Chest expansion symmetrical   R Breath Sounds Coarse;Scattered   L Breath Sounds Clear   Resp Comments no acute changes   O2 Device ventilator   Vent Information   Vent ID [id number]   Vent type Bella C3   Bella Vent Mode APVcmv   $ Pulse Oximetry Spot Check Charge Completed   SpO2 97 %   APVcmv Settings   Resp Rate (BPM) 24 BPM   VT (mL) 450 mL   Insp Time (sec) 1 sec   FIO2 (%) 80 %   PEEP (cmH2O) 8 cmH2O   Insp Resistance 16  (obs)   P-ramp (ms) 100 (ms)   Flow Trigger (LPM) 2 LPM   Humidification Heater   Heater Temp 98.6 °F (37 °C)   APVcmv Actuals   Resp Rate (BPM) 24 BPM   VT (mL) 426 mL   MV (Obs) 10.2   MAP (cmH2O) 14 cmH2O   Peak Pressure (cmH2O) 24 cmH2O   I:E Ratio (Obs) 1:1.5   Static Compliance (mL/cmH20) 39.7 mL/cmH2O   Plateau Pressure (cm H2O) 20.31 cm H2O   Heater Temperature (Obs) 98.4 °F (36.9 °C)   APVcmv ALARMS   High Peak Pressure (cmH2O) 45 cmH2O   Low Peak Pressure (cmH2O) 5 cm H2O   High Resp Rate (BPM) 40 BPM   High MV (L/min) 15 L/min   Low MV (L/min) 4 L/min   High VT (mL) 700 mL   Low VT (mL) 200 mL   Maintenance   Alarm (pink) cable attached No   Resuscitation bag with peep valve at bedside Yes   Water bag changed No   Circuit changed No   IHI Ventilator Associated Pneumonia Bundle   Head of Bed Elevated HOB 30   ETT  Hi-Lo;Cuffed 8 mm   Placement Date/Time: 11/16/24 (c) 2226   Type: Hi-Lo;Cuffed  Tube Size: 8 mm  Location: Oral  Insertion attempts: 2  Placement Verification: Chest x-ray;End tidal CO2   Secured at (cm) 24   Measured from Teeth   Secured Location (S)  Left   Repositioned Center to Left   Secured by Commercial tube irizarry   Site Condition Dry   Cuff Pressure (color) Green   HI-LO Suction  Continuous low suction   HI-LO Secretions Scant   HI-LO Intervention Patent

## 2024-11-17 NOTE — RESPIRATORY THERAPY NOTE
11/16/24 2020   Respiratory Assessment   General Appearance Sedated   Respiratory Pattern Assisted   Chest Assessment Chest expansion symmetrical   Bilateral Breath Sounds Coarse;Rhonchi   Suction ET Tube   Resp Comments pt remains intubated and mechanically ventilated   O2 Device ventilator   Vent Information   Vent ID [id number]   Vent type Bella C3   Bella Vent Mode APVcmv   Is the patient reintubated? No   $ Pulse Oximetry Spot Check Charge Completed   SpO2 94 %   APVcmv Settings   Resp Rate (BPM) 24 BPM   VT (mL) 450 mL   FIO2 (%) 80 %   PEEP (cmH2O) 8 cmH2O   Insp Resistance 10  (obs)   Flow Trigger (LPM) 2 LPM   Humidification Heater   Heater Temp 98.6 °F (37 °C)   APVcmv Actuals   Resp Rate (BPM) 24 BPM   VT (mL) 544 mL   MV (Obs) 11.1   Peak Pressure (cmH2O) 17 cmH2O   I:E Ratio (Obs) 1:1.5   Static Compliance (mL/cmH20) 46.6 mL/cmH2O   Plateau Pressure (cm H2O) 19.62 cm H2O   Heater Temperature (Obs) 98.8 °F (37.1 °C)   APVcmv ALARMS   High Peak Pressure (cmH2O) 45 cmH2O   Low Peak Pressure (cmH2O) 5 cm H2O   High Resp Rate (BPM) 40 BPM   High MV (L/min) 15 L/min   Low MV (L/min) 4 L/min   High VT (mL) 700 mL   Low VT (mL) 200 mL   Maintenance   Alarm (pink) cable attached No   Resuscitation bag with peep valve at bedside Yes   Water bag changed No   Circuit changed No   IHI Ventilator Associated Pneumonia Bundle   Head of Bed Elevated HOB 30   ETT  Hi-Lo;Cuffed 8 mm   Placement Date/Time: 11/16/24 (c) 0571   Type: Hi-Lo;Cuffed  Tube Size: 8 mm  Location: Oral  Insertion attempts: 2  Placement Verification: Chest x-ray;End tidal CO2   Secured at (cm) 24   Measured from Teeth   Secured Location (S)  Center   Repositioned Left to Center   Secured by Commercial tube irizarry  (changed out with new one)   Site Condition Dry   Cuff Pressure (cm H2O)   (MLT)   Cuff Pressure (color) Green   HI-LO Suction  Continuous low suction   HI-LO Secretions Scant   HI-LO Intervention Patent

## 2024-11-17 NOTE — PROGRESS NOTES
Progress Note - Gastroenterology   Name: Duncan Fan y.o. male I MRN: [id number]  Unit/Bed#: ICU 11 I Date of Admission: 11/16/2024   Date of Service: 11/17/2024 I Hospital Day: 1  Progress Note  Duncan Fan y.o. male MRN: [id number]  Unit/Bed#: ICU 11 Encounter: [id number]    Assessment & Plan     Hemorrhagic shock  Variceal bleed  Alcohol cirrhosis  Ischemic hepatitis  Lactic acidosis     - Patient has a hx of decompensated alcohol cirrhosis complicated by HE, ascites who was admitted with hemorrhagic shock with hypotension and HGB of 4.7 on presentation.  - Code Crimson was initiated in the ED yesterday upon presentation.  - He was also found to have significantly elevated LFTs c/w ischemic hepatitis and a severe lactic acidosis.  - Emergent EGD was performed yesterday morning. EGD showed 4 columns of grade 3 esophageal varices with active bleeding s/p banding x 6 and a large amount of blood in the stomach.  - HGB is stable at 10.3 this am. Lactic acid is trending down from 25.2 to 3.  - He is currently intubated/sedated in the ICU. He is being weaned off vasopressors.  - MELD 3.0: 35 at 11/17/2024  5:09 AM. MELD-Na: 34 at 11/17/2024  5:09 AM.  - Continue Octreotide gtt.  - Pantoprazole 40mg IV BID  - Ceftriaxone for SBP prophylaxis.  - Abdominal ultrasound and doppler ordered.  - Lactulose enemas.  - Monitor patient and labs. ICU care.  - Will plan for repeat EGD on Monday to reassess.    Subjective:  Patient is currently intubated and sedated in the ICU.  He is being weaned off vasopressors.    Objective:     Vitals:   Vitals:    11/17/24 0900   BP: 126/69   Pulse: 100   Resp: 17   Temp: 99.5 °F (37.5 °C)   SpO2: 95%   ,Body mass index is 23.6 kg/m².      Intake/Output Summary (Last 24 hours) at 11/17/2024 0941  Last data filed at 11/17/2024 0800  Gross per 24 hour   Intake 4555.16 ml   Output 1377 ml   Net 3178.16 ml       Physical Exam:     General Appearance: Intubated  Lungs: Clear to  auscultation bilaterally, no rales or rhonchi, no labored breathing/accessory muscle use  Heart: Regular rate and rhythm, S1, S2 normal, no murmur, click, rub or gallop  Abdomen: Non-tender, distended; bowel sounds normal; no masses or no organomegaly  Extremities: No cyanosis    Invasive Devices       Central Venous Catheter Line  Duration             Introducer 11/16/24 Internal jugular Right 1 day    CVC Central Lines 11/16/24 Triple Right Internal jugular <1 day              Peripheral Intravenous Line  Duration             Peripheral IV 11/16/24 Dorsal (posterior);Right Hand 1 day    Peripheral IV 11/16/24 Dorsal (posterior);Left Hand <1 day              Arterial Line  Duration             Arterial Line 11/16/24 Radial 1 day              Drain  Duration             Urethral Catheter Straight-tip;Temperature probe 16 Fr. 1 day    Rectal Tube <1 day              Airway  Duration             ETT  Hi-Lo;Cuffed 8 mm 1 day                    Lab Results:  No results displayed because visit has over 200 results.          Imaging Studies:   I have personally reviewed pertinent imaging studies.    XR chest portable ICU  Result Date: 11/16/2024  Narrative: XR CHEST PORTABLE ICU INDICATION: hypoxia. COMPARISON: CXR chest and abdomen CT 11/16/2024. FINDINGS: ET tube 3 cm above the raza. Right jugular introducer in right brachiocephalic vein. Developing patchy bilateral pneumonia. No pneumothorax or pleural effusion. Normal cardiomediastinal silhouette. Bones are unremarkable for age. Normal upper abdomen.     Impression: Lines and tubes as above with no pneumothorax. Developing patchy bilateral pneumonia. Workstation performed: [id number]     XR chest portable ICU  Result Date: 11/16/2024  Narrative: XR CHEST PORTABLE ICU INDICATION: ETT placement. COMPARISON: CXR and chest CT 11/16/2024. FINDINGS: ET tube 5 cm above the raza. Right jugular introducer and right jugular vein. Mild pulmonary venous congestion. No  pneumothorax or pleural effusion. Normal cardiomediastinal silhouette. Bones are unremarkable for age. Normal upper abdomen.     Impression: ET tube 5 cm above the raza. Mild pulmonary venous congestion. Pneumonia not excluded in the appropriate clinical setting. Workstation performed: [id number]     XR chest 1 view portable  Result Date: 11/16/2024  Narrative: XR CHEST PORTABLE INDICATION: line placement. COMPARISON: CT from the same date FINDINGS: Right IJ central line projects over the proximal SVC. Clear lungs. No pneumothorax or pleural effusion. Normal cardiomediastinal silhouette. Bones are unremarkable for age. Normal upper abdomen.     Impression: No acute cardiopulmonary disease. Workstation performed: [id number]     EGD  Result Date: 11/16/2024  Narrative: Table formatting from the original result was not included. [facility] [address] [place] [id number] [phone] DATE OF SERVICE: 11/16/24 PHYSICIAN(S): Attending: Indio Manrique III, MD Fellow: No Staff Documented INDICATION: Upper GI bleed POST-OP DIAGNOSIS: See the impression below. PREPROCEDURE: Informed consent was obtained for the procedure, including sedation.  Risks of perforation, hemorrhage, adverse drug reaction and aspiration were discussed. The patient was placed in the left lateral decubitus position. Patient was explained about the risks and benefits of the procedure. Risks including but not limited to bleeding, infection, and perforation were explained in detail. Also explained about less than 100% sensitivity with the exam and other alternatives. PROCEDURE: EGD DETAILS OF PROCEDURE: Patient was taken to the procedure room where a time out was performed to confirm correct patient and correct procedure. The patient underwent monitored anesthesia care, which was administered by an anesthesia professional. The patient's blood pressure, heart rate, level of consciousness, respirations, oxygen, ECG and  ETCO2 were monitored throughout the procedure. The scope was introduced through the mouth and advanced to the second part of the duodenum. Retroflexion was performed in the fundus. The patient experienced no blood loss. The procedure was not difficult. The patient tolerated the procedure well. There were no apparent adverse events. ANESTHESIA INFORMATION: ASA: ASA status not filed in the log. Anesthesia Type: Anesthesia type not filed in the log. MEDICATIONS: fentanyl citrate (PF) 100 MCG/2ML **ADS Override Pull** 100 mcg (Totals for administrations occurring from 0654 to 0716 on 11/16/24) FINDINGS: Multiple large grade III varices in the middle third of the esophagus and lower third of the esophagus; there was spurting blood; placed 6 bands successfully. The site of variceal bleed in the distal esophagus was banded and hemostasis was achieved Significant amount of fresh blood and blood clotting in the stomach, which obscured visualization. The stomach was poorly examined due to the presence of blood SPECIMENS: * No specimens in log *     Impression: 4 columns of grade 3 esophageal varices with active variceal hemorrhage status post banding x 6 with hemostasis achieved A large amount of blood in the stomach prevented adequate examination RECOMMENDATION: Continue octreotide drip Continue Protonix drip Antibiotics for primary prophylaxis of SBP No NGT He will require another endoscopy on Monday   Indio Manrique III, MD     Paracentesis  Result Date: 11/16/2024  Narrative: Chuyita Galvan MD     11/16/2024  9:58 AM Paracentesis Date/Time: 11/16/2024 6:30 AM Performed by: Chuyita Galvan MD Authorized by: Chuyita Galvan MD  Patient location:  ICU Other Assisting Provider: No  Consent:   Consent obtained:  Emergent situation Universal protocol:   Patient identity confirmed:  Arm band Pre-procedure details:   Initial or Subsequent Exam:  Initial   Procedure purpose:   Diagnostic   Indications: other (comment)    Indications comment:  Evaluate for hemorrhagic ascites   Preparation: Patient was prepped and draped in usual sterile fashion  Anesthesia (see MAR for exact dosages):   Anesthesia method:  None Procedure details:   Needle gauge:  20   Ultrasound guidance: yes    Ultrasound image availability:  Not saved   Sterile ultrasound techniques: Sterile gel and sterile probe covers were used    Approach:  Percutaneous   Puncture site:  L lower quadrant   Fluid removed amount:  5 cc   Fluid appearance:  Yellow   Dressing:  Adhesive bandage Post-procedure details:   Patient tolerance of procedure:  Tolerated well, no immediate complications Post-procedure medication (see MAR for dosing):   Albumin replacement: No      TRAUMA - CT chest abdomen pelvis w contrast  Result Date: 11/16/2024  Narrative: CT CHEST, ABDOMEN AND PELVIS WITH IV CONTRAST INDICATION: TRAUMA. COMPARISON: 12/30/2018 TECHNIQUE: CT examination of the chest, abdomen and pelvis was performed. Multiplanar 2D reformatted images were created from the source data. This examination, like all CT scans performed in the [facility], was performed utilizing techniques to minimize radiation dose exposure, including the use of iterative reconstruction and automated exposure control. Radiation dose length product (DLP) for this visit: 1058 mGy-cm IV Contrast: 100 mL of iohexol (OMNIPAQUE) Enteric Contrast: Not administered. FINDINGS: CHEST Exam limited by motion artifact. LUNGS: Central airways are patent. No tracheal or endobronchial lesion. Mild pleural-parenchymal scarring in the right lung apex. Nonspecific subpleural 4 mm nodule in the right lung apex. 1.5 cm nonspecific nodular density in the left lower lobe. Additional subcentimeter subpleural nodular densities in the left lower lobe. No lobar airspace consolidation. Extensive dependent atelectasis and/or early airspace disease in the bilateral lung bases.  PLEURA: No pneumothorax or pleural effusions HEART/GREAT VESSELS: Heart is unremarkable for patient's age. No pericardial effusion. No thoracic aortic aneurysm/dissection. No evidence of acute vascular injury. Visualized proximal thoracic vessels are patent with normal course and caliber. Scattered  calcific atherosclerosis of the thoracic aorta and proximal thoracic great vessels. MEDIASTINUM AND GERI: Unremarkable without mediastinal mass/hematoma, fluid collection, or lymphadenopathy. The visualized thyroid glands unremarkable. Esophagus is distended with air and fluid. Paraesophageal varices along the mid to distal esophagus noted likely sequelae of portal hypertension. CHEST WALL AND LOWER NECK: Unremarkable. ABDOMEN LIVER/BILIARY TREE: Severe diffuse hepatic steatosis. Shrunken nodular contour of the liver also seen suggesting changes of advanced cirrhosis. An area of increased attenuation along the left hepatic lobe and anterior right hepatic lobe may represent focal fibrosis. No suspicious liver mass identified. No biliary dilation. GALLBLADDER: Contracted without abnormal wall thickening or calcified gallstones. No pericholecystic inflammatory change. SPLEEN: Unremarkable. PANCREAS: Unremarkable. ADRENAL GLANDS: Unremarkable. KIDNEYS/URETERS: No evidence of acute renal injury. No hydronephrosis or urinary tract calculi. Subcentimeter hypoattenuating right renal lesion, too small to characterize but statistically likely benign, which do not warrant follow-up (Radiology June 2019). STOMACH AND BOWEL: Stomach is markedly distended with fluid and debris. No gross intraluminal mass. Small and large bowel loops are normal in course and caliber without evidence for obstruction. Mild diffuse wall thickening of multiple abdominal/pelvic small bowel loops and diffuse wall thickening of the colon noted which could suggest a diffuse infectious/inflammatory enterocolitis versus hepatic enterocolopathy. Normal appendix  with inflammatory changes. APPENDIX: No findings to suggest appendicitis. ABDOMINOPELVIC CAVITY: Moderate to large amount of abdominal/pelvic ascites. No pneumoperitoneum. No lymphadenopathy by size criteria. VESSELS: Extensive calcific atherosclerosis of the aortoiliac arteries. Infrarenal abdominal aortic aneurysm measuring 3.5 x 3.3 cm noted. No evidence for aortic dissection. Recommend follow-up imaging every 3 years per current guidelines. Markedly dilated recanalized umbilical vein extending along the ventral abdominal wall with multiple dilated superficial abdominal varices are seen, increased in size compared to the prior study. Findings likely related to sequelae of portal hypertension. PELVIS REPRODUCTIVE ORGANS: Unremarkable for patient's age. URINARY BLADDER: Decompressed with Bernal catheter in place. ABDOMINAL WALL/INGUINAL REGIONS: Unremarkable. BONES: No gross acute displaced fracture or subluxation. Subtle nondisplaced fracture may not be evident on this exam due to motion artifact. No suspicious osseous lesion. Multilevel degenerative changes of the thoracolumbar spine, worse at the L1-2 and L5-S1 levels.     Impression: 1.  Exam limited by motion artifact. Cannot exclude subtle nondisplaced fractures given motion. Otherwise no acute displaced fracture or subluxation seen. 2.  Nonspecific subpleural 4 mm nodule in the right lung apex. 1.5 cm nonspecific nodular density in the left lower lobe. Additional subcentimeter subpleural nodular densities in the left lower lobe. Extensive dependent atelectasis and/or early airspace disease in the bilateral lung bases. 3.  Esophagus is distended with air and fluid. Paraesophageal varices along the mid to distal esophagus noted likely sequelae of portal hypertension. 4.  Sequelae of hepatic cirrhosis, steatosis, and portal hypertension. 5.  Markedly dilated recanalized umbilical vein extending along the ventral abdominal wall with multiple dilated superficial  abdominal varices are seen, increased in size compared to the prior study. Findings likely related to sequelae of portal hypertension. 6.  No evidence of bowel obstruction or free air. Moderate to large amount of abdominal/pelvic ascites. 7.  Infrarenal abdominal aortic aneurysm measuring 3.5 x 3.3 cm noted. No evidence for aortic dissection. Recommend follow-up imaging every 3 years per current guidelines. 8.  No evidence of intrathoracic or intra-abdominal/pelvic visceral injury or vascular injury. 9.  Mild diffuse wall thickening of multiple abdominal/pelvic small bowel loops and diffuse wall thickening of the colon noted which could suggest a diffuse infectious/inflammatory enterocolitis versus hepatic enterocolopathy. Workstation performed: [id number]     TRAUMA - CT spine cervical wo contrast  Result Date: 11/16/2024  Narrative: CT CERVICAL SPINE - WITHOUT CONTRAST INDICATION:   TRAUMA. COMPARISON:  None. TECHNIQUE:  CT examination of the cervical spine was performed without intravenous contrast.  Contiguous axial images were obtained. Multiplanar 2D reformatted images were created from the source data. Radiation dose length product (DLP) for this visit:  479 mGy-cm .  This examination, like all CT scans performed in the [facility], was performed utilizing techniques to minimize radiation dose exposure, including the use of iterative reconstruction and automated exposure control. IMAGE QUALITY:  Diagnostic. FINDINGS: ALIGNMENT:  Normal alignment of the cervical spine. No subluxation. VERTEBRAE:  No acute cervical spine fracture. Vertebral body heights are preserved. DEGENERATIVE CHANGES:  Moderate multilevel cervical degenerative changes are noted. No critical central canal stenosis. PREVERTEBRAL AND PARASPINAL SOFT TISSUES: Unremarkable THORACIC INLET: No pneumothorax, pleural effusions, or airspace consolidation/pulmonary contusions. Mild right apical pleural-parenchymal scarring.      Impression: Multilevel degenerative changes without acute cervical spine fracture or traumatic malalignment. Workstation performed: [id number]     TRAUMA - CT head wo contrast  Result Date: 11/16/2024  Narrative: CT BRAIN - WITHOUT CONTRAST INDICATION:   TRAUMA. COMPARISON: 6/24/2017 TECHNIQUE:  CT examination of the brain was performed.  Multiplanar 2D reformatted images were created from the source data. Radiation dose length product (DLP) for this visit:  862 mGy-cm .  This examination, like all CT scans performed in the [facility], was performed utilizing techniques to minimize radiation dose exposure, including the use of iterative reconstruction and automated exposure control. IMAGE QUALITY:  Diagnostic. FINDINGS: PARENCHYMA:No intracranial mass, mass effect or midline shift. No CT signs of acute infarction.  No acute parenchymal hemorrhage. VENTRICLES AND EXTRA-AXIAL SPACES: Mild central and frontal predominant cortical volume loss/atrophy. Basilar cisterns are patent and unremarkable. VISUALIZED ORBITS: Normal visualized orbits. PARANASAL SINUSES: Normal visualized paranasal sinuses. CALVARIUM AND EXTRACRANIAL SOFT TISSUES: Unremarkable     Impression: No acute intracranial hemorrhage or depressed calvarial fracture. Workstation performed: [id number]       Payal Felix PA-C  11/17/2024,9:41 AM

## 2024-11-17 NOTE — ASSESSMENT & PLAN NOTE
Hypotension with Hgb 4.7 on presentation with distended abd and altered mental status.  Code crimson initiated by emergency room, and again in the ICU.   PRBC-6, FFP-3, platelets -2 packs, cryoprecipitate.  Vitamin K 10 mg IV, PCC administered.   EGD revealed source to be briskly bleeding esophageal varices, hemostasis achieved.    Plan:  Continue to monitor hemoglobins every 6 hours and transfuse as needed  Further treatment for his alcoholic cirrhosis per gastroenterology  Continue octreotide and Protonix.

## 2024-11-17 NOTE — RESPIRATORY THERAPY NOTE
11/17/24 0330   Respiratory Assessment   General Appearance Sedated   Respiratory Pattern Assisted   Chest Assessment Chest expansion symmetrical   Bilateral Breath Sounds Coarse;Scattered   Resp Comments continue to monitor, wean as tolerated   O2 Device ventilator   Vent Information   Vent ID [id number]   Vent type Bella C3   Bella Vent Mode APVcmv   $ Pulse Oximetry Spot Check Charge Completed   SpO2 94 %   APVcmv Settings   Resp Rate (BPM) 20 BPM   VT (mL) 450 mL   Insp Time (sec) 1 sec   FIO2 (%) 80 %   PEEP (cmH2O) 8 cmH2O   Insp Resistance 17  (obs)   P-ramp (ms) 100 (ms)   Flow Trigger (LPM) 2 LPM   Humidification Heater   Heater Temp 98.6 °F (37 °C)   APVcmv Actuals   Resp Rate (BPM) 20 BPM   VT (mL) 425 mL   MV (Obs) 8.3   MAP (cmH2O) 14 cmH2O   Peak Pressure (cmH2O) 26 cmH2O   I:E Ratio (Obs) 1:2   Static Compliance (mL/cmH20) 31.8 mL/cmH2O   Plateau Pressure (cm H2O) 20.22 cm H2O   Heater Temperature (Obs) 98.6 °F (37 °C)   APVcmv ALARMS   High Peak Pressure (cmH2O) 45 cmH2O   Low Peak Pressure (cmH2O) 5 cm H2O   High Resp Rate (BPM) 40 BPM   High MV (L/min) 15 L/min   Low MV (L/min) 4 L/min   High VT (mL) 700 mL   Low VT (mL) 200 mL   Maintenance   Alarm (pink) cable attached No   Resuscitation bag with peep valve at bedside Yes   Water bag changed No   Circuit changed No   IHI Ventilator Associated Pneumonia Bundle   Head of Bed Elevated HOB 30   ETT  Hi-Lo;Cuffed 8 mm   Placement Date/Time: 11/16/24 (c) 0055   Type: Hi-Lo;Cuffed  Tube Size: 8 mm  Location: Oral  Insertion attempts: 2  Placement Verification: Chest x-ray;End tidal CO2   Secured at (cm) 24   Measured from Teeth   Secured Location (S)  Right   Repositioned Left to Right   Secured by Commercial tube irizarry   Site Condition Dry   Cuff Pressure (color) Green   HI-LO Suction  Continuous low suction   HI-LO Secretions Scant   HI-LO Intervention Patent

## 2024-11-17 NOTE — ASSESSMENT & PLAN NOTE
Likely multifactorial, to include low flow state, hepatorenal syndrome, hypovolemia  Aggressive resuscitation  Avoid nephrotoxics and hypotension  Continue to monitor renal indices and urinary output  Consider nephrology consultation for CRRT if renal function continues to deteriorate

## 2024-11-17 NOTE — RESPIRATORY THERAPY NOTE
RT Ventilator Management Note  Duncan Pearce 59 y.o. male MRN: [id number]  Unit/Bed#: ICU 11 Encounter: [id number]      Daily Screen         11/16/2024  0814 11/17/2024  0711          Patient safety screen outcome:: Failed Failed      Not Ready for Weaning due to:: Underline problem not resolved Underline problem not resolved;FiO2 >60%                Physical Exam:   Assessment Type: Assess only  General Appearance: Sedated  Respiratory Pattern: Assisted  Chest Assessment: Chest expansion symmetrical  Bilateral Breath Sounds: Coarse, Diminished  R Breath Sounds: Coarse, Scattered  L Breath Sounds: Clear  Suction: ET Tube  O2 Device: vent  Subjective Data: sedated      Resp Comments: Pt remains intubated and on full mechanical support  titrated fio2 to 70  no additional changes  skin integrity remains intact         11/17/24 0711   Respiratory Assessment   Assessment Type Assess only   General Appearance Sedated   Respiratory Pattern Assisted   Chest Assessment Chest expansion symmetrical   Bilateral Breath Sounds Coarse;Diminished   Suction ET Tube   Resp Comments Pt remains intubated and on full mechanical support  titrated fio2 to 70  no additional changes  skin integrity remains intact   O2 Device vent   Subjective Data sedated   Vent Information   Vent ID [id number]   Vent type Bella C3   Bella Vent Mode APVcmv   Is the patient reintubated? No   $ Vent Daily Charge-Subsequent Yes   $ Pulse Oximetry Spot Check Charge Completed   APVcmv Settings   Resp Rate (BPM) 16 BPM   VT (mL) 450 mL   %TI (%) 1 %   Insp Time (sec) 0.85 sec   FIO2 (%) (S)  70 %   PEEP (cmH2O) 8 cmH2O   I:E Ratio 1:1.5   Insp Resistance 13   P-ramp (ms) 100 (ms)   Flow Trigger (LPM) 2 LPM   Humidification Heater   Heater Temp 98.6 °F (37 °C)   APVcmv Actuals   Resp Rate (BPM) 17 BPM   VT (mL) 445 mL   MV (Obs) 7.1   MAP (cmH2O) 12 cmH2O   Peak Pressure (cmH2O) 21 cmH2O   I:E Ratio (Obs) 1:3.4   Static Compliance (mL/cmH20) 61.2 mL/cmH2O    Plateau Pressure (cm H2O) 20 cm H2O   Heater Temperature (Obs) 97.7 °F (36.5 °C)   APVcmv ALARMS   High Peak Pressure (cmH2O) 45 cmH2O   Low Peak Pressure (cmH2O) 5 cm H2O   High Resp Rate (BPM) 40 BPM   High MV (L/min) 15 L/min   Low MV (L/min) 4 L/min   High VT (mL) 700 mL   Low VT (mL) 200 mL   Apnea Time (s) 20 S   Maintenance   Alarm (pink) cable attached No   Resuscitation bag with peep valve at bedside Yes   Water bag changed Yes   Circuit changed No   Daily Screen   Patient safety screen outcome: Failed   Not Ready for Weaning due to: Underline problem not resolved;FiO2 >60%   IHI Ventilator Associated Pneumonia Bundle   Daily Assessment of Readiness to Extubate Yes   Head of Bed Elevated HOB 30   Oral Care Mouth suctioned   ETT  Hi-Lo;Cuffed 8 mm   Placement Date/Time: 11/16/24 (c) 5794   Type: Hi-Lo;Cuffed  Tube Size: 8 mm  Location: Oral  Insertion attempts: 2  Placement Verification: Chest x-ray;End tidal CO2   Secured at (cm) 24   Measured from Teeth   Secured Location Right   Repositioned (S)  Right to Center   Secured by Commercial tube irizarry   Site Condition Dry   Cuff Pressure (color) Green   HI-LO Suction  Continuous low suction   HI-LO Secretions Moderate;Blood tinged   HI-LO Intervention Patent

## 2024-11-17 NOTE — RESPIRATORY THERAPY NOTE
RT Ventilator Management Note  Duncan Jackcraft 59 y.o. male MRN: [id number]  Unit/Bed#: ICU 11 Encounter: [id number]      Daily Screen         11/16/2024  0814 11/17/2024  0711          Patient safety screen outcome:: Failed Failed      Not Ready for Weaning due to:: Underline problem not resolved Underline problem not resolved;FiO2 >60%                Physical Exam:   Assessment Type: Assess only  General Appearance: Sedated  Respiratory Pattern: Assisted  Chest Assessment: Chest expansion symmetrical  Bilateral Breath Sounds: Coarse  Suction: ET Tube  O2 Device: VENT  Subjective Data: Sedated      Resp Comments: titrated fio2 to 60%       11/17/24 1458   Respiratory Assessment   Assessment Type Assess only   General Appearance Sedated   Respiratory Pattern Assisted   Chest Assessment Chest expansion symmetrical   Bilateral Breath Sounds Coarse   Suction ET Tube   Resp Comments titrated fio2 to 60%   O2 Device VENT   Subjective Data Sedated   Vent Information   Vent ID [id number]   Vent type Bella C3   Bella Vent Mode APVcmv   $ Pulse Oximetry Spot Check Charge Completed   SpO2 96 %   APVcmv Settings   Resp Rate (BPM) 16 BPM   VT (mL) 450 mL   Insp Time (sec) 0.85 sec   FIO2 (%) (S)  60 %   PEEP (cmH2O) 8 cmH2O   I:E Ratio 1:3.4   Insp Resistance 3   P-ramp (ms) 100 (ms)   Flow Trigger (LPM) 2 LPM   Humidification Heater   Heater Temp 98.6 °F (37 °C)   APVcmv Actuals   Resp Rate (BPM) 17 BPM   VT (mL) 706 mL   MV (Obs) 11.6   MAP (cmH2O) 9.8 cmH2O   Peak Pressure (cmH2O) 15 cmH2O   I:E Ratio (Obs) 1:3.4   Static Compliance (mL/cmH20) 133 mL/cmH2O   Heater Temperature (Obs) 98.8 °F (37.1 °C)   APVcmv ALARMS   High Peak Pressure (cmH2O) 45 cmH2O   Low Peak Pressure (cmH2O) 5 cm H2O   High Resp Rate (BPM) 40 BPM   High MV (L/min) 15 L/min   Low MV (L/min) 4 L/min   High VT (mL) 950 mL   Low VT (mL) 200 mL   Apnea Time (s) 20 S   Maintenance   Alarm (pink) cable attached No   Resuscitation bag with peep valve at  bedside Yes   Water bag changed No   Circuit changed No   ETT  Hi-Lo;Cuffed 8 mm   Placement Date/Time: 11/16/24 (c) 8623   Type: Hi-Lo;Cuffed  Tube Size: 8 mm  Location: Oral  Insertion attempts: 2  Placement Verification: Chest x-ray;End tidal CO2   Secured at (cm) 25   Measured from Teeth   Secured Location Center   Repositioned (S)  Center to Left   Secured by Commercial tube irizarry   Site Condition Dry   Cuff Pressure (color) Green   HI-LO Suction  Continuous low suction   HI-LO Secretions Small;Blood tinged   HI-LO Intervention Patent

## 2024-11-17 NOTE — PROGRESS NOTES
Progress Note - Critical Care/ICU   Name: Duncan Pearce 59 y.o. male I MRN: [id number]  Unit/Bed#: ICU 11 I Date of Admission: 11/16/2024   Date of Service: 11/17/2024 I Hospital Day: 1      Assessment & Plan  Hemorrhagic shock (HCC)  Hypotension with Hgb 4.7 on presentation with distended abd and altered mental status.  Code crimson initiated by emergency room, and again in the ICU.   PRBC-6, FFP-3, platelets -2 packs, cryoprecipitate.  Vitamin K 10 mg IV, PCC administered.   EGD revealed source to be briskly bleeding esophageal varices, hemostasis achieved.    Plan:  Continue to monitor hemoglobins every 6 hours and transfuse as needed  Further treatment for his alcoholic cirrhosis per gastroenterology  Continue octreotide and Protonix.  ETOH abuse  Continue thiamine, folic acid, multivitamin intravenously daily.  Monitor closely for signs of withdrawal  CIWA protocol when appropriate  ABLA (acute blood loss anemia)  On ER presentation, hemoglobin 4.7 with severe hypotension requiring code Crimson to stabilize  Patient required second code Crimson as he decompensated a second time after admission to the ICU.  Emergent EGD revealed esophageal varices with active brisk bleeding, hemostasis achieved.  Patient required multiple blood products including packed red blood cells, FFP, platelets, and cryoprecipitate  Monitoring hemoglobins every 6 hours.  Transfuse as needed    Alcoholic cirrhosis (HCC)  Patient with long history of alcoholic cirrhosis, portal hypertension, and multiple varices.  Admission MELD score 29  Resultant thrombocytopenia, transaminitis, and hypoglycemia.  Gastroenterology following  GLORY (acute kidney injury) (HCC)  Likely multifactorial, to include low flow state, hepatorenal syndrome, hypovolemia  Aggressive resuscitation  Avoid nephrotoxics and hypotension  Continue to monitor renal indices and urinary output  Consider nephrology consultation for CRRT if renal function continues to  deteriorate  Transaminitis  Related to alcoholic cirrhosis and likely exacerbated by hypotension  Continue to trend hepatic functions  Thrombocytopenia (HCC)  Likely related to liver disease  Platelet transfusions as needed as part of balance transfusion  Hyperammonemia (HCC)  Ammonia level 449 on admission, increased to 487  Lactulose enemas in attempt to drop ammonia levels  Lack of gastric conduit for enteral administration  Encephalopathy  Likely multifactorial to include low flow secondary to shock, metabolic encephalopathy, alcoholic encephalopathy  Delirium precautions  Serial neuroexams  Maintain cerebral perfusion with mean arterial pressures greater than 65  Support oxygen carrying capacity and perfusion with transfusions as needed.  Monitor for signs of alcohol withdrawal      GI bleed  Secondary to actively bleeding esophageal varices  Gastroenterology following  Continue octreotide and Protonix  Monitor hemoglobins and transfuse as needed    Hypoglycemia  Alcoholic cirrhosis likely causing impaired gluconeogenesis  Support glucose as needed  Coagulopathy (HCC)  Related to his alcoholic cirrhosis  Continue to attempt to correct coagulopathy while actively bleeding  Acute respiratory failure (HCC)  Patient required emergent intubation and mechanical ventilation due to hypoxia and need for airway protection.  Follow ABGs, CXR as needed.  Wean ventilator as tolerated.  Bronchodilators as needed.  VAP prophylaxis.    Disposition: Critical care    ICU Core Measures     Vented Patient  VAP Bundle  VAP bundle ordered     A: Assess, Prevent, and Manage Pain Has pain been assessed? Yes  Need for changes to pain regimen? No   B: Both Spontaneous Awakening Trials (SATs) and Spontaneous Breathing Trials (SBTs) Plan to perform spontaneous awakening trial today? Yes   Plan to perform spontaneous breathing trial today? Yes   Obvious barriers to extubation? No   C: Choice of Sedation RASS Goal: -1 Drowsy  Need for  changes to sedation or analgesia regimen? No   D: Delirium CAM-ICU: Unable to perform secondary to Acute cognitive dysfunction   E: Early Mobility  Plan for early mobility? Yes   F: Family Engagement Plan for family engagement today? Yes       Antibiotic Review: Continue broad spectrum secondary to severity of illness.     Review of Invasive Devices:    [place] Plan: Continue for accurate I/O monitoring for 48 hours  Central access plan: Medications requiring central line Hemodynamic monitoring  [place] Plan: Keep arterial line for hemodynamic monitoring, frequent ABGs, and frequent labs    Prophylaxis:  VTE Contraindicated secondary to: GI Bleed   Stress Ulcer  covered bypantoprazole (PROTONIX) injection 40 mg [[id number]]         24 Hour Events : Hemostasis achieved via EGD yesterday morning.  Patient's hemoglobin is stabilized appropriately with transfusions.  Remains encephalopathic with minimal pressor requirements.  Renal function appears to be waning, may require nephrology consultation today.  Subjective       Objective :                   Vitals I/O      Most Recent Min/Max in 24hrs   Temp 98.1 °F (36.7 °C) Temp  Min: 92 °F (33.3 °C)  Max: 98.4 °F (36.9 °C)   Pulse 87 Pulse  Min: 87  Max: 108   Resp (!) 24 Resp  Min: 19  Max: 32   /71 BP  Min: 65/39  Max: 186/90   O2 Sat 97 % SpO2  Min: 87 %  Max: 100 %      Intake/Output Summary (Last 24 hours) at 11/17/2024 0052  Last data filed at 11/17/2024 0001  Gross per 24 hour   Intake 6652.39 ml   Output 937 ml   Net 5715.39 ml       Diet NPO    Invasive Monitoring           Physical Exam   Physical Exam  Vitals and nursing note reviewed.   Eyes:      General: Scleral icterus present.      Conjunctiva/sclera: Conjunctivae normal.   Skin:     General: Skin is warm and dry.      Coloration: Skin is jaundiced.   HENT:      Head: Normocephalic and atraumatic.      Mouth/Throat:      Mouth: Mucous membranes are moist.   Neck:      Vascular: JVD present.    Cardiovascular:      Rate and Rhythm: Normal rate and regular rhythm.      Pulses: Normal pulses.      Heart sounds: Normal heart sounds.   Musculoskeletal:      Right lower leg: Trace Edema present.      Left lower leg: Trace Edema present.   Abdominal: General: Abdomen is protuberant. Bowel sounds are increased. There is distension.     Palpations: Abdomen is soft.      Tenderness: There is no guarding.   Constitutional:       General: He is not in acute distress.     Appearance: He is toxic-appearing.      Interventions: He is sedated, intubated and restrained.   Pulmonary:      Effort: No accessory muscle usage, respiratory distress or accessory muscle usage. He is intubated.      Breath sounds: Rhonchi present.   Secretions are normal.Chest:      Chest wall: No tenderness.          Diagnostic Studies        Lab Results: I have reviewed the following results:     Medications:  Scheduled PRN   Albumin 5%, 25 g, Once  calcium gluconate, 2 g, Once  calcium gluconate, 2 g, Once  cefTRIAXone, 1,000 mg, Q12H  chlorhexidine, 15 mL, Q12H STACI  folic acid 1 mg, thiamine (VITAMIN B1) 100 mg in sodium chloride 0.9 % 100 mL IV piggyback, , Daily  lactulose, 200 g, Q6H  metoclopramide, 10 mg, Once  metroNIDAZOLE, 500 mg, Q8H  pantoprazole, 40 mg, Q12H STACI      midazolam, 2 mg, Q1H PRN       Continuous    fentaNYL, 100 mcg/hr, Last Rate: 100 mcg/hr (11/16/24 1748)  norepinephrine, 1-30 mcg/min, Last Rate: Stopped (11/16/24 1534)  octreotide, 50 mcg/hr, Last Rate: 50 mcg/hr (11/16/24 2325)  vasopressin, 0.04 Units/min, Last Rate: 0.04 Units/min (11/16/24 2106)         Labs:   CBC    Recent Labs     11/16/24  0129 11/16/24  0538 11/16/24  1808 11/17/24  0013   WBC 22.26*   < > 18.53* 17.09*   HGB 4.7*   < > 9.9* 10.3*   HCT 19.5*   < > 30.5* 31.5*   *   < > 73* 67*   BANDSPCT 20*  --   --   --     < > = values in this interval not displayed.     BMP    Recent Labs     11/16/24  1219 11/16/24 1808   SODIUM 141 140   K  4.7 4.0   CL 99 98   CO2 25 29   AGAP 17* 13   BUN 29* 31*   CREATININE 1.90* 2.25*   CALCIUM 8.5 8.4       Coags    Recent Labs     11/16/24  0129 11/16/24  0538 11/16/24  1219   INR 3.69* 2.65* 1.94*   PTT 40* 42*  --         Additional Electrolytes  Recent Labs     11/16/24  0129 11/16/24  0538 11/16/24  1007 11/16/24  1808 11/17/24  0013   MG 1.7* 1.6*  --   --   --    PHOS  --  8.7*  --   --   --    CAIONIZED  --   --    < > 0.99* 1.08*    < > = values in this interval not displayed.          Blood Gas    Recent Labs     11/17/24  0020   PHART 7.499*   PSO0RON 34.4*   PO2ART 107.6   TPQ9AVX 26.2   BEART 3.1   SOURCE Line, Arterial     Recent Labs     11/16/24  1322 11/16/24  1810 11/17/24  0020   PHVEN 7.310  --   --    CTX6YNF 35.2*  --   --    PO2VEN 52.0*  --   --    AHA6YLD 17.3*  --   --    BEVEN -8.1  --   --    R2BFXKP 84.2*  --   --    SOURCE  --    < > Line, Arterial    < > = values in this interval not displayed.    LFTs  Recent Labs     11/16/24  1219 11/16/24  1808   ALT 1,027* 1,127*   AST 6,213* 7,596*   ALKPHOS 240* 254*   ALB 3.6 3.2*   TBILI 5.35* 5.89*       Infectious  Recent Labs     11/16/24  0406   PROCALCITONI 0.39*     Glucose  Recent Labs     11/16/24  0538 11/16/24  0917 11/16/24  1219 11/16/24  1808   GLUC 124 147* 155* 182*

## 2024-11-17 NOTE — ASSESSMENT & PLAN NOTE
Likely multifactorial to include low flow secondary to shock, metabolic encephalopathy, alcoholic encephalopathy  Delirium precautions  Serial neuroexams  Maintain cerebral perfusion with mean arterial pressures greater than 65  Support oxygen carrying capacity and perfusion with transfusions as needed.  Monitor for signs of alcohol withdrawal

## 2024-11-17 NOTE — ASSESSMENT & PLAN NOTE
Patient required emergent intubation and mechanical ventilation due to hypoxia and need for airway protection.  Follow ABGs, CXR as needed.  Wean ventilator as tolerated.  Bronchodilators as needed.  VAP prophylaxis.

## 2024-11-17 NOTE — RESPIRATORY THERAPY NOTE
RT Ventilator Management Note  Duncan Pearce 59 y.o. male MRN: [id number]  Unit/Bed#: ICU 11 Encounter: [id number]      Daily Screen         11/16/2024  0814 11/17/2024  0711          Patient safety screen outcome:: Failed Failed      Not Ready for Weaning due to:: Underline problem not resolved Underline problem not resolved;FiO2 >60%                Physical Exam:   Assessment Type: Assess only  General Appearance: Sedated  Respiratory Pattern: Assisted  Chest Assessment: Chest expansion symmetrical  Bilateral Breath Sounds: Coarse  R Breath Sounds: Coarse, Scattered  L Breath Sounds: Clear  Suction: ET Tube  O2 Device: vent  Subjective Data: sedated      Resp Comments: no changes at this time       11/17/24 1103   Respiratory Assessment   Assessment Type Assess only   General Appearance Sedated   Respiratory Pattern Assisted   Chest Assessment Chest expansion symmetrical   Bilateral Breath Sounds Coarse   Suction ET Tube   Resp Comments no changes at this time   O2 Device vent   Subjective Data sedated   Vent Information   Vent ID [id number]   Vent type Bella C3   Bella Vent Mode APVcmv   $ Pulse Oximetry Spot Check Charge Completed   SpO2 94 %   APVcmv Settings   Resp Rate (BPM) 16 BPM   VT (mL) 450 mL   Insp Time (sec) 0.85 sec   FIO2 (%) 70 %   PEEP (cmH2O) 8 cmH2O   I:E Ratio 1:1.5   Insp Resistance 11   P-ramp (ms) 100 (ms)   Flow Trigger (LPM) 2 LPM   Humidification Heater   Heater Temp 98.6 °F (37 °C)   APVcmv Actuals   Resp Rate (BPM) 17 BPM   VT (mL) 439 mL   MV (Obs) 7.4   MAP (cmH2O) 11 cmH2O   Peak Pressure (cmH2O) 20 cmH2O   I:E Ratio (Obs) 1:3   Static Compliance (mL/cmH20) 139 mL/cmH2O   Heater Temperature (Obs) 98.6 °F (37 °C)   APVcmv ALARMS   High Peak Pressure (cmH2O) 45 cmH2O   Low Peak Pressure (cmH2O) 5 cm H2O   High Resp Rate (BPM) 40 BPM   High MV (L/min) 15 L/min   Low MV (L/min) 4 L/min   High VT (mL) 700 mL   Low VT (mL) 200 mL   Apnea Time (s) 20 S   Maintenance   Alarm (pink) cable  attached No   Resuscitation bag with peep valve at bedside Yes   Water bag changed No   Circuit changed No   ETT  Hi-Lo;Cuffed 8 mm   Placement Date/Time: 11/16/24 (c) 7191   Type: Hi-Lo;Cuffed  Tube Size: 8 mm  Location: Oral  Insertion attempts: 2  Placement Verification: Chest x-ray;End tidal CO2   Secured at (cm) 24   Measured from Teeth   Secured Location Center   Secured by Commercial tube irizarry   Site Condition Dry   Cuff Pressure (color) Green   HI-LO Suction  Continuous low suction   HI-LO Secretions Small;Blood tinged   HI-LO Intervention Flushed

## 2024-11-17 NOTE — PLAN OF CARE
Problem: SAFETY,RESTRAINT: NV/NON-SELF DESTRUCTIVE BEHAVIOR  Goal: Remains free of harm/injury (restraint for non violent/non self-detsructive behavior)  Description: INTERVENTIONS:  - Instruct patient/family regarding restraint use   - Assess and monitor physiologic and psychological status   - Provide interventions and comfort measures to meet assessed patient needs   - Identify and implement measures to help patient regain control  - Assess readiness for release of restraint   Outcome: Progressing  Goal: Returns to optimal restraint-free functioning  Description: INTERVENTIONS:  - Assess the patient's behavior and symptoms that indicate continued need for restraint  - Identify and implement measures to help patient regain control  - Assess readiness for release of restraint   Outcome: Progressing     Problem: INFECTION - ADULT  Goal: Absence or prevention of progression during hospitalization  Description: INTERVENTIONS:  - Assess and monitor for signs and symptoms of infection  - Monitor lab/diagnostic results  - Monitor all insertion sites, i.e. indwelling lines, tubes, and drains  - Monitor endotracheal if appropriate and nasal secretions for changes in amount and color  - [place] appropriate cooling/warming therapies per order  - Administer medications as ordered  - Instruct and encourage patient and family to use good hand hygiene technique  - Identify and instruct in appropriate isolation precautions for identified infection/condition  Outcome: Progressing  Goal: Absence of fever/infection during neutropenic period  Description: INTERVENTIONS:  - Monitor WBC    Outcome: Progressing     Problem: Nutrition/Hydration-ADULT  Goal: Nutrient/Hydration intake appropriate for improving, restoring or maintaining nutritional needs  Description: Monitor and assess patient's nutrition/hydration status for malnutrition. Collaborate with interdisciplinary team and initiate plan and interventions as ordered.  Monitor  patient's weight and dietary intake as ordered or per policy. Utilize nutrition screening tool and intervene as necessary. Determine patient's food preferences and provide high-protein, high-caloric foods as appropriate.     INTERVENTIONS:  - Monitor oral intake, urinary output, labs, and treatment plans  - Assess nutrition and hydration status and recommend course of action  - Evaluate amount of meals eaten  - Assist patient with eating if necessary   - Allow adequate time for meals  - Recommend/ encourage appropriate diets, oral nutritional supplements, and vitamin/mineral supplements  - Order, calculate, and assess calorie counts as needed  - Recommend, monitor, and adjust tube feedings and TPN/PPN based on assessed needs  - Assess need for intravenous fluids  - Provide specific nutrition/hydration education as appropriate  - Include patient/family/caregiver in decisions related to nutrition  Outcome: Progressing

## 2024-11-17 NOTE — UTILIZATION REVIEW
Initial Clinical Review    Admission: Date/Time/Statement:   Admission Orders (From admission, onward)       Ordered        11/16/24 0327  INPATIENT ADMISSION  Once                          Orders Placed This Encounter   Procedures    INPATIENT ADMISSION     Standing Status:   Standing     Number of Occurrences:   1     Level of Care:   Critical Care [15]     Estimated length of stay:   More than 2 Midnights     Certification:   I certify that inpatient services are medically necessary for this patient for a duration of greater than two midnights. See H&P and MD Progress Notes for additional information about the patient's course of treatment.     ED Arrival Information       Expected   -    Arrival   11/16/2024 01:18    Acuity   Immediate              Means of arrival   Ambulance    Escorted by   [facility]   Critical Care/ICU    Admission type   Emergency              Arrival complaint   -             Chief Complaint   Patient presents with    Altered Mental Status     Arrived from home via EMS. Per EMS, unknown person within the home activated 911. EMS observed Pt sitting on floor, semi-responsive, SOB, and jaundice. EMS administered Zofran 4 mg and 1g glucagon.       Initial Presentation: 59 y.o. male presents to ED via  EMS  from home after GF noted  confusion  on phone conversation.  Patient found  on floor, minimally responsive.  Also hypoglycemic and   given  glucagon.  Significantly  acidotic  at  6.93, hemoglobin  4.7, lactic acid  25 and ammonia  449.   Abdomen distended, firmm  absent bowel sounds.  Obtunded on initial exam,  hypotensive and  mottled.  BP continued to fall until femoral pulses  no longer  palpable.  Given  Epi push,  code crimson called and  transfusions initiated.   BP improved,  became more alert.  Started on sodium bicarb drip and  sandostatin.  PMH is  alcohol dependence, alcoholic liver disease,  SBP.  Admit  Ip Icu LOC  with  Hemorrhagic shock,  ABLA,  GLORY,  Transaminitis, High  anion gap metabolic acidosis,  Hyperammonemia and thrombocytopenia and plan is  monitor labs,  CIWA scores,  MVI,  aggressive  IVF,  bicarb infusion,  monitor for signs of withdrawal,   GI consult, serial neuro exams, delirium precautions, NPO, transfuse as  needed   and urgent  endoscopy.      Intubated   11/16/sedated  Following initial improvement in hemodynamics and mental status following code crimson, patient had worsening hypotension and became encephalopathic again necessitating intubation for hypoxia and for airway protection. A second code crimson was called and patient received an additional 2 units PRBC, 1 FFP, and 1 cryoprecipitate. Patient had escalating norepinephrine requirements, so vasopressin was also added. Repeat hemoglobin 6.8. 2 more units PRBC given and 2 units of platelets     Gi consult  MELD   29.  Needs  emergent  EGD.   Continue protonix/sandostatin drip.      EGD showed 4 columns of grade 3 esophageal varices with active bleeding s/p banding x 6 and a large amount of blood in the stomach.       Date:  11/17   Day 2:   Remains intubated/sedated.  MELD    35.  Needs repeat  endoscopy  mon  11/18,  too much bleeding  and clot  to accurately  assess.  Continue sandostatin/protonix drip.   On  lactulose enemas,  Hemoglobin stabkle at   10.3.   Weaning  off   vasopressors.    ED Treatment-Medication Administration from 11/16/2024 0118 to 11/16/2024 0512         Date/Time Order Dose Route Action     11/16/2024 0145 lactated ringers bolus 500 mL 500 mL Intravenous New Bag     11/16/2024 0129 glucagon (FOR EMS ONLY) (GLUCAGEN) injection 1 mg 0 mg Does not apply Given to EMS     11/16/2024 0129 ondansetron (FOR EMS ONLY) (ZOFRAN) 4 mg/2 mL injection 4 mg 0 mg Does not apply Given to EMS     11/16/2024 0144 iohexol (OMNIPAQUE) 350 MG/ML injection (MULTI-DOSE) 100 mL 100 mL Intravenous Given     11/16/2024 0145 ceftriaxone (ROCEPHIN) 2 g/50 mL in dextrose IVPB 2,000 mg  Intravenous New Bag     11/16/2024 0254 phytonadione (AQUA-MEPHYTON) 10 mg/mL SC/IM injection 10 mg 10 mg Subcutaneous Given     11/16/2024 0258 prothrombin complex concentrate (human) (Kcentra) 2,000 Units 2,000 Units Intravenous Given     11/16/2024 0257 sodium bicarbonate 150 mEq in dextrose 5 % 1,000 mL infusion 125 mL/hr Intravenous New Bag     11/16/2024 0351 pantoprazole (PROTONIX) 80 mg in sodium chloride 0.9 % 100 mL IVPB 80 mg Intravenous New Bag     11/16/2024 0353 octreotide (SandoSTATIN) 500 mcg in sodium chloride 0.9 % 250 mL infusion 50 mcg/hr Intravenous New Bag            Scheduled Medications:  calcium gluconate, 2 g, Intravenous, Once  cefTRIAXone, 1,000 mg, Intravenous, Q12H  chlorhexidine, 15 mL, Mouth/Throat, Q12H STACI  folic acid 1 mg, thiamine (VITAMIN B1) 100 mg in sodium chloride 0.9 % 100 mL IV piggyback, , Intravenous, Daily  lactulose, 200 g, Rectal, Q6H  metoclopramide, 10 mg, Intravenous, Once  pantoprazole, 40 mg, Intravenous, Q12H STACI      Continuous IV Infusions:  fentaNYL, 100 mcg/hr, Intravenous, Continuous  furosemide, 10 mg/hr, Intravenous, Continuous  norepinephrine, 1-30 mcg/min, Intravenous, Titrated  octreotide, 50 mcg/hr, Intravenous, Continuous      PRN Meds:  influenza vaccine, 0.5 mL, Intramuscular, Prior to discharge  midazolam, 2 mg, Intravenous, Q1H PRN  pneumococcal 20-hiwot conj vacc, 0.5 mL, Intramuscular, Prior to discharge      ED Triage Vitals   Temperature Pulse Respirations Blood Pressure SpO2 Pain Score   11/16/24 0220 11/16/24 0122 11/16/24 0122 11/16/24 0122 11/16/24 0122 11/16/24 0520   (!) 92 °F (33.3 °C) 93 (!) 23 (!) 80/51 99 % No Pain     Weight (last 2 days)       Date/Time Weight    11/17/24 0900 78.9 (174)    11/16/24 0740 79.2 (174.6)    11/16/24 0122 83.4 (183.86)            Vital Signs (last 3 days)       Date/Time Temp Pulse Resp BP MAP (mmHg) Arterial Line BP MAP SpO2 FiO2 (%) O2 Device Patient Position - Orthostatic VS [place] Coma Scale Score  Pain    11/17/24 1435 -- -- -- 89/54 -- -- -- -- -- -- -- -- --    11/17/24 1400 99.3 °F (37.4 °C) 105 16 90/54 69 -- -- 96 % -- Ventilator Lying -- --    11/17/24 1300 99.3 °F (37.4 °C) 105 17 93/52 69 -- -- 94 % -- Ventilator Lying -- --    11/17/24 1220 -- -- -- -- -- -- -- -- -- -- -- -- Med Not Given for Pain - for MAR use only    11/17/24 1200 99.1 °F (37.3 °C) 104 15 114/55 78 -- -- 96 % -- Ventilator Lying 7 --    11/17/24 1103 -- -- -- -- -- -- -- 94 % -- -- -- -- --    11/17/24 1100 99.5 °F (37.5 °C) 104 17 101/55 75 -- -- 93 % -- Ventilator Lying -- --    11/17/24 1045 -- -- -- -- -- -- -- -- -- -- -- -- Med Not Given for Pain - for MAR use only    11/17/24 1000 99.5 °F (37.5 °C) 97 19 128/74 94 91/83 86 mmHg 95 % -- Ventilator Lying -- --    11/17/24 0900 99.5 °F (37.5 °C) 100 17 126/69 92 91/85 88 mmHg 95 % -- Ventilator Lying -- --    11/17/24 0800 99.5 °F (37.5 °C) 100 17 124/68 90 97/78 88 mmHg 95 % -- Ventilator Lying 7 --    11/17/24 0700 99.5 °F (37.5 °C) 101 17 129/69 93 123/68 88 mmHg 95 % -- Ventilator Lying -- --    11/17/24 0618 98.6 °F (37 °C) -- -- -- -- -- -- -- -- -- -- -- Med Not Given for Pain - for MAR use only    11/17/24 0600 99.7 °F (37.6 °C) 97 20 130/73 95 118/64 84 mmHg 96 % 80 Ventilator -- -- --    11/17/24 0400 99.1 °F (37.3 °C) 94 20 129/73 96 122/64 82 mmHg 95 % -- -- -- 8 --    11/17/24 0330 -- -- -- -- -- -- -- 94 % -- -- -- -- --    11/17/24 0300 98.6 °F (37 °C) 99 20 111/62 81 103/49 65 mmHg 94 % -- -- -- -- Med Not Given for Pain - for MAR use only    11/17/24 0252 -- -- -- -- -- 95/44 59 mmHg -- -- -- -- -- --    11/17/24 0203 98.6 °F (37 °C) 95 20 131/72 95 136/58 82 mmHg 94 % 80 Ventilator -- -- --    11/17/24 0100 97.5 °F (36.4 °C) 91 20 133/72 97 133/64 88 mmHg 97 % -- -- -- -- --    11/17/24 0000 98.4 °F (36.9 °C) 90 24 122/72 92 122/60 79 mmHg 96 % -- -- -- 8 --    11/16/24 2331 -- -- -- -- -- -- -- 97 % -- -- -- -- --    11/16/24 2300 98.6 °F (37 °C) 90 24  121/70 90 124/59 78 mmHg 97 % 80 Ventilator -- -- --    11/16/24 2200 98.1 °F (36.7 °C) 87 24 129/71 95 130/64 87 mmHg 97 % 80 Ventilator -- -- --    11/16/24 2100 97.9 °F (36.6 °C) 99 24 101/59 76 92/42 56 mmHg 97 % 80 Ventilator -- -- --    11/16/24 2020 97.9 °F (36.6 °C) 91 24 -- -- 120/53 74 mmHg 94 % -- -- -- -- --    11/16/24 2018 -- -- -- -- -- 127/57 79 mmHg -- -- -- -- -- --    11/16/24 2000 97.9 °F (36.6 °C) 90 24 120/70 90 123/56 78 mmHg -- -- -- -- -- --    11/16/24 1924 97.9 °F (36.6 °C) 90 -- -- -- -- -- -- -- -- -- 8 --    11/16/24 1900 97.9 °F (36.6 °C) 90 24 111/63 81 111/50 68 mmHg 96 % -- -- -- -- --    11/16/24 1800 98.2 °F (36.8 °C) 88 24 109/65 82 114/55 74 mmHg 93 % 80 Ventilator -- -- --    11/16/24 1748 -- -- -- -- -- -- -- -- -- -- -- -- Med Not Given for Pain - for MAR use only    11/16/24 1700 98.2 °F (36.8 °C) 88 24 106/63 79 112/52 71 mmHg 93 % -- -- -- -- --    11/16/24 1600 98.2 °F (36.8 °C) 90 24 110/62 81 119/53 73 mmHg 94 % 80 Ventilator -- 8 --    11/16/24 1500 98.4 °F (36.9 °C) 89 24 113/66 85 115/52 72 mmHg 95 % -- -- -- -- --    11/16/24 1451 -- -- -- -- -- -- -- 96 % -- -- -- -- --    11/16/24 1400 98.4 °F (36.9 °C) 92 24 117/67 87 105/55 70 mmHg 98 % -- -- -- -- --    11/16/24 1300 98.4 °F (36.9 °C) 106 26 122/63 87 118/57 78 mmHg 98 % -- -- -- -- --    11/16/24 1200 97.9 °F (36.6 °C) 103 26 119/56 80 108/60 71 mmHg 87 % -- -- -- 8 --    11/16/24 1100 97.9 °F (36.6 °C) 102 24 114/59 83 102/54 70 mmHg 92 % -- -- -- -- --    11/16/24 1021 -- -- -- -- -- -- -- 90 % -- -- -- -- --    11/16/24 1000 97.5 °F (36.4 °C) 106 24 113/61 82 107/54 72 mmHg 91 % -- -- -- -- --    11/16/24 0951 -- -- -- -- -- -- -- -- -- -- -- -- Med Not Given for Pain - for MAR use only    11/16/24 0949 97.3 °F (36.3 °C) 102 24 111/62 -- 100/51 68 mmHg 90 % -- -- -- -- --    11/16/24 0945 97.3 °F (36.3 °C) 101 24 -- 81 98/51 67 mmHg 91 % -- -- -- -- --    11/16/24 0944 97.3 °F (36.3 °C) 101 24 111/62 --  97/50 66 mmHg 90 % -- -- -- -- --    11/16/24 0930 97.2 °F (36.2 °C) 103 24 107/61 78 92/48 64 mmHg 91 % -- -- -- -- --    11/16/24 0929 97.2 °F (36.2 °C) 102 24 92/47 -- -- -- -- -- -- -- -- --    11/16/24 0915 97 °F (36.1 °C) 102 24 109/59 78 100/50 68 mmHg 91 % -- -- -- -- --    11/16/24 0908 96.8 °F (36 °C) 99 24 93/50 -- 95/49 64 mmHg 90 % -- -- -- -- --    11/16/24 0900 -- -- -- -- -- -- -- -- -- -- -- 7 --    11/16/24 0856 96.6 °F (35.9 °C) 98 24 103/56 -- -- -- -- -- -- -- -- --    11/16/24 0845 96.4 °F (35.8 °C) 103 24 102/50 -- -- -- -- -- -- -- 6 --    11/16/24 0837 96.4 °F (35.8 °C) 101 24 114/55 -- -- -- -- -- -- -- -- --    11/16/24 0830 -- -- -- -- -- -- -- -- -- -- -- 6 --    11/16/24 0824 96.1 °F (35.6 °C) 104 24 110/59 -- 86/45 59 mmHg 92 % -- -- -- -- --    11/16/24 0815 -- -- -- -- -- -- -- -- -- -- -- 6 --    11/16/24 0814 -- -- -- -- -- -- -- 95 % -- -- -- -- --    11/16/24 0809 95.7 °F (35.4 °C) 105 24 124/67 -- 109/53 71 mmHg 94 % -- -- -- -- --    11/16/24 0800 95.5 °F (35.3 °C) 100 24 114/61 81 95/48 64 mmHg 94 % -- -- -- 3 --    11/16/24 0754 95.4 °F (35.2 °C) 98 24 112/59 -- -- -- -- -- -- -- -- --    11/16/24 0740 95 °F (35 °C) 97 24 110/61 -- 94/49 64 mmHg -- -- -- -- -- --    11/16/24 0730 94.8 °F (34.9 °C) 97 24 114/63 -- 95/49 64 mmHg -- -- -- -- -- --    11/16/24 0725 94.6 °F (34.8 °C) 99 24 109/60 79 93/49 64 mmHg -- -- -- -- -- --    11/16/24 0720 94.6 °F (34.8 °C) 97 24 108/57 77 91/47 62 mmHg -- -- -- -- -- --    11/16/24 0718 94.5 °F (34.7 °C) 98 24 -- -- -- -- 94 % -- -- -- -- --    11/16/24 0717 94.5 °F (34.7 °C) 98 24 -- -- -- -- 94 % -- -- -- -- --    11/16/24 0716 94.5 °F (34.7 °C) 96 24 -- -- -- -- 94 % -- -- -- -- --    11/16/24 0715 94.5 °F (34.7 °C) 96 24 113/62 79 95/49 64 mmHg 94 % -- -- -- -- --    11/16/24 0714 94.5 °F (34.7 °C) 96 24 -- -- -- -- 94 % -- -- -- -- --    11/16/24 0712 94.5 °F (34.7 °C) 96 24 95/49 -- -- -- 94 % -- -- -- -- --    11/16/24 0711 94.5 °F  (34.7 °C) 97 24 -- -- -- -- 94 % -- -- -- -- --    11/16/24 0710 94.5 °F (34.7 °C) 97 24 -- 82 -- -- 94 % -- -- -- -- --    11/16/24 0709 94.3 °F (34.6 °C) 99 24 -- -- -- -- 94 % -- -- -- -- --    11/16/24 0708 94.3 °F (34.6 °C) 97 24 -- -- -- -- 95 % -- -- -- -- --    11/16/24 0707 94.3 °F (34.6 °C) 97 24 -- -- -- -- 95 % -- -- -- -- --    11/16/24 0706 94.3 °F (34.6 °C) 97 24 -- -- -- -- 95 % -- -- -- -- --    11/16/24 0705 94.3 °F (34.6 °C) 98 24 112/62 80 -- -- 95 % -- -- -- -- --    11/16/24 0704 94.3 °F (34.6 °C) 100 24 -- -- -- -- 95 % -- -- -- -- --    11/16/24 0703 94.3 °F (34.6 °C) 101 -- -- -- -- -- 95 % -- -- -- -- --    11/16/24 0702 94.3 °F (34.6 °C) 100 -- -- -- -- -- 95 % -- -- -- -- --    11/16/24 0701 94.3 °F (34.6 °C) 99 -- -- -- -- -- 95 % -- -- -- -- --    11/16/24 0700 94.3 °F (34.6 °C) 101 -- 137/65 80 99/56 72 mmHg 94 % -- -- -- -- --    11/16/24 0659 94.3 °F (34.6 °C) 100 -- -- -- -- -- 95 % -- -- -- -- --    11/16/24 0658 -- -- -- -- -- -- -- 95 % -- -- -- -- --    11/16/24 0657 94.3 °F (34.6 °C) 103 -- -- -- -- -- 95 % -- -- -- -- --    11/16/24 0656 94.3 °F (34.6 °C) 105 -- -- -- -- -- 95 % -- -- -- -- Med Not Given for Pain - for MAR use only    11/16/24 0655 94.3 °F (34.6 °C) 108 -- -- 94 -- -- 95 % -- -- -- -- --    11/16/24 0654 94.3 °F (34.6 °C) 108 -- -- -- -- -- 95 % -- -- -- -- --    11/16/24 0648 94.1 °F (34.5 °C) 101 30 141/69 -- -- -- 95 % -- -- -- -- --    11/16/24 0645 94.1 °F (34.5 °C) 101 32 149/70 -- 140/68 92 mmHg -- -- -- -- -- --    11/16/24 0642 94.1 °F (34.5 °C) 101 30 147/68 -- -- -- -- -- -- -- -- --    11/16/24 0623 94.1 °F (34.5 °C) 90 24 97/51 -- 66/39 50 mmHg -- -- -- -- -- --    11/16/24 0621 94.1 °F (34.5 °C) 90 24 65/39 -- -- -- -- -- -- -- -- --    11/16/24 0605 94.1 °F (34.5 °C) 94 24 115/55 -- 86/48 62 mmHg -- -- -- -- -- --    11/16/24 0602 -- 92 -- 107/53 72 -- -- -- -- -- -- -- --    11/16/24 0600 94.1 °F (34.5 °C) 98 24 138/65 94 125/61 84 mmHg 97 % --  -- -- -- --    11/16/24 0556 94.1 °F (34.5 °C) -- -- -- -- -- -- 98 % -- -- -- -- --    11/16/24 0553 94.1 °F (34.5 °C) 88 24 92/48 -- -- -- -- -- -- -- -- --    11/16/24 0530 94.5 °F (34.7 °C) 95 23 113/55 -- 99/56 72 mmHg -- -- -- -- -- --    11/16/24 0525 94.5 °F (34.7 °C) 93 21 100/55 -- 90/45 60 mmHg -- -- -- -- -- --    11/16/24 0520 94.5 °F (34.7 °C) 92 21 93/50 -- 67/41 51 mmHg -- -- -- -- 11 No Pain    11/16/24 0515 94.5 °F (34.7 °C) 92 23 71/40 -- -- -- -- -- -- -- -- --    11/16/24 0445 -- 88 24 97/54 -- -- -- 98 % -- None (Room air) -- -- --    11/16/24 0433 93.7 °F (34.3 °C) 96 24 97/54 -- -- -- -- -- -- -- -- --    11/16/24 0415 93.2 °F (34 °C) 93 24 108/52 75 -- -- 100 % -- None (Room air) Lying -- --    11/16/24 0400 93 °F (33.9 °C) 91 24 112/56 80 -- -- 100 % -- None (Room air) Lying -- --    11/16/24 0358 93 °F (33.9 °C) 93 24 105/50 -- 74/50 61 mmHg 100 % -- None (Room air) Lying -- --    11/16/24 0340 92.7 °F (33.7 °C) 93 24 110/55 -- 87/55 69 mmHg 100 % -- Nasal cannula Lying -- --    11/16/24 0330 92.5 °F (33.6 °C) 93 24 112/55 -- 96/58 73 mmHg 100 % -- Nasal cannula Lying -- --    11/16/24 0320 92.5 °F (33.6 °C) 93 26 118/56 -- 101/59 75 mmHg 100 % -- Nasal cannula Lying -- --    11/16/24 0315 92.5 °F (33.6 °C) 96 24 124/64 -- 116/72 88 mmHg 100 % -- Nasal cannula Lying -- --    11/16/24 0310 92.5 °F (33.6 °C) 94 26 125/66 -- 110/60 80 mmHg 100 % -- Nasal cannula Lying -- --    11/16/24 0300 92.5 °F (33.6 °C) 93 24 125/65 -- 123/64 88 mmHg 100 % -- Nasal cannula Lying -- --    11/16/24 0255 92.5 °F (33.6 °C) 96 24 144/68 -- 130/66 91 mmHg 100 % -- Nasal cannula Lying -- --    11/16/24 0240 -- 97 19 -- -- 167/83 116 mmHg 100 % -- Nasal cannula Lying -- --    11/16/24 0235 -- 96 19 166/81 -- 178/87 123 mmHg 100 % -- Nasal cannula Lying -- --    11/16/24 0230 -- 93 21 182/85 -- -- -- 99 % -- Nasal cannula Lying -- --    11/16/24 0225 -- 89 22 186/90 -- -- -- 99 % -- Nasal cannula Lying -- --     11/16/24 0220 92 °F (33.3 °C) 94 24 178/106 -- -- -- 97 % -- Nasal cannula Lying -- --    11/16/24 0215 -- 104 24 181/101 -- -- -- 97 % -- Nasal cannula Lying -- --    11/16/24 0209 -- 89 22 87/50 -- -- -- 95 % -- Nasal cannula -- -- --    11/16/24 0200 -- 88 22 74/38 -- -- -- 92 % -- Nasal cannula Lying -- --    11/16/24 0130 -- -- -- -- -- -- -- -- -- Nasal cannula Lying 11 --    11/16/24 0122 -- 93 23 80/51 -- -- -- 99 % -- Nasal cannula Lying -- --              Pertinent Labs/Diagnostic Test Results:   Radiology:  US right upper quadrant with liver dopplers   Final Interpretation by Juan Ramon Solomon MD (11/17 5406)      Hepatic steatotic and cirrhotic changes with reversed vascular flow in the main portal vein and right portal vein and with recanalization of umbilical vein consistent with portal hypertension.      Markedly limited evaluation for hepatic mass.      Workstation performed: [id number]         XR chest portable ICU   Final Interpretation by Cynthia Nichols MD (11/16 1819)      Lines and tubes as above with no pneumothorax.      Developing patchy bilateral pneumonia.            Workstation performed: [id number]         XR chest portable ICU   Final Interpretation by Cynthia Nichols MD (11/16 1207)      ET tube 5 cm above the raza.      Mild pulmonary venous congestion. Pneumonia not excluded in the appropriate clinical setting.            Workstation performed: [id number]         XR chest 1 view portable   Final Interpretation by Devan Reece MD (11/16 1202)      No acute cardiopulmonary disease.            Workstation performed: [id number]         TRAUMA - CT head wo contrast   Final Interpretation by Juan Ramon Mosqueda MD (11/16 0224)      No acute intracranial hemorrhage or depressed calvarial fracture.                  Workstation performed: [id number]         TRAUMA - CT spine cervical wo contrast   Final Interpretation by Juan Ramon Mosqueda MD (11/16 0229)      Multilevel degenerative  changes without acute cervical spine fracture or traumatic malalignment.                  Workstation performed: [id number]         TRAUMA - CT chest abdomen pelvis w contrast   Final Interpretation by Juan Ramon Mosqueda MD (11/16 7543)      1.  Exam limited by motion artifact. Cannot exclude subtle nondisplaced fractures given motion. Otherwise no acute displaced fracture or subluxation seen.   2.  Nonspecific subpleural 4 mm nodule in the right lung apex. 1.5 cm nonspecific nodular density in the left lower lobe. Additional subcentimeter subpleural nodular densities in the left lower lobe. Extensive dependent atelectasis and/or early airspace    disease in the bilateral lung bases.   3.  Esophagus is distended with air and fluid. Paraesophageal varices along the mid to distal esophagus noted likely sequelae of portal hypertension.   4.  Sequelae of hepatic cirrhosis, steatosis, and portal hypertension.   5.  Markedly dilated recanalized umbilical vein extending along the ventral abdominal wall with multiple dilated superficial abdominal varices are seen, increased in size compared to the prior study. Findings likely related to sequelae of portal    hypertension.   6.  No evidence of bowel obstruction or free air. Moderate to large amount of abdominal/pelvic ascites.   7.  Infrarenal abdominal aortic aneurysm measuring 3.5 x 3.3 cm noted. No evidence for aortic dissection. Recommend follow-up imaging every 3 years per current guidelines.   8.  No evidence of intrathoracic or intra-abdominal/pelvic visceral injury or vascular injury.   9.  Mild diffuse wall thickening of multiple abdominal/pelvic small bowel loops and diffuse wall thickening of the colon noted which could suggest a diffuse infectious/inflammatory enterocolitis versus hepatic enterocolopathy.      Workstation performed: [id number]           Cardiology:  Echo complete w/ contrast if indicated   Final Result by Jessica Santos MD (11/17 1988)        Left  Ventricle: Left ventricular cavity size is normal. Wall thickness    is normal. The left ventricular ejection fraction is 60%. Systolic    function is normal. Wall motion is normal. Diastolic function is mildly    abnormal, consistent with grade I (abnormal) relaxation.           GI:  EGD   Final Result by Indio Manrique III, MD (11/16 0716)   4 columns of grade 3 esophageal varices with active variceal hemorrhage    status post banding x 6 with hemostasis achieved   A large amount of blood in the stomach prevented adequate examination      RECOMMENDATION:   Continue octreotide drip   Continue Protonix drip   Antibiotics for primary prophylaxis of SBP   No NGT   He will require another endoscopy on Monday              Indio Manrique III, MD       Paracentesis   Final Result by Chuyita Galvan MD (11/16 0958)              Results from last 7 days   Lab Units 11/17/24  0509 11/17/24  0013 11/16/24 1808 11/16/24  1219 11/16/24  0917 11/16/24  0538 11/16/24  0129   WBC Thousand/uL 15.72* 17.09* 18.53* 21.11* 22.06*   < > 22.26*   HEMOGLOBIN g/dL 10.3* 10.3* 9.9* 9.9* 10.3*   < > 4.7*   HEMATOCRIT % 31.4* 31.5* 30.5* 31.7* 34.0*   < > 19.5*   PLATELETS Thousands/uL 66* 67* 73* 92* 78*   < > 102*   BANDS PCT %  --   --   --   --   --   --  20*    < > = values in this interval not displayed.         Results from last 7 days   Lab Units 11/17/24  0509 11/17/24  0013 11/16/24  1808 11/16/24  1219 11/16/24  1007 11/16/24  0917 11/16/24  0538 11/16/24  0129   SODIUM mmol/L 142 141 140 141  --  139 136 139   POTASSIUM mmol/L 3.6 3.8 4.0 4.7  --  5.6* 5.8* 4.9   CHLORIDE mmol/L 101 100 98 99  --  98 96 96   CO2 mmol/L 32 34* 29 25  --  18* 10* 11*   ANION GAP mmol/L 9 7 13 17*  --  23* 30* 32*   BUN mg/dL 37* 34* 31* 29*  --  28* 30* 29*   CREATININE mg/dL 2.77* 2.54* 2.25* 1.90*  --  1.74* 1.79* 1.77*   EGFR ml/min/1.73sq m 23 26 30 37  --  41 40 40   CALCIUM mg/dL 9.3 9.0 8.4 8.5  --  8.6 7.3* 8.0*    CALCIUM, IONIZED mmol/L 1.13 1.08* 0.99* 0.98* 0.92*  --   --   --    MAGNESIUM mg/dL 1.5*  --   --   --   --   --  1.6* 1.7*   PHOSPHORUS mg/dL 2.4  --   --   --   --   --  8.7*  --      Results from last 7 days   Lab Units 11/17/24  0509 11/17/24  0013 11/16/24  1808 11/16/24  1219 11/16/24  0917 11/16/24  0538 11/16/24  0129   AST U/L 6,014* 7,470* 7,596* 6,213* 5,123* 4,388* 4,335*   ALT U/L 1,049* 1,171* 1,127* 1,027* 937* 904* 933*   ALK PHOS U/L 268* 273* 254* 240* 242* 255* 304*   TOTAL PROTEIN g/dL 5.4* 5.2* 5.2* 5.5* 5.4* 4.3* 4.8*   ALBUMIN g/dL 3.4* 3.3* 3.2* 3.6 3.2* 2.3* 2.7*   TOTAL BILIRUBIN mg/dL 6.47* 6.37* 5.89* 5.35* 4.84* 4.13* 4.53*   BILIRUBIN DIRECT mg/dL  --   --   --   --   --   --  2.78*   AMMONIA umol/L 252*  --   --   --   --  487* 449*     Results from last 7 days   Lab Units 11/16/24  0134 11/16/24  0121   POC GLUCOSE mg/dl 144* 72     Results from last 7 days   Lab Units 11/17/24  0509 11/17/24  0013 11/16/24  1808 11/16/24  1219 11/16/24  0917 11/16/24  0538 11/16/24  0129   GLUCOSE RANDOM mg/dL 136 152* 182* 155* 147* 124 73      Results from last 7 days   Lab Units 11/17/24  0510 11/17/24  0020 11/16/24  1810   PH ART  7.501* 7.499* 7.453*   PCO2 ART mm Hg 34.6* 34.4* 30.0*   PO2 ART mm Hg 93.5 107.6 77.2   HCO3 ART mmol/L 26.4 26.2 20.5*   BASE EXC ART mmol/L 3.4 3.1 -2.6   O2 CONTENT ART mL/dL 14.5* 15.4* 14.4*   O2 HGB, ARTERIAL % 96.2 96.6 93.7*   ABG SOURCE  Line, Arterial Line, Arterial Line, Arterial     Results from last 7 days   Lab Units 11/16/24  1322 11/16/24  0129   PH NIESHA  7.310 6.939*   PCO2 NIESHA mm Hg 35.2* 16.9*   PO2 NIESHA mm Hg 52.0* 169.1*   HCO3 NIESHA mmol/L 17.3* 3.5*   BASE EXC NIESHA mmol/L -8.1 -26.2   O2 CONTENT NIESHA ml/dL 12.6 7.3   O2 HGB, VENOUS % 84.2* 89.8*         Results from last 7 days   Lab Units 11/16/24  0538   CK TOTAL U/L 829*     Results from last 7 days   Lab Units 11/16/24  0538 11/16/24  0406 11/16/24  0129   HS TNI 0HR ng/L  --   --  39   HS  TNI 2HR ng/L  --  60*  --    HSTNI D2 ng/L  --  21*  --    HS TNI 4HR ng/L 85*  --   --    HSTNI D4 ng/L 46*  --   --          Results from last 7 days   Lab Units 11/17/24  0509 11/16/24  1219 11/16/24  0538 11/16/24  0129   PROTIME seconds 28.6* 22.8* 28.9* 37.1*   INR  2.61* 1.94* 2.65* 3.69*   PTT seconds  --   --  42* 40*     Results from last 7 days   Lab Units 11/16/24  0129   TSH 3RD GENERATON uIU/mL 1.036     Results from last 7 days   Lab Units 11/16/24  0406   PROCALCITONIN ng/ml 0.39*     Results from last 7 days   Lab Units 11/17/24  0509 11/17/24  0013 11/16/24  1808 11/16/24  1219 11/16/24  0917 11/16/24  0537 11/16/24  0129   LACTIC ACID mmol/L 3.0* 4.2* 9.1* 13.9* 16.8* 23.4* 25.2*             Results from last 7 days   Lab Units 11/16/24  0129   BNP pg/mL 263*             Results from last 7 days   Lab Units 11/17/24  0410 11/16/24  0727 11/16/24  0647 11/16/24  0634 11/16/24  0634 11/16/24  0632 11/16/24  0632 11/16/24  0621 11/16/24  0621 11/16/24  0619 11/16/24  0619 11/16/24  0432 11/16/24  0234 11/16/24  0209 11/16/24  0158   UNIT PRODUCT CODE  Y1404P64  G4825T39  O8648L32  G0998E18  F3916A33  W3531W34  Y3962E73  N7828R79 P3222D71  F9961L39 N4686F91 I0355K97 A7452E28  O1890T96 G8647F52 B7475S84  I0763M96 C3506N83 W4979J18 Z7278J17 I2468P63 J1976C18 R3039B61   < > M3698Y72  J1566D98   UNIT NUMBER  [id number]  [id number]  [id number]  [id number]  [id number]  [id number]  [id number]  [id number]  [id number]  [id number] [id number]  [id number]   < > [id number]  [id number]   UNITABO  O  O  A  A  O  O  O  O O  O O O O  O O O  O AB AB O O AB AB   < > O  O   UNITRH  POS  POS  POS  POS  POS  POS  POS  POS POS  POS POS POS  POS  POS POS POS  POS POS POS POS POS POS POS   < > POS  POS   CROSSMATCH  Compatible  Compatible  Compatible  Compatible  Compatible  Compatible Compatible  Compatible  --  Compatible Compatible  Compatible Compatible Compatible  Compatible  --   --   --   --   --   --   --  Compatible  Compatible   UNIT DISPENSE STATUS  Crossmatched  Presumed Trans  Presumed Trans  Presumed Trans  Presumed Trans  Crossmatched  Presumed Trans  Crossmatched Presumed Trans  Presumed Trans Presumed Trans Presumed Trans Crossmatched  Return to Inv Presumed Trans Return to Inv  Crossmatched Presumed Trans Return to Inv Presumed Trans Return to Inv Crossmatched Return to Inv   < > Presumed Trans  Presumed Trans   UNIT PRODUCT VOL ml 350  350  275  265  350  350  350  350 350  350 311 350 350  350 350 350  350 204 204 125 125 280 204   < > 350  350    < > = values in this interval not displayed.                         Results from last 7 days   Lab Units 11/16/24  0527   CLARITY UA  Slightly Cloudy   COLOR UA  Yellow   SPEC GRAV UA  1.020   PH UA  5.5   GLUCOSE UA mg/dl Negative   KETONES UA mg/dl Trace*   BLOOD UA  Large*   PROTEIN UA mg/dl 30 (1+)*   NITRITE UA  Negative   BILIRUBIN UA  Negative   UROBILINOGEN UA E.U./dl 1.0   LEUKOCYTES UA  Negative   WBC UA /hpf 2-4   RBC UA /hpf 4-10*   BACTERIA UA /hpf Occasional   EPITHELIAL CELLS WET PREP /hpf Occasional   MUCUS THREADS  Occasional*             Results from last 7 days   Lab Units 11/16/24  0523   AMPH/METH  Negative   BARBITURATE UR  Negative   BENZODIAZEPINE UR  Negative   COCAINE UR  Negative   METHADONE URINE  Negative   OPIATE UR  Negative   PCP UR  Negative   THC UR  Negative     Results from last 7 days   Lab Units 11/16/24  0406 11/16/24  0129   ETHANOL LVL mg/dL <10  --    ACETAMINOPHEN LVL ug/mL  --  <2*   SALICYLATE LVL mg/dL  --  <5                 Results from last 7 days   Lab Units 11/16/24  0158 11/16/24  0129   BLOOD CULTURE  No  Growth at 24 hrs. No Growth at 24 hrs.               Present on Admission:   ETOH abuse   Alcoholic cirrhosis (HCC)   Hemorrhagic shock (HCC)   (Resolved) High anion gap metabolic acidosis   GLORY (acute kidney injury) (HCC)   Transaminitis   Thrombocytopenia (HCC)   Hyperammonemia (HCC)   ABLA (acute blood loss anemia)   Encephalopathy   GI bleed   Hypoglycemia   Coagulopathy (HCC)   Acute respiratory failure (HCC)      Admitting Diagnosis: Hepatic encephalopathy (HCC) [K76.82]  Portal hypertension (HCC) [K76.6]  Hemorrhagic shock (HCC) [R57.8]  Lactic acidosis [E87.20]  Hyperbilirubinemia [E80.6]  Coagulopathy (HCC) [D68.9]  Leukocytosis [D72.829]  Altered mental status [R41.82]  Metabolic acidosis [E87.20]  Hypoglycemia [E16.2]  Thrombocytopenia (HCC) [D69.6]  Upper GI bleed [K92.2]  Transaminitis [R74.01]  Bandemia [D72.825]  GLORY (acute kidney injury) (HCC) [N17.9]  Alcoholic cirrhosis of liver with ascites (HCC) [K70.31]  Acute encephalopathy [G93.40]  Hypothermia, initial encounter [T68.XXXA]  Age/Sex: 59 y.o. male    Network Utilization Review Department  ATTENTION: Please call with any questions or concerns to [phone] and carefully listen to the prompts so that you are directed to the right person. All voicemails are confidential.   For Discharge needs, contact Care Management DC Support Team at [phone] opt. [phone]  Send all requests for admission clinical reviews, approved or denied determinations and any other requests to dedicated fax number below belonging to the campus where the patient is receiving treatment. List of dedicated fax numbers for the Facilities:  FACILITY NAME UR FAX NUMBER   ADMISSION DENIALS (Administrative/Medical Necessity) [phone]   DISCHARGE SUPPORT TEAM (NETWORK) [phone]   PARENT CHILD HEALTH (Maternity/NICU/Pediatrics) [phone]   [facility] [phone]   [facility] [phone]   [facility]  [facility] [phone]   [facility] [phone]   [facility] [phone]   [facility] [phone]   [facility] [phone]   [facility] [phone]   [facility] [phone]   [facility] [phone]   [facility] [phone]   [facility] [phone]

## 2024-11-17 NOTE — ASSESSMENT & PLAN NOTE
Continue thiamine, folic acid, multivitamin intravenously daily.  Monitor closely for signs of withdrawal  CIWA protocol when appropriate

## 2024-11-17 NOTE — PLAN OF CARE
Problem: SAFETY,RESTRAINT: NV/NON-SELF DESTRUCTIVE BEHAVIOR  Goal: Remains free of harm/injury (restraint for non violent/non self-detsructive behavior)  Description: INTERVENTIONS:  - Instruct patient/family regarding restraint use   - Assess and monitor physiologic and psychological status   - Provide interventions and comfort measures to meet assessed patient needs   - Identify and implement measures to help patient regain control  - Assess readiness for release of restraint   Outcome: Progressing  Goal: Returns to optimal restraint-free functioning  Description: INTERVENTIONS:  - Assess the patient's behavior and symptoms that indicate continued need for restraint  - Identify and implement measures to help patient regain control  - Assess readiness for release of restraint   Outcome: Progressing     Problem: PAIN - ADULT  Goal: Verbalizes/displays adequate comfort level or baseline comfort level  Description: Interventions:  - Encourage patient to monitor pain and request assistance  - Assess pain using appropriate pain scale  - Administer analgesics based on type and severity of pain and evaluate response  - Implement non-pharmacological measures as appropriate and evaluate response  - Consider cultural and social influences on pain and pain management  - Notify physician/advanced practitioner if interventions unsuccessful or patient reports new pain  Outcome: Progressing     Problem: INFECTION - ADULT  Goal: Absence or prevention of progression during hospitalization  Description: INTERVENTIONS:  - Assess and monitor for signs and symptoms of infection  - Monitor lab/diagnostic results  - Monitor all insertion sites, i.e. indwelling lines, tubes, and drains  - Monitor endotracheal if appropriate and nasal secretions for changes in amount and color  - Capac appropriate cooling/warming therapies per order  - Administer medications as ordered  - Instruct and encourage patient and family to use good hand  hygiene technique  - Identify and instruct in appropriate isolation precautions for identified infection/condition  Outcome: Progressing  Goal: Absence of fever/infection during neutropenic period  Description: INTERVENTIONS:  - Monitor WBC    Outcome: Progressing     Problem: SAFETY ADULT  Goal: Patient will remain free of falls  Description: INTERVENTIONS:  - Educate patient/family on patient safety including physical limitations  - Instruct patient to call for assistance with activity   - Consult OT/PT to assist with strengthening/mobility   - Keep Call bell within reach  - Keep bed low and locked with side rails adjusted as appropriate  - Keep care items and personal belongings within reach  - Initiate and maintain comfort rounds  - Make Fall Risk Sign visible to staff  - Initiate/Maintain bed alarm  - Obtain necessary fall risk management equipment  - Apply yellow socks and bracelet for high fall risk patients  - Consider moving patient to room near nurses station  Outcome: Progressing  Goal: Maintain or return to baseline ADL function  Description: INTERVENTIONS:  -  Assess patient's ability to carry out ADLs; assess patient's baseline for ADL function and identify physical deficits which impact ability to perform ADLs (bathing, care of mouth/teeth, toileting, grooming, dressing, etc.)  - Assess/evaluate cause of self-care deficits   - Assess range of motion  - Assess patient's mobility; develop plan if impaired  - Assess patient's need for assistive devices and provide as appropriate  - Encourage maximum independence but intervene and supervise when necessary  - Involve family in performance of ADLs  - Assess for home care needs following discharge   - Consider OT consult to assist with ADL evaluation and planning for discharge  - Provide patient education as appropriate  Outcome: Progressing  Goal: Maintains/Returns to pre admission functional level  Description: INTERVENTIONS:  - Perform AM-PAC 6 Click  Basic Mobility/ Daily Activity assessment daily.  - Set and communicate daily mobility goal to care team and patient/family/caregiver.   - Collaborate with rehabilitation services on mobility goals if consulted  - Perform Range of Motion 3 times a day.  - Reposition patient every 2 hours.  - Record patient progress and toleration of activity level   Outcome: Progressing     Problem: DISCHARGE PLANNING  Goal: Discharge to home or other facility with appropriate resources  Description: INTERVENTIONS:  - Identify barriers to discharge w/patient and caregiver  - Arrange for needed discharge resources and transportation as appropriate  - Identify discharge learning needs (meds, wound care, etc.)  - Arrange for interpretive services to assist at discharge as needed  - Refer to Case Management Department for coordinating discharge planning if the patient needs post-hospital services based on physician/advanced practitioner order or complex needs related to functional status, cognitive ability, or social support system  Outcome: Progressing     Problem: Knowledge Deficit  Goal: Patient/family/caregiver demonstrates understanding of disease process, treatment plan, medications, and discharge instructions  Description: Complete learning assessment and assess knowledge base.  Interventions:  - Provide teaching at level of understanding  - Provide teaching via preferred learning methods  Outcome: Progressing     Problem: Nutrition/Hydration-ADULT  Goal: Nutrient/Hydration intake appropriate for improving, restoring or maintaining nutritional needs  Description: Monitor and assess patient's nutrition/hydration status for malnutrition. Collaborate with interdisciplinary team and initiate plan and interventions as ordered.  Monitor patient's weight and dietary intake as ordered or per policy. Utilize nutrition screening tool and intervene as necessary. Determine patient's food preferences and provide high-protein, high-caloric  foods as appropriate.     INTERVENTIONS:  - Monitor oral intake, urinary output, labs, and treatment plans  - Assess nutrition and hydration status and recommend course of action  - Evaluate amount of meals eaten  - Assist patient with eating if necessary   - Allow adequate time for meals  - Recommend/ encourage appropriate diets, oral nutritional supplements, and vitamin/mineral supplements  - Order, calculate, and assess calorie counts as needed  - Recommend, monitor, and adjust tube feedings and TPN/PPN based on assessed needs  - Assess need for intravenous fluids  - Provide specific nutrition/hydration education as appropriate  - Include patient/family/caregiver in decisions related to nutrition  Outcome: Progressing     Problem: Prexisting or High Potential for Compromised Skin Integrity  Goal: Skin integrity is maintained or improved  Description: INTERVENTIONS:  - Identify patients at risk for skin breakdown  - Assess and monitor skin integrity  - Assess and monitor nutrition and hydration status  - Monitor labs   - Assess for incontinence   - Turn and reposition patient  - Assist with mobility/ambulation  - Relieve pressure over bony prominences  - Avoid friction and shearing  - Provide appropriate hygiene as needed including keeping skin clean and dry  - Evaluate need for skin moisturizer/barrier cream  - Collaborate with interdisciplinary team   - Patient/family teaching  - Consider wound care consult   Outcome: Progressing

## 2024-11-17 NOTE — ASSESSMENT & PLAN NOTE
Secondary to actively bleeding esophageal varices  Gastroenterology following  Continue octreotide and Protonix  Monitor hemoglobins and transfuse as needed

## 2024-11-17 NOTE — ASSESSMENT & PLAN NOTE
On ER presentation, hemoglobin 4.7 with severe hypotension requiring code Crimson to stabilize  Patient required second code Crimson as he decompensated a second time after admission to the ICU.  Emergent EGD revealed esophageal varices with active brisk bleeding, hemostasis achieved.  Patient required multiple blood products including packed red blood cells, FFP, platelets, and cryoprecipitate  Monitoring hemoglobins every 6 hours.  Transfuse as needed

## 2024-11-17 NOTE — ASSESSMENT & PLAN NOTE
Ammonia level 449 on admission, increased to 487  Lactulose enemas in attempt to drop ammonia levels  Lack of gastric conduit for enteral administration

## 2024-11-17 NOTE — RESPIRATORY THERAPY NOTE
RT Ventilator Management Note  Duncan Jackcraft 59 y.o. male MRN: [id number]  Unit/Bed#: ICU 11 Encounter: [id number]      Daily Screen         11/16/2024  0814 11/17/2024  0711          Patient safety screen outcome:: Failed Failed      Not Ready for Weaning due to:: Underline problem not resolved Underline problem not resolved;FiO2 >60%                Physical Exam:   Assessment Type: Assess only  General Appearance: Sedated  Respiratory Pattern: Assisted  Chest Assessment: Chest expansion symmetrical  Bilateral Breath Sounds: Coarse  Suction: ET Tube  O2 Device: VENT  Subjective Data: Sedated      Resp Comments: titrated fio2 to 60%       11/17/24 1458   Respiratory Assessment   Assessment Type Assess only   General Appearance Sedated   Respiratory Pattern Assisted   Chest Assessment Chest expansion symmetrical   Bilateral Breath Sounds Coarse   Suction ET Tube   Resp Comments titrated fio2 to 60%   O2 Device VENT   Subjective Data Sedated   Vent Information   Vent ID [id number]   Vent type Bella C3   Bella Vent Mode APVcmv   $ Pulse Oximetry Spot Check Charge Completed   SpO2 96 %   APVcmv Settings   Resp Rate (BPM) 16 BPM   VT (mL) 450 mL   Insp Time (sec) 0.85 sec   FIO2 (%) (S)  60 %   PEEP (cmH2O) 8 cmH2O   I:E Ratio 1:3.4   Insp Resistance 3   P-ramp (ms) 100 (ms)   Flow Trigger (LPM) 2 LPM   Humidification Heater   Heater Temp 98.6 °F (37 °C)   APVcmv Actuals   Resp Rate (BPM) 17 BPM   VT (mL) 706 mL   MV (Obs) 11.6   MAP (cmH2O) 9.8 cmH2O   Peak Pressure (cmH2O) 15 cmH2O   I:E Ratio (Obs) 1:3.4   Static Compliance (mL/cmH20) 133 mL/cmH2O   Heater Temperature (Obs) 98.8 °F (37.1 °C)   APVcmv ALARMS   High Peak Pressure (cmH2O) 45 cmH2O   Low Peak Pressure (cmH2O) 5 cm H2O   High Resp Rate (BPM) 40 BPM   High MV (L/min) 15 L/min   Low MV (L/min) 4 L/min   High VT (mL) 950 mL   Low VT (mL) 200 mL   Apnea Time (s) 20 S   Maintenance   Alarm (pink) cable attached No   Resuscitation bag with peep valve at  bedside Yes   Water bag changed No   Circuit changed No   ETT  Hi-Lo;Cuffed 8 mm   Placement Date/Time: 11/16/24 (c) 8712   Type: Hi-Lo;Cuffed  Tube Size: 8 mm  Location: Oral  Insertion attempts: 2  Placement Verification: Chest x-ray;End tidal CO2   Secured at (cm) 24   Measured from Teeth   Secured Location Center   Repositioned (S)  Center to Left   Secured by Commercial tube irizarry   Site Condition Dry   Cuff Pressure (color) Green   HI-LO Suction  Continuous low suction   HI-LO Secretions Small;Blood tinged   HI-LO Intervention Patent

## 2024-11-18 PROBLEM — G92.8 TOXIC METABOLIC ENCEPHALOPATHY: Status: ACTIVE | Noted: 2024-01-01

## 2024-11-18 PROBLEM — D72.829 LEUKOCYTOSIS: Status: ACTIVE | Noted: 2024-01-01

## 2024-11-18 PROBLEM — K70.31 ASCITES DUE TO ALCOHOLIC CIRRHOSIS (HCC): Status: ACTIVE | Noted: 2018-12-30

## 2024-11-18 NOTE — ASSESSMENT & PLAN NOTE
Baseline renal function within normal range.  Presented to facility creatinine 1.7 mg/dL and elevated.  Noted progressive decline in renal function along with urine output likely as result of ischemic ATN from hemorrhagic shock.  Urine output is 107 cc in the past 24 hours with serum creatinine peaked up to 4.4 mg/dL and elevated.  Patient had not responded to Bumex gtt. and urine output remains dismal.  Will plan to initiate patient on continuous venovenous hemodiafiltration.

## 2024-11-18 NOTE — ASSESSMENT & PLAN NOTE
Presented with a hemoglobin of 4.7 g/dL and low.  Etiology was variceal bleeding.  Patient received FFP, cryoprecipitate and PRBC with current hemoglobin of 10 g/dL and improved.

## 2024-11-18 NOTE — ASSESSMENT & PLAN NOTE
Ammonia level 449 on admission. Dropped to 186 on 11/18.  Lactulose enemas in attempt to drop ammonia levels  Lack of gastric conduit for enteral administration  If patient is on PO, can start rifaximin

## 2024-11-18 NOTE — PLAN OF CARE
Problem: SAFETY,RESTRAINT: NV/NON-SELF DESTRUCTIVE BEHAVIOR  Goal: Remains free of harm/injury (restraint for non violent/non self-detsructive behavior)  Description: INTERVENTIONS:  - Instruct patient/family regarding restraint use   - Assess and monitor physiologic and psychological status   - Provide interventions and comfort measures to meet assessed patient needs   - Identify and implement measures to help patient regain control  - Assess readiness for release of restraint   Outcome: Progressing  Goal: Returns to optimal restraint-free functioning  Description: INTERVENTIONS:  - Assess the patient's behavior and symptoms that indicate continued need for restraint  - Identify and implement measures to help patient regain control  - Assess readiness for release of restraint   Outcome: Progressing     Problem: PAIN - ADULT  Goal: Verbalizes/displays adequate comfort level or baseline comfort level  Description: Interventions:  - Encourage patient to monitor pain and request assistance  - Assess pain using appropriate pain scale  - Administer analgesics based on type and severity of pain and evaluate response  - Implement non-pharmacological measures as appropriate and evaluate response  - Consider cultural and social influences on pain and pain management  - Notify physician/advanced practitioner if interventions unsuccessful or patient reports new pain  Outcome: Progressing     Problem: INFECTION - ADULT  Goal: Absence or prevention of progression during hospitalization  Description: INTERVENTIONS:  - Assess and monitor for signs and symptoms of infection  - Monitor lab/diagnostic results  - Monitor all insertion sites, i.e. indwelling lines, tubes, and drains  - Monitor endotracheal if appropriate and nasal secretions for changes in amount and color  - [place] appropriate cooling/warming therapies per order  - Administer medications as ordered  - Instruct and encourage patient and family to use good hand  hygiene technique  - Identify and instruct in appropriate isolation precautions for identified infection/condition  Outcome: Progressing  Goal: Absence of fever/infection during neutropenic period  Description: INTERVENTIONS:  - Monitor WBC    Outcome: Progressing     Problem: SAFETY ADULT  Goal: Patient will remain free of falls  Description: INTERVENTIONS:  - Educate patient/family on patient safety including physical limitations  - Instruct patient to call for assistance with activity   - Consult OT/PT to assist with strengthening/mobility   - Keep Call bell within reach  - Keep bed low and locked with side rails adjusted as appropriate  - Keep care items and personal belongings within reach  - Initiate and maintain comfort rounds  - Make Fall Risk Sign visible to staff  - Offer Toileting every 2 Hours, in advance of need  - Initiate/Maintain bed alarm  - Obtain necessary fall risk management equipment:   - Apply yellow socks and bracelet for high fall risk patients  - Consider moving patient to room near nurses station  Outcome: Progressing  Goal: Maintain or return to baseline ADL function  Description: INTERVENTIONS:  -  Assess patient's ability to carry out ADLs; assess patient's baseline for ADL function and identify physical deficits which impact ability to perform ADLs (bathing, care of mouth/teeth, toileting, grooming, dressing, etc.)  - Assess/evaluate cause of self-care deficits   - Assess range of motion  - Assess patient's mobility; develop plan if impaired  - Assess patient's need for assistive devices and provide as appropriate  - Encourage maximum independence but intervene and supervise when necessary  - Involve family in performance of ADLs  - Assess for home care needs following discharge   - Consider OT consult to assist with ADL evaluation and planning for discharge  - Provide patient education as appropriate  Outcome: Progressing  Goal: Maintains/Returns to pre admission functional  level  Description: INTERVENTIONS:  - Perform AM-PAC 6 Click Basic Mobility/ Daily Activity assessment daily.  - Set and communicate daily mobility goal to care team and patient/family/caregiver.   - Collaborate with rehabilitation services on mobility goals if consulted  - Reposition patient every 2 hours.  - Out of bed for toileting  - Record patient progress and toleration of activity level   Outcome: Progressing     Problem: DISCHARGE PLANNING  Goal: Discharge to home or other facility with appropriate resources  Description: INTERVENTIONS:  - Identify barriers to discharge w/patient and caregiver  - Arrange for needed discharge resources and transportation as appropriate  - Identify discharge learning needs (meds, wound care, etc.)  - Arrange for interpretive services to assist at discharge as needed  - Refer to Case Management Department for coordinating discharge planning if the patient needs post-hospital services based on physician/advanced practitioner order or complex needs related to functional status, cognitive ability, or social support system  Outcome: Progressing     Problem: Knowledge Deficit  Goal: Patient/family/caregiver demonstrates understanding of disease process, treatment plan, medications, and discharge instructions  Description: Complete learning assessment and assess knowledge base.  Interventions:  - Provide teaching at level of understanding  - Provide teaching via preferred learning methods  Outcome: Progressing     Problem: Nutrition/Hydration-ADULT  Goal: Nutrient/Hydration intake appropriate for improving, restoring or maintaining nutritional needs  Description: Monitor and assess patient's nutrition/hydration status for malnutrition. Collaborate with interdisciplinary team and initiate plan and interventions as ordered.  Monitor patient's weight and dietary intake as ordered or per policy. Utilize nutrition screening tool and intervene as necessary. Determine patient's food  preferences and provide high-protein, high-caloric foods as appropriate.     INTERVENTIONS:  - Monitor oral intake, urinary output, labs, and treatment plans  - Assess nutrition and hydration status and recommend course of action  - Evaluate amount of meals eaten  - Assist patient with eating if necessary   - Allow adequate time for meals  - Recommend/ encourage appropriate diets, oral nutritional supplements, and vitamin/mineral supplements  - Order, calculate, and assess calorie counts as needed  - Recommend, monitor, and adjust tube feedings and TPN/PPN based on assessed needs  - Assess need for intravenous fluids  - Provide specific nutrition/hydration education as appropriate  - Include patient/family/caregiver in decisions related to nutrition  Outcome: Progressing     Problem: Prexisting or High Potential for Compromised Skin Integrity  Goal: Skin integrity is maintained or improved  Description: INTERVENTIONS:  - Identify patients at risk for skin breakdown  - Assess and monitor skin integrity  - Assess and monitor nutrition and hydration status  - Monitor labs   - Assess for incontinence   - Turn and reposition patient  - Assist with mobility/ambulation  - Relieve pressure over bony prominences  - Avoid friction and shearing  - Provide appropriate hygiene as needed including keeping skin clean and dry  - Evaluate need for skin moisturizer/barrier cream  - Collaborate with interdisciplinary team   - Patient/family teaching  - Consider wound care consult   Outcome: Progressing

## 2024-11-18 NOTE — ASSESSMENT & PLAN NOTE
Hypotension with Hgb 4.7 on presentation with distended abd and altered mental status.  Code crimson initiated by emergency room, and again in the ICU.   PRBC-6, FFP-3, platelets -2 packs, cryoprecipitate.  Vitamin K 10 mg IV, PCC administered.   EGD revealed source to be briskly bleeding esophageal varices, hemostasis achieved.  S/p esophageal varices banding on 11/16  Continue to monitor hemoglobins every 6 hours and transfuse as needed.  Further treatment for his alcoholic cirrhosis per gastroenterology  Continue octreotide and Protonix.

## 2024-11-18 NOTE — ANESTHESIA PREPROCEDURE EVALUATION
Procedure:  EGD  EGD 11/16 with multiple grade III varices, + active bleeding with blood in stomach    Plan for repeat EGD for full examination + reassessment of varices    Relevant Problems   ANESTHESIA (within normal limits)      CARDIO   (+) PE (pulmonary thromboembolism) (HCC)      GI/HEPATIC   (+) Alcoholic cirrhosis (HCC)   (+) GI bleed      /RENAL   (+) GLORY (acute kidney injury) (HCC) (CVVH to start today after EGD, K+ 3.6)      HEMATOLOGY   (+) ABLA (acute blood loss anemia)   (+) Coagulopathy (HCC) (INR 3.34--> FFP x2u given)   (+) Thrombocytopenia (HCC)      PULMONARY   (+) Acute respiratory failure (HCC)      No Known Allergies    Social History     Tobacco Use    Smoking status: Every Day     Current packs/day: 1.00     Average packs/day: 1 pack/day for 25.0 years (25.0 ttl pk-yrs)     Types: Cigarettes    Smokeless tobacco: Never   Substance Use Topics    Alcohol use: Yes     Comment: vodka -approx one bottle every nite.     Drug use: No     Current Outpatient Medications   Medication Instructions    amoxicillin (AMOXIL) 875 mg tablet START ONE DAY PRIOR TO PROCEDURE. TAKE ONE TAB BY MOUTH EVERY 12 HOURS UNTIL FINISHED    cephalexin (KEFLEX) 500 mg capsule 1 capsule, 2 times daily    oxyCODONE-acetaminophen (PERCOCET) 5-325 mg per tablet 1 tablet, Every 6 hours PRN     Lab Results   Component Value Date    WBC 19.14 (H) 11/18/2024    HGB 10.0 (L) 11/18/2024    HCT 31.9 (L) 11/18/2024    PLT 55 (L) 11/18/2024    SODIUM 145 11/18/2024    K 3.6 11/18/2024     11/18/2024    CO2 22 11/18/2024    BUN 58 (H) 11/18/2024    CREATININE 5.01 (H) 11/18/2024    GLUC 33 (LL) 11/18/2024    HGBA1C 5.4 06/25/2017    AST 2,410 (H) 11/18/2024     (H) 11/18/2024    ALKPHOS 222 (H) 11/18/2024    TBILI 7.85 (H) 11/18/2024    ALB 3.0 (L) 11/18/2024    PROTIME 33.6 (H) 11/18/2024    PTT 42 (H) 11/16/2024    INR 3.24 (H) 11/18/2024     MELD 3.0: 38 at 11/18/2024 12:14 PM  MELD-Na: 41 at 11/18/2024 12:14  PM  Calculated from:  Serum Creatinine: 5.01 mg/dL (Using max of 3 mg/dL) at 11/18/2024 12:14 PM  Serum Sodium: 145 mmol/L (Using max of 137 mmol/L) at 11/18/2024 12:14 PM  Total Bilirubin: 7.85 mg/dL at 11/18/2024  5:00 AM  Serum Albumin: 3 g/dL at 11/18/2024  5:00 AM  INR(ratio): 3.24 at 11/18/2024  5:00 AM  Age at listing (hypothetical): 59 years  Sex: Male at 11/18/2024 12:14 PM    Vitals:    11/18/24 1320   BP: 91/51   Pulse: (!) 109   Resp: (!) 30   Temp: 99.7 °F (37.6 °C)   SpO2: 94%     Echo 11/17/24   Left Ventricle: Left ventricular cavity size is normal. Wall thickness is normal. The left ventricular ejection fraction is 60%. Systolic function is normal. Wall motion is normal. Diastolic function is mildly abnormal, consistent with grade I (abnormal) relaxation.     Physical Exam    Airway    Mallampati score: already intubated         Dental       Cardiovascular  Rhythm: irregular    Pulmonary   Decreased breath sounds, No wheezes    Other Findings        Anesthesia Plan  ASA Score- 4     Anesthesia Type- general with ASA Monitors.         Additional Monitors:     Airway Plan:     Comment: Intubated in ICU, no sedation  R IJ CVC.       Plan Factors-    Chart reviewed. EKG reviewed.  Existing labs reviewed. Patient summary reviewed.                  Induction-     Postoperative Plan-     Perioperative Resuscitation Plan - Level 1 - Full Code.       Informed Consent- Anesthetic plan and risks discussed with sibling.

## 2024-11-18 NOTE — ASSESSMENT & PLAN NOTE
Patient with long history of alcoholic cirrhosis, portal hypertension, and multiple varices.  Admission MELD score 29. 11/18 MELD-Na is 41  Resultant thrombocytopenia, transaminitis, and hypoglycemia.  Ceftriaxone for SBP prophylaxis. Day #3/7, end on 11/22  Gastroenterology following, recs below:  Patient will need volume paracentesis  RUQ abdominal US with Doppler for HCC screening

## 2024-11-18 NOTE — ASSESSMENT & PLAN NOTE
Likely multifactorial, to include low flow state, hepatorenal syndrome, hypovolemia. UOP over the past 24 hrs is 107, despite Bumex gtt.   Aggressive resuscitation  Avoid nephrotoxics and hypotension  Continue to monitor renal indices and urinary output  Bumex gtt  Nephrology consulted, recs below:  Plan to initiate CRRT

## 2024-11-18 NOTE — PROGRESS NOTES
Progress Note - Critical Care/ICU   Name: Duncan Pearce 59 y.o. male I MRN: [id number]  Unit/Bed#: ICU 11 I Date of Admission: 11/16/2024   Date of Service: 11/18/2024 I Hospital Day: 2      Assessment & Plan  Hemorrhagic shock (HCC)  Hypotension with Hgb 4.7 on presentation with distended abd and altered mental status.  Code crimson initiated by emergency room, and again in the ICU.   PRBC-6, FFP-3, platelets -2 packs, cryoprecipitate.  Vitamin K 10 mg IV, PCC administered.   EGD revealed source to be briskly bleeding esophageal varices, hemostasis achieved.  S/p esophageal varices banding on 11/16  Continue to monitor hemoglobins every 6 hours and transfuse as needed.  Further treatment for his alcoholic cirrhosis per gastroenterology  Continue octreotide and Protonix.  ABLA (acute blood loss anemia)  On ER presentation, hemoglobin 4.7 with severe hypotension requiring code Crimson to stabilize  Patient required second code Crimson as he decompensated a second time after admission to the ICU.  Emergent EGD revealed esophageal varices with active brisk bleeding, hemostasis achieved.  Patient required multiple blood products including packed red blood cells, FFP, platelets, and cryoprecipitate  Monitoring hemoglobins every 6 hours.  Transfuse as needed    GLORY (acute kidney injury) (HCC)  Likely multifactorial, to include low flow state, hepatorenal syndrome, hypovolemia. UOP over the past 24 hrs is 107, despite Bumex gtt.   Aggressive resuscitation  Avoid nephrotoxics and hypotension  Continue to monitor renal indices and urinary output  Bumex gtt  Nephrology consulted, recs below:  Plan to initiate CRRT   Thrombocytopenia (HCC)  Likely related to liver disease  Platelet transfusions as needed as part of balance transfusion  11/18 2 units FFP administered prior to central line placement   Hyperammonemia (HCC)  Ammonia level 449 on admission. Dropped to 186 on 11/18.  Lactulose enemas in attempt to drop ammonia  levels  Lack of gastric conduit for enteral administration  If patient is on PO, can start rifaximin  Toxic metabolic encephalopathy  Likely multifactorial to include low flow secondary to shock, metabolic encephalopathy, alcoholic encephalopathy  Delirium precautions  Serial neuroexams  Maintain cerebral perfusion with mean arterial pressures greater than 65  Support oxygen carrying capacity and perfusion with transfusions as needed.  Monitor for signs of alcohol withdrawal  GI bleed  Secondary to actively bleeding esophageal varices  EGD on 11/18  Gastroenterology following  Continue octreotide and Protonix  Monitor hemoglobins and transfuse as needed    Acute respiratory failure (HCC)  Patient required emergent intubation and mechanical ventilation due to hypoxia and need for airway protection.  Follow ABGs, CXR as needed.  Wean ventilator as tolerated. Bronchodilators as needed.  VAP prophylaxis.  Attempt SBT on 11/19    ETOH abuse  Continue thiamine, folic acid, multivitamin intravenously daily.  Monitor closely for signs of withdrawal  CIWA protocol when appropriate  Alcoholic cirrhosis (HCC)  Patient with long history of alcoholic cirrhosis, portal hypertension, and multiple varices.  Admission MELD score 29. 11/18 MELD-Na is 41  Resultant thrombocytopenia, transaminitis, and hypoglycemia.  Ceftriaxone for SBP prophylaxis. Day #3/7, end on 11/22  Gastroenterology following, recs below:  Patient will need volume paracentesis  RUQ abdominal US with Doppler for HCC screening  Transaminitis  Related to alcoholic cirrhosis and likely exacerbated by hypotension. AST, ALT, and Alk Phos maximums were 7596, 1171, and 304, respectively. Decreased to 2410, 704, 222 on 11/18.  Continue to trend hepatic functions  Hypoglycemia  Alcoholic cirrhosis likely causing impaired gluconeogenesis.  Support glucose as needed.   Hypoglycemia protocol   Glucose checks frequently  Leukocytosis  Patient with elevated WBC at 19.14 with  23% bands. Febrile. Concerns for infection.   CXR to evaluate for VAP   Consider diagnostic SBP  Disposition: Critical care    ICU Core Measures     Vented Patient  VAP Bundle  VAP bundle ordered     A: Assess, Prevent, and Manage Pain Has pain been assessed? Yes  Need for changes to pain regimen? No   B: Both Spontaneous Awakening Trials (SATs) and Spontaneous Breathing Trials (SBTs) Plan to perform spontaneous awakening trial today? No secondary to active hemorrhage  Plan to perform spontaneous breathing trial today? No secondary to active hemorrhage  Obvious barriers to extubation? Yes   C: Choice of Sedation RASS Goal: -4 Deep Sedation  Need for changes to sedation or analgesia regimen? No   D: Delirium CAM-ICU: Unable to perform secondary to Acute cognitive dysfunction   E: Early Mobility  Plan for early mobility? Yes   F: Family Engagement Plan for family engagement today? Yes       Antibiotic Review: ceftriaxone for SBP prophylaxis    Review of Invasive Devices:    Gabe Plan: Continue for accurate I/O monitoring for 48 hours  Central access plan: Medications requiring central line Hemodynamic monitoring      Prophylaxis:  VTE VTE covered by:    None    Contraindicated secondary to: GI Bleed   Stress Ulcer  covered bypantoprazole (PROTONIX) injection 40 mg [[id number]]         24 Hour Events : No acute overnight events.   Subjective   Review of Systems: Review of Systems not obtainable due to Clinical Condition    Objective :                   Vitals I/O      Most Recent Min/Max in 24hrs   Temp 99.9 °F (37.7 °C) Temp  Min: 99.1 °F (37.3 °C)  Max: 100.9 °F (38.3 °C)   Pulse (!) 109 Pulse  Min: 101  Max: 118   Resp (!) 30 Resp  Min: 16  Max: 33   /59 BP  Min: 85/48  Max: 116/57   O2 Sat 94 % SpO2  Min: 93 %  Max: 98 %      Intake/Output Summary (Last 24 hours) at 11/18/2024 1359  Last data filed at 11/18/2024 1335  Gross per 24 hour   Intake 1896.14 ml   Output 2118 ml   Net -221.86 ml       Diet  NPO    Invasive Monitoring           Physical Exam   Physical Exam  Cardiovascular:      Rate and Rhythm: Regular rhythm. Tachycardia present.      Heart sounds: Normal heart sounds.   Musculoskeletal:      Right lower le+ Edema present.      Left lower le+ Edema present.   Abdominal: General: There is distension.     Palpations: Abdomen is rigid. There is fluid wave.   Constitutional:       Appearance: He is ill-appearing and toxic-appearing.      Interventions: He is sedated and intubated.   Pulmonary:      Effort: He is intubated.      Comments: Coarse breath sounds bilaterally   Neurological:      Mental Status: He is unresponsive.          Diagnostic Studies        Lab Results: I have reviewed the following results:     Medications:  Scheduled PRN   EPINEPHrine PF, ,   Albumin 25%, 12.5 g, Q6H  cefTRIAXone, 1,000 mg, Q12H  chlorhexidine, 15 mL, Q12H STACI  folic acid 1 mg, thiamine (VITAMIN B1) 100 mg in sodium chloride 0.9 % 100 mL IV piggyback, , Daily  lactulose, 200 g, Q6H  pantoprazole, 40 mg, Q12H STACI      EPINEPHrine PF, ,   influenza vaccine, 0.5 mL, Prior to discharge  midazolam, 2 mg, Q1H PRN  pneumococcal 20-hiwot conj vacc, 0.5 mL, Prior to discharge       Continuous    bumetanide (BUMEX) 12.5 mg infusion 50 mL, 1 mg/hr, Last Rate: 1 mg/hr (24 0204)  dextrose 5 % and sodium chloride 0.9 %, 50 mL/hr, Last Rate: 50 mL/hr (24 1339)  fentaNYL, 100 mcg/hr, Last Rate: Stopped (24 1045)  norepinephrine, 1-30 mcg/min, Last Rate: 8 mcg/min (24 1321)  NxStage K 4/Ca 3, 20,000 mL  octreotide, 50 mcg/hr, Last Rate: 50 mcg/hr (24 0542)  propofol, 5-50 mcg/kg/min         Labs:   CBC    Recent Labs     24  0500 24  0827   WBC 18.72* 19.14*   HGB 10.0* 10.0*   HCT 31.6* 31.9*   PLT 51* 55*   BANDSPCT  --  23*     BMP    Recent Labs     24  0823 24  1214   SODIUM 144 145   K 3.6 3.6    106   CO2 23 22   AGAP 16* 17*   BUN 56* 58*   CREATININE 4.74*  5.01*   CALCIUM 8.8 8.8       Coags    Recent Labs     11/17/24  0509 11/18/24  0500   INR 2.61* 3.24*        Additional Electrolytes  Recent Labs     11/18/24  0500 11/18/24  0823 11/18/24  1214   MG  --  2.4 2.4   PHOS 3.0  --  3.5   CAIONIZED  --  1.07* 1.08*          Blood Gas    Recent Labs     11/17/24  0510   PHART 7.501*   [id number] 34.6*   PO2ART 93.5   [id number] 26.4   BEART 3.4   SOURCE Line, Arterial     Recent Labs     11/17/24  0510   SOURCE Line, Arterial    LFTs  Recent Labs     11/17/24  0509 11/18/24  0500   ALT 1,049* 704*   AST 6,014* 2,410*   ALKPHOS 268* 222*   ALB 3.4* 3.0*   TBILI 6.47* 7.85*       Infectious  No recent results  Glucose  Recent Labs     11/18/24  0016 11/18/24  0500 11/18/24  0823 11/18/24  1214   GLUC 60* 72 51* 33*

## 2024-11-18 NOTE — RESPIRATORY THERAPY NOTE
RT Ventilator Management Note  Duncan Pearce 59 y.o. male MRN: [id number]  Unit/Bed#: ICU 11 Encounter: [id number]      Daily Screen         11/17/2024 0711 11/18/2024 0729          Patient safety screen outcome:: Failed Failed (P)       Not Ready for Weaning due to:: Underline problem not resolved;FiO2 >60% Underline problem not resolved (P)                 Physical Exam:   Assessment Type: (P) Assess only  General Appearance: (P) Sedated  Respiratory Pattern: (P) Assisted, Tachypneic  Chest Assessment: (P) Chest expansion symmetrical  Bilateral Breath Sounds: (P) Coarse  Suction: (P) ET Tube  O2 Device: (P) vent      Resp Comments: (P) Pt remains on full vent support.  No changes made at this time.  Will wean as able and appropriate.     11/18/24 0729   Respiratory Assessment   Assessment Type Assess only   General Appearance Sedated   Respiratory Pattern Assisted;Tachypneic   Chest Assessment Chest expansion symmetrical   Bilateral Breath Sounds Coarse   Suction ET Tube   Resp Comments Pt remains on full vent support.  No changes made at this time.  Will wean as able and appropriate.   O2 Device vent   Vent Information   Vent ID [id number]   Vent type Bella C3   Bella Vent Mode APVcmv   $ Vent Daily Charge-Subsequent Yes   $ Pulse Oximetry Spot Check Charge Completed   SpO2 93 %   APVcmv Settings   Resp Rate (BPM) 16 BPM   VT (mL) 450 mL   Insp Time (sec) 0.85 sec   FIO2 (%) 60 %   PEEP (cmH2O) 8 cmH2O   I:E Ratio 1:1.3   Insp Resistance 0   P-ramp (ms) 100 (ms)   Flow Trigger (LPM) 2 LPM   Humidification Heater   Heater Temp 98.6 °F (37 °C)   APVcmv Actuals   Resp Rate (BPM) 31 BPM   VT (mL) 484 mL   MV (Obs) 14.6   MAP (cmH2O) 11 cmH2O   Peak Pressure (cmH2O) 17 cmH2O   I:E Ratio (Obs) 1:1.3   Static Compliance (mL/cmH20) 57 mL/cmH2O   Plateau Pressure (cm H2O)   (unable to obtain d/t spont breaths)   Heater Temperature (Obs) 98.4 °F (36.9 °C)   APVcmv ALARMS   High Peak Pressure (cmH2O) 45 cmH2O   Low  Peak Pressure (cmH2O) 5 cm H2O   High Resp Rate (BPM) 40 BPM   High MV (L/min) 18 L/min   Low MV (L/min) 5 L/min   High VT (mL) 900 mL   Low VT (mL) 200 mL   Leak (%) 0 %   Apnea Time (s) 20 S   Maintenance   Alarm (pink) cable attached No   Resuscitation bag with peep valve at bedside Yes   Water bag changed No   Circuit changed No   Daily Screen   Patient safety screen outcome: Failed   Not Ready for Weaning due to: Underline problem not resolved   IHI Ventilator Associated Pneumonia Bundle   Daily Awakening Trials Performed Yes   Daily Assessment of Readiness to Extubate Yes   Head of Bed Elevated HOB 30   ETT  Hi-Lo;Cuffed 8 mm   Placement Date/Time: 11/16/24 c) 2058   Type: Hi-Lo;Cuffed  Tube Size: 8 mm  Location: Oral  Insertion attempts: 2  Placement Verification: Chest x-ray;End tidal CO2   Secured at (cm) 24   Measured from Teeth   Secured Location (S)  Left   Repositioned (S)  Center to Left   Secured by Commercial tube irizarry   Site Condition Dry   Cuff Pressure (color) Green   HI-LO Suction  Continuous low suction   HI-LO Secretions Small;Blood tinged   HI-LO Intervention Patent

## 2024-11-18 NOTE — RESPIRATORY THERAPY NOTE
11/18/24 040   Respiratory Assessment   General Appearance Sedated   Respiratory Pattern Assisted;Spontaneous   Chest Assessment Chest expansion symmetrical   Bilateral Breath Sounds Coarse   Suction ET Tube   Resp Comments no acute changes   O2 Device ventilator   Vent Information   Vent ID [id number]   Vent type Bella C3   Bella Vent Mode APVcmv   $ Pulse Oximetry Spot Check Charge Completed   SpO2 96 %   APVcmv Settings   Resp Rate (BPM) 16 BPM   VT (mL) 450 mL   Insp Time (sec) 0.85 sec   FIO2 (%) 60 %   PEEP (cmH2O) 8 cmH2O   Insp Resistance 5  (obs)   P-ramp (ms) 100 (ms)   Flow Trigger (LPM) 2 LPM   Humidification Heater   Heater Temp 98.6 °F (37 °C)   APVcmv Actuals   Resp Rate (BPM) 28 BPM   VT (mL) 430 mL   MV (Obs) 12.2   MAP (cmH2O) 11 cmH2O   Peak Pressure (cmH2O) 17 cmH2O   I:E Ratio (Obs) 1:1.4   Static Compliance (mL/cmH20) 59.5 mL/cmH2O   Plateau Pressure (cm H2O) 19.71 cm H2O   Heater Temperature (Obs) 98.4 °F (36.9 °C)   APVcmv ALARMS   High Peak Pressure (cmH2O) 45 cmH2O   Low Peak Pressure (cmH2O) 5 cm H2O   High Resp Rate (BPM) 40 BPM   High MV (L/min) 15 L/min   Low MV (L/min) 4 L/min   High VT (mL) 900 mL   Low VT (mL) 200 mL   Maintenance   Alarm (pink) cable attached No   Resuscitation bag with peep valve at bedside Yes   Water bag changed No   Circuit changed No   IHI Ventilator Associated Pneumonia Bundle   Head of Bed Elevated HOB 30   ETT  Hi-Lo;Cuffed 8 mm   Placement Date/Time: 11/16/24 (c) 2187   Type: Hi-Lo;Cuffed  Tube Size: 8 mm  Location: Oral  Insertion attempts: 2  Placement Verification: Chest x-ray;End tidal CO2   Secured at (cm) 24   Measured from Teeth   Secured Location (S)  Center   Repositioned Right to Center   Secured by Commercial tube irizarry   Site Condition Dry   Cuff Pressure (color) Green   HI-LO Suction  Continuous low suction   HI-LO Secretions Scant   HI-LO Intervention Patent

## 2024-11-18 NOTE — ASSESSMENT & PLAN NOTE
Likely related to liver disease  Platelet transfusions as needed as part of balance transfusion  11/18 2 units FFP administered prior to central line placement

## 2024-11-18 NOTE — ASSESSMENT & PLAN NOTE
Related to alcoholic cirrhosis and likely exacerbated by hypotension. AST, ALT, and Alk Phos maximums were 7596, 1171, and 304, respectively. Decreased to 2410, 704, 222 on 11/18.  Continue to trend hepatic functions

## 2024-11-18 NOTE — ASSESSMENT & PLAN NOTE
Patient required emergent intubation and mechanical ventilation due to hypoxia and need for airway protection.  Follow ABGs, CXR as needed.  Wean ventilator as tolerated. Bronchodilators as needed.  VAP prophylaxis.  Attempt SBT on 11/19

## 2024-11-18 NOTE — PLAN OF CARE
Problem: SAFETY,RESTRAINT: NV/NON-SELF DESTRUCTIVE BEHAVIOR  Goal: Remains free of harm/injury (restraint for non violent/non self-detsructive behavior)  Description: INTERVENTIONS:  - Instruct patient/family regarding restraint use   - Assess and monitor physiologic and psychological status   - Provide interventions and comfort measures to meet assessed patient needs   - Identify and implement measures to help patient regain control  - Assess readiness for release of restraint   Outcome: Progressing     Problem: PAIN - ADULT  Goal: Verbalizes/displays adequate comfort level or baseline comfort level  Description: Interventions:  - Encourage patient to monitor pain and request assistance  - Assess pain using appropriate pain scale  - Administer analgesics based on type and severity of pain and evaluate response  - Implement non-pharmacological measures as appropriate and evaluate response  - Consider cultural and social influences on pain and pain management  - Notify physician/advanced practitioner if interventions unsuccessful or patient reports new pain  Outcome: Progressing     Problem: INFECTION - ADULT  Goal: Absence or prevention of progression during hospitalization  Description: INTERVENTIONS:  - Assess and monitor for signs and symptoms of infection  - Monitor lab/diagnostic results  - Monitor all insertion sites, i.e. indwelling lines, tubes, and drains  - Monitor endotracheal if appropriate and nasal secretions for changes in amount and color  - [place] appropriate cooling/warming therapies per order  - Administer medications as ordered  - Instruct and encourage patient and family to use good hand hygiene technique  - Identify and instruct in appropriate isolation precautions for identified infection/condition  Outcome: Progressing     Problem: SAFETY ADULT  Goal: Patient will remain free of falls  Description: INTERVENTIONS:  - Educate patient/family on patient safety including physical  limitations  - Instruct patient to call for assistance with activity   - Consult OT/PT to assist with strengthening/mobility   - Keep Call bell within reach  - Keep bed low and locked with side rails adjusted as appropriate  - Keep care items and personal belongings within reach  - Initiate and maintain comfort rounds  - Make Fall Risk Sign visible to staff  - Offer Toileting every 2 Hours, in advance of need  - Initiate/Maintain bed alarm  - Apply yellow socks and bracelet for high fall risk patients  - Consider moving patient to room near nurses station  Outcome: Progressing     Problem: Prexisting or High Potential for Compromised Skin Integrity  Goal: Skin integrity is maintained or improved  Description: INTERVENTIONS:  - Identify patients at risk for skin breakdown  - Assess and monitor skin integrity  - Assess and monitor nutrition and hydration status  - Monitor labs   - Assess for incontinence   - Turn and reposition patient  - Assist with mobility/ambulation  - Relieve pressure over bony prominences  - Avoid friction and shearing  - Provide appropriate hygiene as needed including keeping skin clean and dry  - Evaluate need for skin moisturizer/barrier cream  - Collaborate with interdisciplinary team   - Patient/family teaching  - Consider wound care consult   Outcome: Progressing

## 2024-11-18 NOTE — ASSESSMENT & PLAN NOTE
Secondary to actively bleeding esophageal varices  EGD on 11/18  Gastroenterology following  Continue octreotide and Protonix  Monitor hemoglobins and transfuse as needed

## 2024-11-18 NOTE — RESPIRATORY THERAPY NOTE
11/17/24 2030   Respiratory Assessment   General Appearance Sedated   Respiratory Pattern Assisted;Spontaneous   Chest Assessment Chest expansion symmetrical   Bilateral Breath Sounds Diminished   Resp Comments pt remains intubated and mechanically ventilated   O2 Device ventilator   Vent Information   Vent ID [id number]   Vent type Bella C3   Bella Vent Mode APVcmv   Is the patient reintubated? No   $ Pulse Oximetry Spot Check Charge Completed   SpO2 94 %   APVcmv Settings   Resp Rate (BPM) 16 BPM   VT (mL) 450 mL   Insp Time (sec) 0.85 sec   FIO2 (%) 60 %   PEEP (cmH2O) 8 cmH2O   Insp Resistance 11  (obs)   P-ramp (ms) 100 (ms)   Flow Trigger (LPM) 2 LPM   Humidification Heater   Heater Temp 98.6 °F (37 °C)   APVcmv Actuals   Resp Rate (BPM) 26 BPM   VT (mL) 411 mL   MV (Obs) 9.9   MAP (cmH2O) 12 cmH2O   Peak Pressure (cmH2O) 19 cmH2O   I:E Ratio (Obs) 1:1.8   Static Compliance (mL/cmH20) 38.2 mL/cmH2O   Plateau Pressure (cm H2O) 20.16 cm H2O   Heater Temperature (Obs) 98.2 °F (36.8 °C)   APVcmv ALARMS   High Peak Pressure (cmH2O) 45 cmH2O   Low Peak Pressure (cmH2O) 5 cm H2O   High Resp Rate (BPM) 40 BPM   High MV (L/min) 15 L/min   Low MV (L/min) 4 L/min   High VT (mL) 900 mL   Low VT (mL) 200 mL   Maintenance   Alarm (pink) cable attached No   Resuscitation bag with peep valve at bedside Yes   Water bag changed No   Circuit changed No   IHI Ventilator Associated Pneumonia Bundle   Head of Bed Elevated HOB 30   ETT  Hi-Lo;Cuffed 8 mm   Placement Date/Time: 11/16/24 c) 4268   Type: Hi-Lo;Cuffed  Tube Size: 8 mm  Location: Oral  Insertion attempts: 2  Placement Verification: Chest x-ray;End tidal CO2   Secured at (cm) 24   Measured from Teeth   Secured Location (S)  Center   Repositioned Left to Center   Secured by Commercial tube irizarry   Site Condition Dry   Cuff Pressure (cm H2O)   (MLT)   Cuff Pressure (color) Green   HI-LO Suction  Continuous low suction   HI-LO Secretions Scant   HI-LO Intervention  Patent
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
## 2024-11-18 NOTE — ASSESSMENT & PLAN NOTE
Present ammonia level of 449 elevated in the setting of decompensated alcoholic liver cirrhosis.  Patient had been receiving lactulose.

## 2024-11-18 NOTE — ASSESSMENT & PLAN NOTE
Present with significant transaminitis with AST of 7000 596 and ALT of 1000 127 on admission.  Noted to trend down and LFTs.

## 2024-11-18 NOTE — RESPIRATORY THERAPY NOTE
RT Ventilator Management Note  Duncan Pearce 59 y.o. male MRN: [id number]  Unit/Bed#: ICU 11 Encounter: [id number]      Daily Screen         11/18/2024  0729 11/18/2024  1520          Patient safety screen outcome:: Failed Failed (P)       Not Ready for Weaning due to:: Underline problem not resolved Underline problem not resolved (P)                 Physical Exam:   Assessment Type: (P) Assess only  General Appearance: (P) Sedated  Respiratory Pattern: (P) Assisted  Chest Assessment: (P) Chest expansion symmetrical  Bilateral Breath Sounds: (P) Coarse  Suction: (P) ET Tube  O2 Device: (P) vent      Resp Comments: (P) Pt remains on full vent support.  No changes made at this time.  Will wean as able.     11/18/24 1520   Respiratory Assessment   Assessment Type Assess only   General Appearance Sedated   Respiratory Pattern Assisted   Chest Assessment Chest expansion symmetrical   Bilateral Breath Sounds Coarse   Suction ET Tube   Resp Comments Pt remains on full vent support.  No changes made at this time.  Will wean as able.   O2 Device vent   Vent Information   Vent ID [id number]   Vent type Bella C3   Bella Vent Mode APVcmv   $ Pulse Oximetry Spot Check Charge Completed   SpO2 94 %   APVcmv Settings   Resp Rate (BPM) 16 BPM   VT (mL) 450 mL   Insp Time (sec) 0.74 sec   FIO2 (%) 60 %   PEEP (cmH2O) 8 cmH2O   I:E Ratio 1:2.1   Insp Resistance 0   P-ramp (ms) 100 (ms)   Flow Trigger (LPM) 2 LPM   Humidification Heater   Heater Temp 98.6 °F (37 °C)   APVcmv Actuals   Resp Rate (BPM) 29 BPM   VT (mL) 545 mL   MV (Obs) 15.4   MAP (cmH2O) 10 cmH2O   Peak Pressure (cmH2O) 17 cmH2O   I:E Ratio (Obs) 1:2.1   Static Compliance (mL/cmH20) 78.3 mL/cmH2O   Plateau Pressure (cm H2O)   (unable to obtain)   Heater Temperature (Obs) 98.6 °F (37 °C)   APVcmv ALARMS   High Peak Pressure (cmH2O) 45 cmH2O   Low Peak Pressure (cmH2O) 5 cm H2O   High Resp Rate (BPM) 40 BPM   High MV (L/min) 18 L/min   Low MV (L/min) 5 L/min   High VT  (mL) 900 mL   Low VT (mL) 200 mL   Leak (%) 1 %   Apnea Time (s) 20 S   Maintenance   Alarm (pink) cable attached No   Resuscitation bag with peep valve at bedside Yes   Water bag changed Yes   Circuit changed No   Daily Screen   Patient safety screen outcome: Failed   Not Ready for Weaning due to: Underline problem not resolved   ETT  Hi-Lo;Cuffed 8 mm   Placement Date/Time: 11/16/24 (c) 0009   Type: Hi-Lo;Cuffed  Tube Size: 8 mm  Location: Oral  Insertion attempts: 2  Placement Verification: Chest x-ray;End tidal CO2   Secured at (cm) 24   Measured from Teeth   Secured Location (S)  Center   Repositioned (S)  Left to Center   Secured by Commercial tube irizarry   Site Condition Dry   Cuff Pressure (color) Green   HI-LO Suction  Continuous low suction   HI-LO Secretions Scant   HI-LO Intervention Patent

## 2024-11-18 NOTE — RESPIRATORY THERAPY NOTE
Flow sensor changed at this time.     11/18/24 1025   Respiratory Assessment   Resp Comments Pt remains of full vent support.  No changes made at this time.  Pt for EGD sometime today.  Will wean when able and appropriate.   O2 Device vent   Vent Information   Vent ID [id number]   Vent type Bella C3   Bella Vent Mode APVcmv   $ Pulse Oximetry Spot Check Charge Completed   SpO2 93 %   APVcmv Settings   Resp Rate (BPM) 16 BPM   VT (mL) 450 mL   Insp Time (sec) (S)  0.74 sec   FIO2 (%) 60 %   PEEP (cmH2O) 8 cmH2O   I:E Ratio 1:1.7   Insp Resistance 1   P-ramp (ms) 100 (ms)   Flow Trigger (LPM) 2 LPM   Humidification Heater   Heater Temp 98.6 °F (37 °C)   APVcmv Actuals   Resp Rate (BPM) 29 BPM   VT (mL) 510 mL   MV (Obs) 15.4   MAP (cmH2O) 10 cmH2O   Peak Pressure (cmH2O) 16 cmH2O   I:E Ratio (Obs) 1:1.7   Static Compliance (mL/cmH20) 86.9 mL/cmH2O   Plateau Pressure (cm H2O)   (unable to obtain)   Heater Temperature (Obs) 97.3 °F (36.3 °C)   APVcmv ALARMS   High Peak Pressure (cmH2O) 45 cmH2O   Low Peak Pressure (cmH2O) 5 cm H2O   High Resp Rate (BPM) 40 BPM   High MV (L/min) 18 L/min   Low MV (L/min) 5 L/min   High VT (mL) 900 mL   Low VT (mL) 200 mL   Leak (%) 3 %   Apnea Time (s) 20 S   Maintenance   Alarm (pink) cable attached No   Resuscitation bag with peep valve at bedside Yes   Water bag changed No   Circuit changed No   ETT  Hi-Lo;Cuffed 8 mm   Placement Date/Time: 11/16/24 (c) 8701   Type: Hi-Lo;Cuffed  Tube Size: 8 mm  Location: Oral  Insertion attempts: 2  Placement Verification: Chest x-ray;End tidal CO2   Secured at (cm) 24   Measured from Teeth   Cuff Pressure (color) Green

## 2024-11-18 NOTE — ANESTHESIA POSTPROCEDURE EVALUATION
Post-Op Assessment Note    CV Status:  Stable    Pain management: adequate       Mental Status:  Somnolent   Hydration Status:  Euvolemic   Airway Patency:  Patent  Airway: intubated     Post Op Vitals Reviewed: Yes    No anethesia notable event occurred.    Staff: Anesthesiologist           Last Filed PACU Vitals:  Vitals Value Taken Time   Temp 100.04 °F (37.8 °C) 11/18/24 1444   Pulse 110 11/18/24 1444   BP 91/51 11/18/24 1443   Resp 26 11/18/24 1444   SpO2 93 % 11/18/24 1444   Vitals shown include unfiled device data.    Modified Judith:  No data recorded

## 2024-11-18 NOTE — ASSESSMENT & PLAN NOTE
Alcoholic cirrhosis likely causing impaired gluconeogenesis.  Support glucose as needed.   Hypoglycemia protocol   Glucose checks frequently

## 2024-11-18 NOTE — CONSULTS
NEPHROLOGY HOSPITAL CONSULTATION   Duncan Pearce 59 y.o. male MRN: [id number]  Unit/Bed#: ICU 11 Encounter: [id number]    Brief History of Admission -59 years old male with past medical history of alcohol dependence, alcoholic liver cirrhosis, spontaneous bacterial peritonitis presented to our facility after being informed about patient being confused.  Patient found on the floor by EMS.    Assessment & Plan  GLORY (acute kidney injury) (HCC)  Baseline renal function within normal range.  Presented to facility creatinine 1.7 mg/dL and elevated.  Noted progressive decline in renal function along with urine output likely as result of ischemic ATN from hemorrhagic shock.  Urine output is 107 cc in the past 24 hours with serum creatinine peaked up to 4.4 mg/dL and elevated.  Patient had not responded to Bumex gtt. and urine output remains dismal.  Will plan to initiate patient on continuous venovenous hemodiafiltration.  Hemorrhagic shock (HCC)  Presented with a hemoglobin of 4.7 g/dL and low.  Etiology was variceal bleeding.  Patient received FFP, cryoprecipitate and PRBC with current hemoglobin of 10 g/dL and improved.  ETOH abuse  Known history of alcoholic cirrhosis with active EtOH use.  Acute respiratory failure (HCC)  Patient had required mechanical ventilation since admission.  Alcoholic cirrhosis (HCC)  Known history alcoholic liver cirrhosis and spontaneous bacterial peritonitis.  Transaminitis  Present with significant transaminitis with AST of 7000 596 and ALT of 1000 127 on admission.  Noted to trend down and LFTs.  Thrombocytopenia (HCC)  Platelet count of 55K and low.  Hyperammonemia (HCC)  Present ammonia level of 449 elevated in the setting of decompensated alcoholic liver cirrhosis.  Patient had been receiving lactulose.  Encephalopathy  Likely due to hyperammonemia and noted on admission.  Coagulopathy (HCC)  Remains regular path with INR of 3.24.      HISTORY OF PRESENT ILLNESS:  Requesting  Physician: Anthony Garcia MD  Reason for Consult: Acute kidney injury    Duncan Pearce is a 59 y.o. male who was admitted to [facility] after presenting with hemorrhagic shock. A renal consultation is requested today for assistance in the management of oliguric GLORY.  Patient was brought to ED yesterday via EMS after being found down on floor at home.  Patient was found to be hypoglycemic and received glucagon.  In the ED blood gas revealed pH of 6.93 and a hemoglobin of 4.7 g/dL.  Other lab normalities included lactic acid of 25, ammonia level of 449 and serum creatinine of 1.77 mg/dL.  Code Roque was called and patient received FFP, cryoprecipitate and packed red blood cell transfusion.  Patient was also initiated on bicarbonate drip.  EGD revealed grade 3 esophageal varices with active variceal hemorrhage.  Patient was initiated on to try drip, Protonix drip with control of bleeding.  Nephrology is consulted due to worsening renal parameters and serum creatinine that peaked up to 4.74 mg/dL.  His urine output is noted to be 107 cc in the past 24 hours as well.  Patient is currently intubated and sedated.    PAST MEDICAL HISTORY:  Past Medical History:   Diagnosis Date    ETOH abuse     Liver cirrhosis (HCC)     SBP (spontaneous bacterial peritonitis) (HCC) 2019       PAST SURGICAL HISTORY:  Past Surgical History:   Procedure Laterality Date    COLONOSCOPY N/A 7/3/2017    Procedure: COLONOSCOPY;  Surgeon: Humberto Butler MD;  Location: [facility];  Service: Gastroenterology    ESOPHAGOGASTRODUODENOSCOPY N/A 6/25/2017    Procedure: ESOPHAGOGASTRODUODENOSCOPY (EGD);  Surgeon: Indio Manrique III, MD;  Location: [facility];  Service: Gastroenterology    IR PARACENTESIS  12/31/2018       ALLERGIES:  No Known Allergies    SOCIAL HISTORY:  Social History     Substance and Sexual Activity   Alcohol Use Yes    Comment: vodka -approx one bottle every nite.      Social History     Substance and Sexual  Activity   Drug Use No     Social History     Tobacco Use   Smoking Status Every Day    Current packs/day: 1.00    Average packs/day: 1 pack/day for 25.0 years (25.0 ttl pk-yrs)    Types: Cigarettes   Smokeless Tobacco Never       FAMILY HISTORY:  History reviewed. No pertinent family history.    MEDICATIONS:    Current Facility-Administered Medications:     bumetanide (BUMEX) 12.5 mg infusion 50 mL, 1 mg/hr, Intravenous, Continuous, IRAJ Wagner, Last Rate: 4 mL/hr at 11/18/24 0204, 1 mg/hr at 11/18/24 0204    ceftriaxone (ROCEPHIN) 1 g/50 mL in dextrose IVPB, 1,000 mg, Intravenous, Q12H, IRAJ Montes, Last Rate: 100 mL/hr at 11/18/24 0226, 1,000 mg at 11/18/24 0226    chlorhexidine (PERIDEX) 0.12 % oral rinse 15 mL, 15 mL, Mouth/Throat, Q12H STACI, IRAJ Montes, 15 mL at 11/18/24 0829    fentaNYL 1000 mcg in sodium chloride 0.9% 100mL infusion, 100 mcg/hr, Intravenous, Continuous, IRAJ Ching, Stopped at 11/17/24 1045    folic acid 1 mg, thiamine (VITAMIN B1) 100 mg in sodium chloride 0.9 % 100 mL IV piggyback, , Intravenous, Daily, IRAJ Montes, New Bag at 11/18/24 0832    influenza vaccine, recombinant (PF) (Flublok) IM injection 0.5 mL, 0.5 mL, Intramuscular, Prior to discharge, Hellen Emmanuel MD    lactulose retention enema 200 g, 200 g, Rectal, Q6H, IRAJ Ching, 200 g at 11/18/24 0600    midazolam (VERSED) injection 2 mg, 2 mg, Intravenous, Q1H PRN, Hellen Emmanuel MD, 2 mg at 11/16/24 2014    NOREPINEPHRINE 4 MG  ML NSS (CMPD ORDER) infusion, 1-30 mcg/min, Intravenous, Titrated, IRAJ Ching, Last Rate: 22.5 mL/hr at 11/18/24 0913, 6 mcg/min at 11/18/24 0913    octreotide (SandoSTATIN) 500 mcg in sodium chloride 0.9 % 250 mL infusion, 50 mcg/hr, Intravenous, Continuous, Chuyita Galvan MD, Last Rate: 25 mL/hr at 11/18/24 0542, 50 mcg/hr at 11/18/24 0542    pantoprazole (PROTONIX) injection 40  mg, 40 mg, Intravenous, Q12H [facility], IRAJ Ching, 40 mg at 11/18/24 0829    pneumococcal 20-hiwot conj vacc (PREVNAR 20) IM Injection 0.5 mL, 0.5 mL, Intramuscular, Prior to discharge, Hellen Emmanuel MD    REVIEW OF SYSTEMS:    Patient intubated and sedated    PHYSICAL EXAM:  Current Weight: Weight - Scale: 78.9 kg (174 lb)  First Weight: Weight - Scale: 83.4 kg (183 lb 13.8 oz)  Vitals:    11/18/24 0700 11/18/24 0729 11/18/24 0800 11/18/24 0900   BP: 90/54  92/54 (!) 86/53   BP Location: Right arm  Right arm Right arm   Pulse: (!) 118  (!) 112 (!) 112   Resp: 17  (!) 28 (!) 29   Temp: (!) 100.8 °F (38.2 °C)  (!) 100.9 °F (38.3 °C) (!) 100.6 °F (38.1 °C)   TempSrc: Esophageal  Esophageal Esophageal   SpO2: 93% 93% 93% 93%   Weight:       Height:           Intake/Output Summary (Last 24 hours) at 11/18/2024 1004  Last data filed at 11/18/2024 0800  Gross per 24 hour   Intake 1164.72 ml   Output 1728 ml   Net -563.28 ml     Physical Exam  HENT:      Head: Normocephalic and atraumatic.   Eyes:      Pupils: Pupils are equal, round, and reactive to light.   Neck:      Vascular: No JVD.   Cardiovascular:      Rate and Rhythm: Normal rate and regular rhythm.      Heart sounds: Normal heart sounds. No murmur heard.     No friction rub.   Pulmonary:      Comments: Ventilator dependent  Abdominal:      General: Bowel sounds are normal. There is no distension.      Palpations: Abdomen is soft.      Tenderness: There is no abdominal tenderness. There is no rebound.   Musculoskeletal:         General: No tenderness.      Cervical back: Neck supple.   Skin:     General: Skin is dry.      Findings: No rash.   Neurological:      Comments: Intubated           Invasive Devices:   Urethral Catheter Straight-tip;Temperature probe 16 Fr. (Active)   Amt returned on insertion(mL) 200 mL 11/16/24 0125   Reasons to continue Urinary Catheter  Accurate I&O assessment in critically ill patients (48 hr. max) 11/18/24 0800   Goal for  "Removal Voiding trial when ambulation improves 11/17/24 2010   Site Assessment Clean;Skin intact 11/18/24 0800   Bernal Care Done 11/18/24 0900   Collection Container Standard drainage bag 11/18/24 0800   Securement Method Securing device (Describe) 11/18/24 0800   Output (mL) 6 mL 11/18/24 0800     Lab Results:   Results from last 7 days   Lab Units 11/18/24  0827 11/18/24  0823 11/18/24  0500 11/18/24  0016 11/17/24  1523 11/17/24  0509 11/17/24  0013 11/16/24  0917 11/16/24  0538   WBC Thousand/uL 19.14*  --  18.72*  --   --  15.72* 17.09*   < > 24.31*   HEMOGLOBIN g/dL 10.0*  --  10.0*  --   --  10.3* 10.3*   < > 6.8*   HEMATOCRIT % 31.9*  --  31.6*  --   --  31.4* 31.5*   < > 24.0*   PLATELETS Thousands/uL 55*  --  51*  --   --  66* 67*   < > 63*   POTASSIUM mmol/L  --  3.6 3.8 3.7 3.6 3.6 3.8   < > 5.8*   CHLORIDE mmol/L  --  105 104 103 103 101 100   < > 96   CO2 mmol/L  --  23 24 25 30 32 34*   < > 10*   BUN mg/dL  --  56* 54* 51* 44* 37* 34*   < > 30*   CREATININE mg/dL  --  4.74* 4.55* 4.16* 3.49* 2.77* 2.54*   < > 1.79*   CALCIUM mg/dL  --  8.8 9.1 8.9 8.9 9.3 9.0   < > 7.3*   MAGNESIUM mg/dL  --  2.4  --  2.0 2.1 1.5*  --   --  1.6*   PHOSPHORUS mg/dL  --   --  3.0  --   --  2.4  --   --  8.7*   ALK PHOS U/L  --   --  222*  --   --  268* 273*   < > 255*   ALT U/L  --   --  704*  --   --  1,049* 1,171*   < > 904*   AST U/L  --   --  2,410*  --   --  6,014* 7,470*   < > 4,388*    < > = values in this interval not displayed.     Other Studies:     Portions of the record may have been created with voice recognition software. Occasional wrong word or \"sound a like\" substitutions may have occurred due to the inherent limitations of voice recognition software. Read the chart carefully and recognize, using context, where substitutions have occurred.If you have any questions, please contact the dictating provider.    "

## 2024-11-18 NOTE — ASSESSMENT & PLAN NOTE
Patient with elevated WBC at 19.14 with 23% bands. Febrile. Concerns for infection.   CXR to evaluate for VAP   Consider diagnostic SBP

## 2024-11-18 NOTE — RESPIRATORY THERAPY NOTE
11/18/24 0020   Respiratory Assessment   General Appearance Sedated   Respiratory Pattern Assisted;Spontaneous   Chest Assessment Chest expansion symmetrical   Bilateral Breath Sounds Diminished;Coarse;Scattered   Resp Comments no acute changes   O2 Device ventilator   Vent Information   Vent ID [id number]   Vent type Bella C3   Bella Vent Mode APVcmv   $ Pulse Oximetry Spot Check Charge Completed   SpO2 94 %   APVcmv Settings   Resp Rate (BPM) 16 BPM   VT (mL) 450 mL   Insp Time (sec) 0.85 sec   FIO2 (%) 60 %   PEEP (cmH2O) 8 cmH2O   Insp Resistance 5  (obs)   P-ramp (ms) 100 (ms)   Flow Trigger (LPM) 2 LPM   Humidification Heater   Heater Temp 98.6 °F (37 °C)   APVcmv Actuals   Resp Rate (BPM) 28 BPM   VT (mL) 454 mL   MV (Obs) 12.4   MAP (cmH2O) 11 cmH2O   Peak Pressure (cmH2O) 18 cmH2O   I:E Ratio (Obs) 1:1.4   Static Compliance (mL/cmH20) 49.7 mL/cmH2O   Plateau Pressure (cm H2O) 19.98 cm H2O   Heater Temperature (Obs) 98.1 °F (36.7 °C)   APVcmv ALARMS   High Peak Pressure (cmH2O) 45 cmH2O   Low Peak Pressure (cmH2O) 5 cm H2O   High Resp Rate (BPM) 40 BPM   High MV (L/min) 15 L/min   Low MV (L/min) 4 L/min   High VT (mL) 900 mL   Low VT (mL) 200 mL   Maintenance   Alarm (pink) cable attached No   Resuscitation bag with peep valve at bedside Yes   Water bag changed No   Circuit changed No   IHI Ventilator Associated Pneumonia Bundle   Head of Bed Elevated HOB 30   ETT  Hi-Lo;Cuffed 8 mm   Placement Date/Time: 11/16/24 (c) 5579   Type: Hi-Lo;Cuffed  Tube Size: 8 mm  Location: Oral  Insertion attempts: 2  Placement Verification: Chest x-ray;End tidal CO2   Secured at (cm) 24   Measured from Teeth   Secured Location (S)  Right   Repositioned Center to Right   Secured by Commercial tube irizarry   Site Condition Dry   Cuff Pressure (color) Green   HI-LO Suction  Continuous low suction   HI-LO Secretions Scant   HI-LO Intervention Patent

## 2024-11-19 PROBLEM — E87.20 LACTIC ACIDOSIS: Status: ACTIVE | Noted: 2024-01-01

## 2024-11-19 NOTE — PLAN OF CARE
Problem: Nutrition/Hydration-ADULT  Goal: Nutrient/Hydration intake appropriate for improving, restoring or maintaining nutritional needs  Description: Monitor and assess patient's nutrition/hydration status for malnutrition. Collaborate with interdisciplinary team and initiate plan and interventions as ordered.  Monitor patient's weight and dietary intake as ordered or per policy. Utilize nutrition screening tool and intervene as necessary. Determine patient's food preferences and provide high-protein, high-caloric foods as appropriate.     INTERVENTIONS:  - Monitor oral intake, urinary output, labs, and treatment plans  - Assess nutrition and hydration status and recommend course of action  - Evaluate amount of meals eaten  - Assist patient with eating if necessary   - Allow adequate time for meals  - Recommend/ encourage appropriate diets, oral nutritional supplements, and vitamin/mineral supplements  - Order, calculate, and assess calorie counts as needed  - Recommend, monitor, and adjust tube feedings and TPN/PPN based on assessed needs  - Assess need for intravenous fluids  - Provide specific nutrition/hydration education as appropriate  - Include patient/family/caregiver in decisions related to nutrition  Outcome: Not Progressing   Patient has transitioned to level 4 comfort care measures, remains on regular diet for pleasure feeds. No further nutrition intervention required at this time.

## 2024-11-19 NOTE — RESPIRATORY THERAPY NOTE
11/19/24 1430   Respiratory Assessment   Resp Comments patient terminally extubated to room air   Vent Information   Ventilator End Yes   SpO2 (!) 89 %

## 2024-11-19 NOTE — RESPIRATORY THERAPY NOTE
RT Ventilator Management Note  Duncan Pearce 59 y.o. male MRN: [id number]  Unit/Bed#: ICU 11 Encounter: [id number]      Daily Screen         11/18/2024 2012 11/19/2024  0122          Patient safety screen outcome:: Failed Failed      Not Ready for Weaning due to:: Underline problem not resolved Underline problem not resolved                Physical Exam:   Assessment Type: Assess only  General Appearance: Sedated  Respiratory Pattern: Assisted  Chest Assessment: Chest expansion symmetrical  Bilateral Breath Sounds: Coarse  Suction: ET Tube  O2 Device: vent      Resp Comments: no changes made to the patient's vent settings     11/19/24 0122   Respiratory Assessment   Assessment Type Assess only   General Appearance Sedated   Respiratory Pattern Assisted   Chest Assessment Chest expansion symmetrical   Bilateral Breath Sounds Coarse   Resp Comments no changes made to the patient's vent settings   O2 Device vent   Vent Information   Vent ID [id number]   Vent type Bella C3   Bella Vent Mode APVcmv   $ Pulse Oximetry Spot Check Charge Completed   APVcmv Settings   Resp Rate (BPM) 16 BPM   VT (mL) 450 mL   %TI (%) 1 %   Insp Time (sec) 0.74 sec   FIO2 (%) 60 %   PEEP (cmH2O) 8 cmH2O   I:E Ratio 1:2.1   P-ramp (ms) 100 (ms)   Flow Trigger (LPM) 2 LPM   Humidification Heater   Heater Temp 98.6 °F (37 °C)   APVcmv Actuals   Resp Rate (BPM) 30 BPM   VT (mL) 528 mL   MV (Obs) 15.5   MAP (cmH2O) 11 cmH2O   Peak Pressure (cmH2O) 17 cmH2O   I:E Ratio (Obs) 1:1.8   Static Compliance (mL/cmH20) 74.4 mL/cmH2O   Plateau Pressure (cm H2O)   (unable to obtain reading)   Heater Temperature (Obs) 98.4 °F (36.9 °C)   APVcmv ALARMS   High Peak Pressure (cmH2O) 45 cmH2O   Low Peak Pressure (cmH2O) 5 cm H2O   High Resp Rate (BPM) 40 BPM   High MV (L/min) 18 L/min   Low MV (L/min) 5 L/min   High VT (mL) 900 mL   Low VT (mL) 200 mL   Apnea Time (s) 20 S   Maintenance   Alarm (pink) cable attached No   Resuscitation bag with peep valve at  bedside Yes   Water bag changed No   Circuit changed No   Daily Screen   Patient safety screen outcome: Failed   Not Ready for Weaning due to: Underline problem not resolved   IHI Ventilator Associated Pneumonia Bundle   Daily Assessment of Readiness to Extubate Yes   Head of Bed Elevated HOB 30   ETT  Hi-Lo;Cuffed 8 mm   Placement Date/Time: 11/16/24 (c) 0302   Type: Hi-Lo;Cuffed  Tube Size: 8 mm  Location: Oral  Insertion attempts: 2  Placement Verification: Chest x-ray;End tidal CO2   Secured at (cm) 24   Measured from Teeth   Secured Location Left   Repositioned Right to Left   Secured by Commercial tube irizarry   Site Condition Dry   Cuff Pressure (color) Green   HI-LO Suction  Continuous low suction   HI-LO Secretions Scant   HI-LO Intervention Patent

## 2024-11-19 NOTE — ASSESSMENT & PLAN NOTE
Alcoholic cirrhosis likely causing impaired gluconeogenesis.  Currently on D5 at 50ml/hr  Hypoglycemia protocol   Glucose checks frequently

## 2024-11-19 NOTE — ASSESSMENT & PLAN NOTE
Patient with elevated WBC at 19.14 with 23% bands.  Remains on Ceftriaxone for SBP ppx  Could consider diagnostic para  Will check procal   Consider additional imaging-CXR  Blood cultures 11/16 negative  Could consider broadening out antibiotics  Consider re-culturing

## 2024-11-19 NOTE — ASSESSMENT & PLAN NOTE
Presented with lactic of 25 in the setting of severe hemorrhagic shock  Did improve with lowest being 3  Doubtful will clear completely given his significant liver dysfunction  Would check daily

## 2024-11-19 NOTE — ASSESSMENT & PLAN NOTE
Patient required emergent intubation and mechanical ventilation due to hypoxia and need for airway protection.  Follow ABGs, CXR as needed.    Wean ventilator as tolerated.   Bronchodilators as needed.    VAP prophylaxis.  Mental status currently precludes SBT attempt  Currently not on any sedation

## 2024-11-19 NOTE — ASSESSMENT & PLAN NOTE
Ammonia level 449 on admission. Dropped to 186 on 11/18.  Lactulose enemas in attempt to drop ammonia levels  Lack of gastric conduit for enteral administration  When patient can tolerate PO, can start rifaximin

## 2024-11-19 NOTE — ASSESSMENT & PLAN NOTE
Likely multifactorial to include low flow secondary to shock, metabolic encephalopathy, alcoholic encephalopathy, elevated ammonia  Delirium precautions  Serial neuroexams  Monitor for signs of alcohol withdrawal

## 2024-11-19 NOTE — ASSESSMENT & PLAN NOTE
On ER presentation, hemoglobin 4.7 with severe hypotension requiring code Crimson to stabilize. Patient required second code Crimson as he decompensated a second time after admission to the ICU.  Emergent EGD revealed esophageal varices with active brisk bleeding-banded x6  Patient required multiple blood products including packed red blood cells, FFP, platelets, and cryoprecipitate  Repeat EGD 11/18 showed no active bleeding  Continue to trend hemoglobin

## 2024-11-19 NOTE — ASSESSMENT & PLAN NOTE
Presented with a hemoglobin of 4.7 g/dL and low.  Etiology was variceal bleeding.  Patient received FFP, cryoprecipitate and PRBC with current hemoglobin of 9.3 g/dL and improved

## 2024-11-19 NOTE — RESPIRATORY THERAPY NOTE
RT Ventilator Management Note  Duncan Pearce 59 y.o. male MRN: [id number]  Unit/Bed#: ICU 11 Encounter: [id number]      Daily Screen         11/18/2024  1520 11/18/2024 2012          Patient safety screen outcome:: Failed Failed      Not Ready for Weaning due to:: Underline problem not resolved Underline problem not resolved                Physical Exam:   Assessment Type: Assess only  General Appearance: Sedated  Respiratory Pattern: Assisted  Chest Assessment: Chest expansion symmetrical  Bilateral Breath Sounds: Coarse  Suction: ET Tube  O2 Device: vent      Resp Comments: pt remains intubated on full mechanical support, no changes were made to the patient's vent settings at this time.     11/18/24 2012   Respiratory Assessment   Assessment Type Assess only   General Appearance Sedated   Respiratory Pattern Assisted   Chest Assessment Chest expansion symmetrical   Bilateral Breath Sounds Coarse   Resp Comments pt remains intubated on full mechanical support, no changes were made to the patient's vent settings at this time.   O2 Device vent   Vent Information   Vent ID [id number]   Vent type Bella C3   Bella Vent Mode APVcmv   $ Pulse Oximetry Spot Check Charge Completed   APVcmv Settings   Resp Rate (BPM) 16 BPM   VT (mL) 450 mL   %TI (%) 1 %   Insp Time (sec) 0.74 sec   FIO2 (%) 60 %   PEEP (cmH2O) 8 cmH2O   P-ramp (ms) 100 (ms)   Flow Trigger (LPM) 2 LPM   Humidification Heater   Heater Temp 98.6 °F (37 °C)   APVcmv Actuals   Resp Rate (BPM) 29 BPM   VT (mL) 531 mL   MV (Obs) 16.1   MAP (cmH2O) 11 cmH2O   Peak Pressure (cmH2O) 18 cmH2O   I:E Ratio (Obs) 1:1.7   Static Compliance (mL/cmH20) 64.5 mL/cmH2O   Plateau Pressure (cm H2O)   (unable to obtain)   Heater Temperature (Obs) 98.6 °F (37 °C)   APVcmv ALARMS   High Peak Pressure (cmH2O) 45 cmH2O   Low Peak Pressure (cmH2O) 5 cm H2O   High Resp Rate (BPM) 40 BPM   High MV (L/min) 18 L/min   Low MV (L/min) 5 L/min   High VT (mL) 900 mL   Low VT (mL) 200 mL    Maintenance   Alarm (pink) cable attached No   Resuscitation bag with peep valve at bedside Yes   Water bag changed Yes   Circuit changed No   Daily Screen   Patient safety screen outcome: Failed   Not Ready for Weaning due to: Underline problem not resolved   IHI Ventilator Associated Pneumonia Bundle   Daily Assessment of Readiness to Extubate Yes   Head of Bed Elevated HOB 30   ETT  Hi-Lo;Cuffed 8 mm   Placement Date/Time: 11/16/24 (c) 5949   Type: Hi-Lo;Cuffed  Tube Size: 8 mm  Location: Oral  Insertion attempts: 2  Placement Verification: Chest x-ray;End tidal CO2   Secured at (cm) 24   Measured from Teeth   Secured Location Right   Repositioned Center to Right   Secured by Commercial tube irizarry   Site Condition Dry   Cuff Pressure (color) Green   HI-LO Suction  Continuous low suction   HI-LO Secretions Scant   HI-LO Intervention Patent

## 2024-11-19 NOTE — PROGRESS NOTES
Patient provided comfort medications and terminally extubated.   Family at bedside.  Patient resting comfortably.

## 2024-11-19 NOTE — ASSESSMENT & PLAN NOTE
Likely multifactorial, to include low flow state, hepatorenal syndrome, hypovolemia. UOP poor despite Bumex gtt.   Avoid nephrotoxics and hypotension  Continue to monitor renal indices and urinary output  Nephrology consulted, initial plan was for CRRT, however upon further discussion with patient's sisters they determined he would not want such aggressive measures  Continue bumex drip  Ongoing GOC discussions

## 2024-11-19 NOTE — PROGRESS NOTES
Progress Note - Gastroenterology   Name: Duncan Pearce 59 y.o. male I MRN: [id number]  Unit/Bed#: ICU 11 I Date of Admission: 11/16/2024   Date of Service: 11/19/2024 I Hospital Day: 3    Assessment & Plan  Hemorrhagic shock (HCC)    ETOH abuse    ABLA (acute blood loss anemia)    Acute respiratory failure (HCC)    Alcoholic cirrhosis (HCC)    GLORY (acute kidney injury) (HCC)    Transaminitis    Thrombocytopenia (HCC)    Hyperammonemia (HCC)    Toxic metabolic encephalopathy    GI bleed    Hypoglycemia    Coagulopathy (HCC)    Leukocytosis    Lactic acidosis  MELD 3.0: 39 at 11/19/2024 11:00 AM  MELD-Na: 42 at 11/19/2024 11:00 AM  Calculated from:  Serum Creatinine: 6.58 mg/dL (Using max of 3 mg/dL) at 11/19/2024 11:00 AM  Serum Sodium: 147 mmol/L (Using max of 137 mmol/L) at 11/19/2024 11:00 AM  Total Bilirubin: 10.31 mg/dL at 11/19/2024 11:00 AM  Serum Albumin: 3 g/dL at 11/19/2024 11:00 AM  INR(ratio): 3.24 at 11/18/2024  5:00 AM  Age at listing (hypothetical): 59 years  Sex: Male at 11/19/2024 11:00 AM    -Patient has a hx of decompensated alcohol cirrhosis complicated by HE, ascites who was admitted with hemorrhagic shock with hypotension and HGB of 4.7 on presentation.  -Code Crimson was initiated in the ED yesterday upon presentation.  -He was also found to have significantly elevated LFTs c/w ischemic hepatitis and a severe lactic acidosis.  -Emergent EGD was performed yesterday morning. EGD showed 4 columns of grade 3 esophageal varices with active bleeding s/p banding x 6 and a large amount of blood in the stomach.  -11/18/2024 EGD shows multiple large varices noted in the esophagus with 4 intact bands.  -HGB is stable at 10.3 this am. Lactic acid is trending down from 25.2 to 3.  -He is currently intubated/sedated in the ICU. He is being weaned off vasopressors.  -Continue Octreotide gtt.  -Pantoprazole 40mg IV BID  -Ceftriaxone for SBP prophylaxis.  -Lactulose enemas.  -Monitor patient and labs. ICU  care.  -Patient's girlfriend and sister updated at the bedside of patient's very poor prognosis.  Patient is not a transplant candidate given significant alcohol abuse and psychiatric illness.  Recommend goals of care discussion.    I have discussed the above management plan in detail with the primary service.   Gastroenterology service will follow.    Subjective   Patient remains intubated off sedation.  Patient's girlfriend and sister updated at the bedside.  EGD from yesterday reviewed.    Objective :  Temp:  [99.3 °F (37.4 °C)-100.9 °F (38.3 °C)] 100 °F (37.8 °C)  HR:  [103-121] 103  BP: ()/(33-68) 119/60  Resp:  [25-33] 26  SpO2:  [90 %-95 %] 92 %  O2 Device: Ventilator        Lab Results: I have reviewed the following results:CBC/BMP:   .     11/19/24  0525 11/19/24  1100   WBC 19.34*  --    HGB 9.3*  --    HCT 30.3*  --    PLT 54*  --    SODIUM 146 147   K 3.6 3.6    108   CO2 21 20*   BUN 66* 69*   CREATININE 6.14* 6.58*   GLUC 74 81   CAIONIZED 1.09* 1.08*   MG 2.4 2.4   PHOS 3.5 4.0    , LFTs:   .     11/19/24  1100   AST 1,168*   *   ALB 3.0*   TBILI 10.31*   ALKPHOS 141*    , PTT/INR:No new results in last 24 hours.     Imaging Results Review: No pertinent imaging studies reviewed.  Other Study Results Review: No additional pertinent studies reviewed.

## 2024-11-19 NOTE — ASSESSMENT & PLAN NOTE
Likely related to liver disease  Platelet transfusions as needed as part of balance transfusion  Trend with CBC

## 2024-11-19 NOTE — ASSESSMENT & PLAN NOTE
Hypotension with Hgb 4.7 on presentation with distended abd and altered mental status. Code crimson initiated by emergency room, and again in the ICU.   PRBC-6, FFP-3, platelets -2 packs, cryoprecipitate.  Vitamin K 10 mg IV, PCC administered.   EGD revealed source to be briskly bleeding esophageal varices s/p banding  Hgb has remained stable  Remains on norepi, but hemorrhagic shock resolved

## 2024-11-19 NOTE — DISCHARGE SUMMARY
Admission Date: 11/16/2024   Admitting Diagnosis: Hepatic encephalopathy (HCC) [K76.82]  Portal hypertension (HCC) [K76.6]  Hemorrhagic shock (HCC) [R57.8]  Lactic acidosis [E87.20]  Hyperbilirubinemia [E80.6]  Coagulopathy (HCC) [D68.9]  Leukocytosis [D72.829]  Altered mental status [R41.82]  Metabolic acidosis [E87.20]  Hypoglycemia [E16.2]  Thrombocytopenia (HCC) [D69.6]  Upper GI bleed [K92.2]  Transaminitis [R74.01]  Bandemia [D72.825]  GLORY (acute kidney injury) (HCC) [N17.9]  Alcoholic cirrhosis of liver with ascites (HCC) [K70.31]  Acute encephalopathy [G93.40]  Hypothermia, initial encounter [T68.XXXA]  Discharge Date: 11/19/24    HPI: Per CC Ap 11/16: Duncan Pearce is a 59 y.o. with past medical history significant for alcohol dependence, alcoholic liver disease, spontaneous bacterial peritonitis who presents to the emergency room after EMS was summoned by his girlfriend due to confusion during a telephone conversation.  Patient was found on the floor, minimally responsive. Of note patient was also hypoglycemic and was administered glucagon 1 mg.  Evaluation in the emergency room was negative for traumatic injury, however patient was noted to be significantly acidotic at 6.93, anemic with a hemoglobin of 4.7, a lactic acid level of 25, and an ammonia level of 449.  On exam his abdomen is distended and firm with absent bowel sounds.  Patient is obtunded on initial exam, hypotensive, and mottled.  Patient's blood pressure continued to fall until femoral pulse was no longer palpable.  Epinephrine 1 mg IV push was given. Code Crimson was called and transfusions were initiated.  Blood pressure improved with restoration of volume, patient became more alert.  Emergency room provider spoke with gastroenterology, with plan to urgently scope after adequate resuscitation.  Patient was started on sodium bicarbonate drip and octreotide drip.  Protonix bolus dose given.  Ceftriaxone for SBP prophylaxis, and Flagyl for  possible aspiration.  Critical care was consulted for admission due to hemorrhagic shock with likely GI bleed as the source.     Procedures Performed:   Orders Placed This Encounter   Procedures    Central Line    Central Line    Critical Care    Intubation       Summary of Hospital Course: Underwent emergent EGD and received banding x 6 for esophageal varices and remained intubated and on vasopressors.  Continued to have a lactic acidosis.  Remained on octreotide and Protonix drip, but remained coagulopathic.  he received multiple blood transfusions for ongoing coagulopathy and anemia.  He remained off sedation and had abnormal neurological response outside of brainstem reflexes, despite improved ammonia levels.  Neurology consulted for worsening renal function and minimal urine output.  Family did not want to move forward with dialysis and wished to transition to [facility]      Significant Findings, Care, Treatment and Services Provided:     Complications: Renal failure, Lactic acidosis, Cooagulopathy    Disposition:      Final Diagnosis: acute live failure    Medical Problems       Resolved Problems  Date Reviewed: 2024          Resolved    High anion gap metabolic acidosis 2024     Resolved by  IRAJ Montes            Date, Time and Cause of Death    Date of Death: 24  Time of Death:  4:54 PM  Preliminary Cause of Death: Acute liver failure  Entered by: Rosaura GALO[JS1.1]       Attribution       JS1.1 IRAJ Youngblood 24 17:11            Death Note:  INPATIENT DEATH NOTE  Duncan Pearce 59 y.o. male MRN: [id number]  Unit/Bed#: ICU 11 Encounter: [id number]    Date, Time and Cause of Death    Date of Death: 24  Time of Death:  4:54 PM  Preliminary Cause of Death: Acute liver failure  Entered by: Rosaura GALO[JS1.1]       Attribution       JS1.1 IRAJ Youngblood 24 17:11             Patient's Information  Pronounced  by: Rosaura GALO  Did the patient's death occur in the ED?: No  Did the patient's death occur in the OR?: No  Did the patient's death occur less than 10 days post-op?: No  Did the patient's death occur within 24 hours of admission?: No  Was code status DNR at the time of death?: Yes    PHYSICAL EXAM:  Unresponsive to noxious stimuli, Spontaneous respirations absent, Breath sounds absent, Carotid pulse absent, Heart sounds absent, Pupillary light reflex absent, and Corneal blink reflex absent    Medical Examiner notification criteria:  N/a   Medical Examiner's office notified?:  Yes   Medical Examiner accepted case?:  No  Name of Medical Examiner: George AGUIAR    Family Notification  Was the family notified?: Yes  Date Notified: 24  Time Notified: 170  Notified by: Rosaura GALO  Name of Family Notified of Death: Sister Licha   Relationship to Patient: Sister  Family Notification Route: Telephone  Was the family told to contact a  home?: Yes  Name of  Home:: TBD    Autopsy Options:  Post-mortem examination declined by next of kin    Primary Service Attending Physician notified?:  yes - Attending:  Anthony Garcia MD    Physician/Resident responsible for completing Discharge Summary:  Rosaura GALO

## 2024-11-19 NOTE — PLAN OF CARE
Problem: SAFETY,RESTRAINT: NV/NON-SELF DESTRUCTIVE BEHAVIOR  Goal: Remains free of harm/injury (restraint for non violent/non self-detsructive behavior)  Description: INTERVENTIONS:  - Instruct patient/family regarding restraint use   - Assess and monitor physiologic and psychological status   - Provide interventions and comfort measures to meet assessed patient needs   - Identify and implement measures to help patient regain control  - Assess readiness for release of restraint   Outcome: Progressing  Goal: Returns to optimal restraint-free functioning  Description: INTERVENTIONS:  - Assess the patient's behavior and symptoms that indicate continued need for restraint  - Identify and implement measures to help patient regain control  - Assess readiness for release of restraint   Outcome: Progressing     Problem: Nutrition/Hydration-ADULT  Goal: Nutrient/Hydration intake appropriate for improving, restoring or maintaining nutritional needs  Description: Monitor and assess patient's nutrition/hydration status for malnutrition. Collaborate with interdisciplinary team and initiate plan and interventions as ordered.  Monitor patient's weight and dietary intake as ordered or per policy. Utilize nutrition screening tool and intervene as necessary. Determine patient's food preferences and provide high-protein, high-caloric foods as appropriate.     INTERVENTIONS:  - Monitor oral intake, urinary output, labs, and treatment plans  - Assess nutrition and hydration status and recommend course of action  - Evaluate amount of meals eaten  - Assist patient with eating if necessary   - Allow adequate time for meals  - Recommend/ encourage appropriate diets, oral nutritional supplements, and vitamin/mineral supplements  - Order, calculate, and assess calorie counts as needed  - Recommend, monitor, and adjust tube feedings and TPN/PPN based on assessed needs  - Assess need for intravenous fluids  - Provide specific  nutrition/hydration education as appropriate  - Include patient/family/caregiver in decisions related to nutrition  Outcome: Progressing     Problem: Knowledge Deficit  Goal: Patient/family/caregiver demonstrates understanding of disease process, treatment plan, medications, and discharge instructions  Description: Complete learning assessment and assess knowledge base.  Interventions:  - Provide teaching at level of understanding  - Provide teaching via preferred learning methods  Outcome: Progressing

## 2024-11-19 NOTE — ASSESSMENT & PLAN NOTE
MELD 3.0: 39 at 11/19/2024 11:00 AM  MELD-Na: 42 at 11/19/2024 11:00 AM  Calculated from:  Serum Creatinine: 6.58 mg/dL (Using max of 3 mg/dL) at 11/19/2024 11:00 AM  Serum Sodium: 147 mmol/L (Using max of 137 mmol/L) at 11/19/2024 11:00 AM  Total Bilirubin: 10.31 mg/dL at 11/19/2024 11:00 AM  Serum Albumin: 3 g/dL at 11/19/2024 11:00 AM  INR(ratio): 3.24 at 11/18/2024  5:00 AM  Age at listing (hypothetical): 59 years  Sex: Male at 11/19/2024 11:00 AM    -Patient has a hx of decompensated alcohol cirrhosis complicated by HE, ascites who was admitted with hemorrhagic shock with hypotension and HGB of 4.7 on presentation.  -Code Crimson was initiated in the ED yesterday upon presentation.  -He was also found to have significantly elevated LFTs c/w ischemic hepatitis and a severe lactic acidosis.  -Emergent EGD was performed yesterday morning. EGD showed 4 columns of grade 3 esophageal varices with active bleeding s/p banding x 6 and a large amount of blood in the stomach.  -11/18/2024 EGD shows multiple large varices noted in the esophagus with 4 intact bands.  -HGB is stable at 10.3 this am. Lactic acid is trending down from 25.2 to 3.  -He is currently intubated/sedated in the ICU. He is being weaned off vasopressors.  -Continue Octreotide gtt.  -Pantoprazole 40mg IV BID  -Ceftriaxone for SBP prophylaxis.  -Lactulose enemas.  -Monitor patient and labs. ICU care.  -Patient's girlfriend and sister updated at the bedside of patient's very poor prognosis.  Patient is not a transplant candidate given significant alcohol abuse and psychiatric illness.  Recommend goals of care discussion.

## 2024-11-19 NOTE — PROGRESS NOTES
Progress Note - Nephrology   Name: Duncan Pearce 59 y.o. male I MRN: [id number]  Unit/Bed#: ICU 11 I Date of Admission: 11/16/2024   Date of Service: 11/19/2024 I Hospital Day: 3     Assessment & Plan  GLORY (acute kidney injury) (HCC)  Presented to our facility with a creatinine of 1.7 mg/dL with previously having normal renal function  Noted progressive decline renal function with development of oligoanuric GLORY and creatinine peaking up to 6.1 mg/dL today.  Family refused consideration of renal replacement therapy given poor prognosis.  Hemorrhagic shock (HCC)  Presented with a hemoglobin of 4.7 g/dL and low.  Etiology was variceal bleeding.  Patient received FFP, cryoprecipitate and PRBC with current hemoglobin of 9.3 g/dL and improved  ETOH abuse  Patient with known history of alcohol abuse.  Likely cause of decompensated liver cirrhosis.  Acute respiratory failure (HCC)  Patient required mechanical ventilation.      Subjective   Brief History of Admission -59 years old male with past medical history of alcoholic liver cirrhosis 59 years old male with past medical history of alcoholic liver cirrhosis, spontaneous bacterial peritonitis, EtOH abuse who presented to our facility after found on floor by EMS.    Patient remains intubated and sedated.  Continues to have Bumex gtt. as well as octreotide gtt. in place.  Underwent repeat EGD did not reveal any profuse bleeding. Events of the pat 24 hours noted including family's decision to not proceed with dialysis.     Objective :  Temp:  [99.3 °F (37.4 °C)-100.9 °F (38.3 °C)] 100.4 °F (38 °C)  HR:  [109-121] 117  BP: ()/(33-68) 141/68  Resp:  [27-33] 28  SpO2:  [90 %-95 %] 92 %  O2 Device: Ventilator    Current Weight: Weight - Scale: 93.5 kg (206 lb 2.1 oz)  First Weight: Weight - Scale: 83.4 kg (183 lb 13.8 oz)  I/O         11/17 0701  11/18 0700 11/18 0701 11/19 0700 11/19 0701 11/20 0700    P.O. 0      I.V. (mL/kg) 867.2 (9.4) 2165.7 (23.2)     Blood   637.4     IV Piggyback 433.3 300     Total Intake(mL/kg) 1300.5 (14.1) 3103 (33.2)     Urine (mL/kg/hr) 107 (0) 56 (0)     Stool 1900 2500     Total Output 2007 2556     Net -706.5 +547                ROS:  Patient is intubated        Physical Exam  Constitutional:       Comments: Patient remains intubated and sedated   HENT:      Head: Normocephalic and atraumatic.   Eyes:      Pupils: Pupils are equal, round, and reactive to light.   Neck:      Vascular: No JVD.   Cardiovascular:      Rate and Rhythm: Normal rate and regular rhythm.      Heart sounds: Normal heart sounds. No murmur heard.     No friction rub.   Pulmonary:      Breath sounds: Rhonchi present.   Abdominal:      General: Bowel sounds are normal. There is no distension.      Palpations: Abdomen is soft.      Tenderness: There is no abdominal tenderness. There is no rebound.   Musculoskeletal:         General: No tenderness.      Cervical back: Neck supple.      Right lower leg: Edema present.      Left lower leg: Edema present.   Skin:     General: Skin is dry.      Findings: No rash.   Neurological:      Comments: Patient is intubated         Medications:    Current Facility-Administered Medications:     albumin human (FLEXBUMIN) 25 % injection 12.5 g, 12.5 g, Intravenous, Q6H, IRJA Goff, 12.5 g at 11/19/24 0519    bumetanide (BUMEX) 12.5 mg infusion 50 mL, 1 mg/hr, Intravenous, Continuous, IRAJ Wagner, Last Rate: 4 mL/hr at 11/19/24 0120, 1 mg/hr at 11/19/24 0120    ceftriaxone (ROCEPHIN) 1 g/50 mL in dextrose IVPB, 1,000 mg, Intravenous, Q12H, IRAJ Montes, Last Rate: 100 mL/hr at 11/19/24 0115, 1,000 mg at 11/19/24 0115    chlorhexidine (PERIDEX) 0.12 % oral rinse 15 mL, 15 mL, Mouth/Throat, Q12H Counts include [facility] beds at the [facility], IRAJ Montes, 15 mL at 11/19/24 0829    dextrose 5 % and sodium chloride 0.9 % infusion, 50 mL/hr, Intravenous, Continuous, IRAJ Goff, Last Rate: 50 mL/hr at 11/19/24 0590, 29  mL/hr at 11/19/24 0556    folic acid 1 mg, thiamine (VITAMIN B1) 100 mg in sodium chloride 0.9 % 100 mL IV piggyback, , Intravenous, Daily, IRAJ Montes, Last Rate: 200 mL/hr at 11/19/24 0845, New Bag at 11/19/24 0845    influenza vaccine, recombinant (PF) (Flublok) IM injection 0.5 mL, 0.5 mL, Intramuscular, Prior to discharge, Hellen Emmanuel MD    lactulose retention enema 200 g, 200 g, Rectal, Q6H, IRAJ Ching, 200 g at 11/19/24 0519    midazolam (VERSED) injection 2 mg, 2 mg, Intravenous, Q1H PRN, Hellen Emmanuel MD, 2 mg at 11/16/24 2014    NOREPINEPHRINE 4 MG  ML NSS (CMPD ORDER) infusion, 1-30 mcg/min, Intravenous, Titrated, IRAJ Ching, Last Rate: 37.5 mL/hr at 11/19/24 0716, 10 mcg/min at 11/19/24 0716    octreotide (SandoSTATIN) 500 mcg in sodium chloride 0.9 % 250 mL infusion, 50 mcg/hr, Intravenous, Continuous, Chuyita Galvan MD, Last Rate: 25 mL/hr at 11/19/24 0234, 50 mcg/hr at 11/19/24 0234    pantoprazole (PROTONIX) injection 40 mg, 40 mg, Intravenous, Q12H STACI, IRAJ Ching, 40 mg at 11/19/24 0829    pneumococcal 20-hiwot conj vacc (PREVNAR 20) IM Injection 0.5 mL, 0.5 mL, Intramuscular, Prior to discharge, Hellen Emmanuel MD      Lab Results: I have reviewed the following results:  Results from last 7 days   Lab Units 11/19/24  0525 11/18/24  2207 11/18/24  1214 11/18/24  0827 11/18/24  0823 11/18/24  0500 11/18/24  0016 11/17/24  1523 11/17/24  0509 11/17/24  0013 11/16/24  1808 11/16/24  1219 11/16/24  0917 11/16/24  0538   WBC Thousand/uL 19.34*  --   --  19.14*  --  18.72*  --   --  15.72* 17.09* 18.53* 21.11* 22.06* 24.31*   HEMOGLOBIN g/dL 9.3*  --   --  10.0*  --  10.0*  --   --  10.3* 10.3* 9.9* 9.9* 10.3* 6.8*   HEMATOCRIT % 30.3*  --   --  31.9*  --  31.6*  --   --  31.4* 31.5* 30.5* 31.7* 34.0* 24.0*   PLATELETS Thousands/uL 54*  --   --  55*  --  51*  --   --  66* 67* 73* 92* 78* 63*   POTASSIUM mmol/L 3.6 3.6 3.6  --   "3.6 3.8 3.7 3.6 3.6 3.8 4.0 4.7 5.6* 5.8*   CHLORIDE mmol/L 107 106 106  --  105 104 103 103 101 100 98 99 98 96   CO2 mmol/L 21 22 22  --  23 24 25 30 32 34* 29 25 18* 10*   BUN mg/dL 66* 58* 58*  --  56* 54* 51* 44* 37* 34* 31* 29* 28* 30*   CREATININE mg/dL 6.14* 5.65* 5.01*  --  4.74* 4.55* 4.16* 3.49* 2.77* 2.54* 2.25* 1.90* 1.74* 1.79*   CALCIUM mg/dL 8.8 9.2 8.8  --  8.8 9.1 8.9 8.9 9.3 9.0 8.4 8.5 8.6 7.3*   MAGNESIUM mg/dL 2.4 2.4 2.4  --  2.4  --  2.0 2.1 1.5*  --   --   --   --  1.6*   PHOSPHORUS mg/dL 3.5 3.4 3.5  --   --  3.0  --   --  2.4  --   --   --   --  8.7*   ALBUMIN g/dL 2.9*  --   --   --   --  3.0*  --   --  3.4* 3.3* 3.2* 3.6 3.2* 2.3*       Administrative Statements     Portions of the record may have been created with voice recognition software. Occasional wrong word or \"sound a like\" substitutions may have occurred due to the inherent limitations of voice recognition software. Read the chart carefully and recognize, using context, where substitutions have occurred.If you have any questions, please contact the dictating provider.  "

## 2024-11-19 NOTE — ASSESSMENT & PLAN NOTE
Presented to our facility with a creatinine of 1.7 mg/dL with previously having normal renal function  Noted progressive decline renal function with development of oligoanuric GLORY and creatinine peaking up to 6.1 mg/dL today.  Family refused consideration of renal replacement therapy given poor prognosis.   6 (moderate pain)

## 2024-11-19 NOTE — PROGRESS NOTES
Pastoral Care Progress Note             11/19/24 1500   Clinical Encounter Type   Visited With Patient and family together   Routine Visit Introduction   Crisis Visit   (status change to comfort care per nurse referral)   Referral From Nurse   Referral To      Nurse referral for family support for pt status change to comfort care.  provided final prayer to pt and support to sisters and brother in law at bedside.  cultivated a relationship of care and support and engaged in theological discussion with family. Family expressed gratitude for the visit.  will follow up as needed or requested for family support.

## 2024-11-19 NOTE — PROGRESS NOTES
Progress Note - Critical Care/ICU   Name: Duncan Pearce 59 y.o. male I MRN: [id number]  Unit/Bed#: ICU 11 I Date of Admission: 11/16/2024   Date of Service: 11/19/2024 I Hospital Day: 3      Assessment & Plan  Hemorrhagic shock (HCC)  Hypotension with Hgb 4.7 on presentation with distended abd and altered mental status. Code crimson initiated by emergency room, and again in the ICU.   PRBC-6, FFP-3, platelets -2 packs, cryoprecipitate.  Vitamin K 10 mg IV, PCC administered.   EGD revealed source to be briskly bleeding esophageal varices s/p banding  Hgb has remained stable  Remains on norepi, but hemorrhagic shock resolved  ABLA (acute blood loss anemia)  On ER presentation, hemoglobin 4.7 with severe hypotension requiring code Crimson to stabilize. Patient required second code Crimson as he decompensated a second time after admission to the ICU.  Emergent EGD revealed esophageal varices with active brisk bleeding-banded x6  Patient required multiple blood products including packed red blood cells, FFP, platelets, and cryoprecipitate  Repeat EGD 11/18 showed no active bleeding  Continue to trend hemoglobin    GLORY (acute kidney injury) (HCC)  Likely multifactorial, to include low flow state, hepatorenal syndrome, hypovolemia. UOP poor despite Bumex gtt.   Avoid nephrotoxics and hypotension  Continue to monitor renal indices and urinary output  Nephrology consulted, initial plan was for CRRT, however upon further discussion with patient's sisters they determined he would not want such aggressive measures  Continue bumex drip  Ongoing GOC discussions    Thrombocytopenia (HCC)  Likely related to liver disease  Platelet transfusions as needed as part of balance transfusion  Trend with CBC  Hyperammonemia (HCC)  Ammonia level 449 on admission. Dropped to 186 on 11/18.  Lactulose enemas in attempt to drop ammonia levels  Lack of gastric conduit for enteral administration  When patient can tolerate PO, can start  rifaximin  Toxic metabolic encephalopathy  Likely multifactorial to include low flow secondary to shock, metabolic encephalopathy, alcoholic encephalopathy, elevated ammonia  Delirium precautions  Serial neuroexams  Monitor for signs of alcohol withdrawal  GI bleed  Secondary to actively bleeding esophageal varices  EGD on 11/18  Gastroenterology following  Continue octreotide and Protonix  Monitor hemoglobins and transfuse as needed    Acute respiratory failure (HCC)  Patient required emergent intubation and mechanical ventilation due to hypoxia and need for airway protection.  Follow ABGs, CXR as needed.    Wean ventilator as tolerated.   Bronchodilators as needed.    VAP prophylaxis.  Mental status currently precludes SBT attempt  Currently not on any sedation    ETOH abuse  Continue thiamine, folic acid, multivitamin intravenously daily.  Monitor closely for signs of withdrawal  CIWA protocol when appropriate  Alcoholic cirrhosis (HCC)  Patient with long history of alcoholic cirrhosis, portal hypertension, and multiple varices.  Admission MELD score 29. 11/18 MELD-Na is 41  Resultant thrombocytopenia, transaminitis, and hypoglycemia.  Ceftriaxone for SBP prophylaxis. Day #4/7, end on 11/22  Gastroenterology following, appreciate their recommendations  Continue octreotide drip, protonix BID  Continue lactulose enemas, start rifaximin when able to take po  Transaminitis  Related to alcoholic cirrhosis and likely exacerbated by hypotension. AST, ALT, and Alk Phos maximums were 7596, 1171, and 304, respectively.  Decreased to 2410, 704, 222 on 11/18.  Continue to trend hepatic functions  Hypoglycemia  Alcoholic cirrhosis likely causing impaired gluconeogenesis.  Currently on D5 at 50ml/hr  Hypoglycemia protocol   Glucose checks frequently  Leukocytosis  Patient with elevated WBC at 19.14 with 23% bands.  Remains on Ceftriaxone for SBP ppx  Could consider diagnostic para  Will check procal   Consider additional  imaging-CXR  Blood cultures 11/16 negative  Could consider broadening out antibiotics  Consider re-culturing  Lactic acidosis  Presented with lactic of 25 in the setting of severe hemorrhagic shock  Did improve with lowest being 3  Doubtful will clear completely given his significant liver dysfunction  Would check daily  Disposition: Critical care    ICU Core Measures     Vented Patient  VAP Bundle  VAP bundle ordered     A: Assess, Prevent, and Manage Pain Has pain been assessed? Yes  Need for changes to pain regimen? No   B: Both Spontaneous Awakening Trials (SATs) and Spontaneous Breathing Trials (SBTs) Plan to perform spontaneous awakening trial today? Yes   Plan to perform spontaneous breathing trial today? Yes   Obvious barriers to extubation? Yes   C: Choice of Sedation RASS Goal: N/A patient not on sedation  Need for changes to sedation or analgesia regimen? No   D: Delirium CAM-ICU: Unable to perform secondary to Acute cognitive dysfunction   E: Early Mobility  Plan for early mobility? Yes   F: Family Engagement Plan for family engagement today? Yes       Antibiotic Review: SBP ppx    Review of Invasive Devices:    Bernal Plan: Continue for accurate I/O monitoring for 48 hours  Central access plan: Medications requiring central line Hemodynamic monitoring      Prophylaxis:  VTE VTE covered by:    None    Contraindicated secondary to: GI Bleed   Stress Ulcer  covered bypantoprazole (PROTONIX) injection 40 mg [[id number]]         24 Hour Events : Worsening renal function and urine output despite bumex drip. Initial plan to start CRRT, however further discussions had with sisters who agreed that patient would not want such aggressive measures. Plan for ongoing GOC discussions today. Repeat EGD showed no bleeding.   Subjective   Review of Systems: Review of Systems not obtainable due to Clinical Condition, Altered mental status    Objective :                   Vitals I/O      Most Recent Min/Max in 24hrs   Temp  (!) 100.6 °F (38.1 °C) Temp  Min: 99.3 °F (37.4 °C)  Max: 100.9 °F (38.3 °C)   Pulse (!) 118 Pulse  Min: 109  Max: 121   Resp (!) 30 Resp  Min: 17  Max: 33   BP 93/50 BP  Min: 72/38  Max: 121/60   O2 Sat 93 % SpO2  Min: 92 %  Max: 96 %      Intake/Output Summary (Last 24 hours) at 2024 0238  Last data filed at 2024 0200  Gross per 24 hour   Intake 3060.08 ml   Output 1363 ml   Net 1697.08 ml       Diet NPO    Invasive Monitoring           Physical Exam   Physical Exam  Eyes:      Pupils: Pupils are equal, round, and reactive to light.   Skin:     General: Skin is warm.      Coloration: Skin is jaundiced and pale.   HENT:      Head: Normocephalic and atraumatic.      Mouth/Throat:      Mouth: Mucous membranes are dry.   Neck:      Vascular: Central line present.   Cardiovascular:      Rate and Rhythm: Regular rhythm. Tachycardia present.      Heart sounds: Normal heart sounds.   Musculoskeletal:      Right lower le+ Edema present.      Left lower le+ Edema present.   Abdominal: General: There is distension.     Palpations: Abdomen is soft.   Constitutional:       Appearance: He is ill-appearing and toxic-appearing.      Interventions: He is intubated.   Pulmonary:      Effort: No respiratory distress. He is intubated.      Breath sounds: Rhonchi present.   Neurological:      Mental Status: He is unresponsive.   Genitourinary/Anorectal:  Bernal present.        Diagnostic Studies        Lab Results: I have reviewed the following results:     Medications:  Scheduled PRN   Albumin 25%, 12.5 g, Q6H  cefTRIAXone, 1,000 mg, Q12H  chlorhexidine, 15 mL, Q12H STACI  folic acid 1 mg, thiamine (VITAMIN B1) 100 mg in sodium chloride 0.9 % 100 mL IV piggyback, , Daily  lactulose, 200 g, Q6H  pantoprazole, 40 mg, Q12H STACI      influenza vaccine, 0.5 mL, Prior to discharge  midazolam, 2 mg, Q1H PRN  pneumococcal 20-hiwot conj vacc, 0.5 mL, Prior to discharge       Continuous    bumetanide (BUMEX) 12.5 mg infusion 50  mL, 1 mg/hr, Last Rate: 1 mg/hr (11/19/24 0120)  dextrose 5 % and sodium chloride 0.9 %, 50 mL/hr, Last Rate: 50 mL/hr (11/18/24 1339)  norepinephrine, 1-30 mcg/min, Last Rate: 7 mcg/min (11/19/24 0117)  octreotide, 50 mcg/hr, Last Rate: 50 mcg/hr (11/19/24 0234)         Labs:   CBC    Recent Labs     11/18/24  0500 11/18/24  0827   WBC 18.72* 19.14*   HGB 10.0* 10.0*   HCT 31.6* 31.9*   PLT 51* 55*   BANDSPCT  --  23*     BMP    Recent Labs     11/18/24  1214 11/18/24  2207   SODIUM 145 145   K 3.6 3.6    106   CO2 22 22   AGAP 17* 17*   BUN 58* 58*   CREATININE 5.01* 5.65*   CALCIUM 8.8 9.2       Coags    Recent Labs     11/17/24  0509 11/18/24  0500   INR 2.61* 3.24*        Additional Electrolytes  Recent Labs     11/18/24  1214 11/18/24  2207   MG 2.4 2.4   PHOS 3.5 3.4   CAIONIZED 1.08* 1.11*          Blood Gas    Recent Labs     11/17/24  0510   PHART 7.501*   [id number] 34.6*   PO2ART 93.5   [id number] 26.4   BEART 3.4   SOURCE Line, Arterial     Recent Labs     11/17/24  0510   SOURCE Line, Arterial    LFTs  Recent Labs     11/17/24  0509 11/18/24  0500   ALT 1,049* 704*   AST 6,014* 2,410*   ALKPHOS 268* 222*   ALB 3.4* 3.0*   TBILI 6.47* 7.85*       Infectious  Recent Labs     11/18/24  0823   PROCALCITONI 2.98*     Glucose  Recent Labs     11/18/24  0500 11/18/24  0823 11/18/24  1214 11/18/24  2207   GLUC 72 51* 33* 79

## 2024-11-19 NOTE — RESPIRATORY THERAPY NOTE
11/19/24 1053   Respiratory Assessment   Assessment Type Assess only   General Appearance Sedated   Respiratory Pattern Assisted   Chest Assessment Chest expansion symmetrical   Resp Comments no changes at this time, remains on full support   O2 Device vent   Vent Information   Bella Vent Mode APVcmv   $ Pulse Oximetry Spot Check Charge Completed   SpO2 92 %   APVcmv Settings   Resp Rate (BPM) 16 BPM   VT (mL) 450 mL   %TI (%) 1 %   Insp Time (sec) 0.74 sec   FIO2 (%) 50 %   PEEP (cmH2O) 8 cmH2O   Insp Resistance 1   P-ramp (ms) 100 (ms)   Flow Trigger (LPM) 2 LPM   Humidification Heater   Heater Temp 98.6 °F (37 °C)   APVcmv Actuals   Resp Rate (BPM) 26 BPM   VT (mL) 524 mL   MV (Obs) 13.9   MAP (cmH2O) 10 cmH2O   Peak Pressure (cmH2O) 16 cmH2O   I:E Ratio (Obs) 1:2   Static Compliance (mL/cmH20) 85 mL/cmH2O   Heater Temperature (Obs) 98.6 °F (37 °C)   APVcmv ALARMS   High Peak Pressure (cmH2O) 45 cmH2O   Low Peak Pressure (cmH2O) 5 cm H2O   High Resp Rate (BPM) 40 BPM   High MV (L/min) 18 L/min   Low MV (L/min) 5 L/min   High VT (mL) 900 mL   Low VT (mL) 200 mL   Leak (%) 1 %   Maintenance   Alarm (pink) cable attached No   Resuscitation bag with peep valve at bedside Yes   Water bag changed No   Circuit changed No   ETT  Hi-Lo;Cuffed 8 mm   Placement Date/Time: 11/16/24 (c) 5872   Type: Hi-Lo;Cuffed  Tube Size: 8 mm  Location: Oral  Insertion attempts: 2  Placement Verification: Chest x-ray;End tidal CO2   Secured at (cm) 26   Measured from Teeth   Secured Location Center   Repositioned Center to Right   Secured by Commercial tube irizarry   Site Condition Dry   Cuff Pressure (color) Green   HI-LO Suction  Continuous low suction   HI-LO Secretions Scant   HI-LO Intervention Patent     RT Ventilator Management Note  Duncan Medcraft 59 y.o. male MRN: [id number]  Unit/Bed#: ICU 11 Encounter: [id number]      Daily Screen         11/19/2024  0434 11/19/2024  0820          Patient safety screen outcome:: Failed  Failed      Not Ready for Weaning due to:: Underline problem not resolved Underline problem not resolved                Physical Exam:   Assessment Type: Assess only  General Appearance: Sedated  Respiratory Pattern: Assisted  Chest Assessment: Chest expansion symmetrical  Bilateral Breath Sounds: Coarse  O2 Device: vent      Resp Comments: no changes at this time, remains on full support

## 2024-11-19 NOTE — CASE MANAGEMENT
Case Management Discharge Planning Note    Patient name Duncan Pearce  Location ICU 11/ICU 11 MRN [id number]  : 1965 Date 2024       Current Admission Date: 2024  Current Admission Diagnosis:Hemorrhagic shock (HCC)   Patient Active Problem List    Diagnosis Date Noted Date Diagnosed    Lactic acidosis 2024     Leukocytosis 2024     Hemorrhagic shock (HCC) 2024     GLORY (acute kidney injury) (HCC) 2024     Transaminitis 2024     Thrombocytopenia (HCC) 2024     Hyperammonemia (HCC) 2024     Toxic metabolic encephalopathy 2024     GI bleed 2024     Hypoglycemia 2024     Coagulopathy (HCC) 2024     SBP (spontaneous bacterial peritonitis) (HCC) 2019     Clostridium difficile colitis 2019     Hypokalemia 2018     Melena 2018     Alcoholic cirrhosis (HCC) 2018     ETOH abuse 2017     PE (pulmonary thromboembolism) (HCC) 2017     ABLA (acute blood loss anemia) 2017     Acute respiratory failure (HCC) 2017     Tobacco abuse 2017     Hypoalbuminemia 2017     Testicular hypofunction 2007     Impotence of organic origin 03/15/2007     Unilateral inguinal hernia 03/15/2007       LOS (days): 3  Geometric Mean LOS (GMLOS) (days):   Days to GMLOS:     OBJECTIVE:  Risk of Unplanned Readmission Score: 18.69         Current admission status: Inpatient   Preferred Pharmacy:   [id number] - [facility] [place] - [address]  [address]  [facility] [place] [address]  Phone: [phone] Fax: [phone]    [id number] - [place] - [address]  [address]  [place] [address]  Phone: [phone] Fax: [phone]    Primary Care Provider: No primary care provider on file.    Primary Insurance: [place] [employer]  Secondary Insurance:     DISCHARGE DETAILS:                                          Other Referral/Resources/Interventions Provided:  Referral Comments:  Per [facility], pt has been referred to [facility].

## 2024-11-19 NOTE — ASSESSMENT & PLAN NOTE
Patient with long history of alcoholic cirrhosis, portal hypertension, and multiple varices.  Admission MELD score 29. 11/18 MELD-Na is 41  Resultant thrombocytopenia, transaminitis, and hypoglycemia.  Ceftriaxone for SBP prophylaxis. Day #4/7, end on 11/22  Gastroenterology following, appreciate their recommendations  Continue octreotide drip, protonix BID  Continue lactulose enemas, start rifaximin when able to take po

## 2024-11-19 NOTE — RESPIRATORY THERAPY NOTE
RT Ventilator Management Note  Duncan Pearce 59 y.o. male MRN: [id number]  Unit/Bed#: ICU 11 Encounter: [id number]      Daily Screen         11/19/2024 0122 11/19/2024  0434          Patient safety screen outcome:: Failed Failed (P)       Not Ready for Weaning due to:: Underline problem not resolved Underline problem not resolved (P)                 Physical Exam:   Assessment Type: (P) Assess only  General Appearance: (P) Sedated  Respiratory Pattern: (P) Assisted  Chest Assessment: (P) Chest expansion symmetrical  Bilateral Breath Sounds: (P) Coarse  O2 Device: (P) vent      Resp Comments: (P) pt remains intubated on full mechanical support, FIO2 titrated to 50 at this time     11/19/24 0434   Respiratory Assessment   Assessment Type Assess only   General Appearance Sedated   Respiratory Pattern Assisted   Chest Assessment Chest expansion symmetrical   Bilateral Breath Sounds Coarse   Resp Comments pt remains intubated on full mechanical support, FIO2 titrated to 50 at this time   O2 Device vent   Vent Information   Vent ID [id number]   Vent type Bella C3   Bella Vent Mode APVcmv   $ Pulse Oximetry Spot Check Charge Completed   APVcmv Settings   Resp Rate (BPM) 16 BPM   VT (mL) 450 mL   %TI (%) 1 %   Insp Time (sec) 0.74 sec   FIO2 (%) (S)  50 %   PEEP (cmH2O) 8 cmH2O   P-ramp (ms) 100 (ms)   Flow Trigger (LPM) 2 LPM   Humidification Heater   Heater Temp 98.6 °F (37 °C)   APVcmv Actuals   Resp Rate (BPM) 29 BPM   VT (mL) 556 mL   MV (Obs) 16   MAP (cmH2O) 10 cmH2O   Peak Pressure (cmH2O) 17 cmH2O   I:E Ratio (Obs) 1:1.8   Static Compliance (mL/cmH20) 72.6 mL/cmH2O   Heater Temperature (Obs) 98.4 °F (36.9 °C)   APVcmv ALARMS   High Peak Pressure (cmH2O) 45 cmH2O   Low Peak Pressure (cmH2O) 5 cm H2O   High Resp Rate (BPM) 40 BPM   High MV (L/min) 18 L/min   Low MV (L/min) 5 L/min   High VT (mL) 900 mL   Low VT (mL) 200 mL   Maintenance   Alarm (pink) cable attached No   Resuscitation bag with peep valve at  bedside Yes   Water bag changed No   Circuit changed No   Daily Screen   Patient safety screen outcome: Failed   Not Ready for Weaning due to: Underline problem not resolved   IHI Ventilator Associated Pneumonia Bundle   Daily Assessment of Readiness to Extubate Yes   Head of Bed Elevated HOB 30   ETT  Hi-Lo;Cuffed 8 mm   Placement Date/Time: 11/16/24 (c) 4880   Type: Hi-Lo;Cuffed  Tube Size: 8 mm  Location: Oral  Insertion attempts: 2  Placement Verification: Chest x-ray;End tidal CO2   Secured at (cm) 24   Measured from Teeth   Secured Location Center   Repositioned Left to Center   Secured by Commercial tube irizarry   Site Condition Dry   Cuff Pressure (color) Green   HI-LO Suction  Continuous low suction   HI-LO Secretions Scant   HI-LO Intervention Patent

## 2024-11-19 NOTE — PLAN OF CARE
Problem: SAFETY,RESTRAINT: NV/NON-SELF DESTRUCTIVE BEHAVIOR  Goal: Remains free of harm/injury (restraint for non violent/non self-detsructive behavior)  Description: INTERVENTIONS:  - Instruct patient/family regarding restraint use   - Assess and monitor physiologic and psychological status   - Provide interventions and comfort measures to meet assessed patient needs   - Identify and implement measures to help patient regain control  - Assess readiness for release of restraint   Outcome: Progressing     Problem: PAIN - ADULT  Goal: Verbalizes/displays adequate comfort level or baseline comfort level  Description: Interventions:  - Encourage patient to monitor pain and request assistance  - Assess pain using appropriate pain scale  - Administer analgesics based on type and severity of pain and evaluate response  - Implement non-pharmacological measures as appropriate and evaluate response  - Consider cultural and social influences on pain and pain management  - Notify physician/advanced practitioner if interventions unsuccessful or patient reports new pain  Outcome: Progressing     Problem: SAFETY,RESTRAINT: NV/NON-SELF DESTRUCTIVE BEHAVIOR  Goal: Returns to optimal restraint-free functioning  Description: INTERVENTIONS:  - Assess the patient's behavior and symptoms that indicate continued need for restraint  - Identify and implement measures to help patient regain control  - Assess readiness for release of restraint   Outcome: Not Progressing

## 2024-11-20 LAB
ABO GROUP BLD BPU: NORMAL
BPU ID: NORMAL
CROSSMATCH: NORMAL
UNIT DISPENSE STATUS: NORMAL
UNIT PRODUCT CODE: NORMAL
UNIT PRODUCT VOLUME: 350 ML
UNIT RH: NORMAL

## 2024-11-21 LAB
BACTERIA BLD CULT: NORMAL
BACTERIA BLD CULT: NORMAL

## 2024-11-21 NOTE — ACP (ADVANCE CARE PLANNING)
Critical Care Advanced Care Planning Note  Duncan Pearce 59 y.o. male MRN: [id number]  Unit/Bed#: ICU 11 Encounter: [id number]    Duncan Pearce is a 59 y.o. male requiring critical care evaluation and advanced care planning. The patient has chronic comorbidities, including but not limited to cirrhosis, which is now further complicated by the following acute conditions: decompensated cirrhosis, shock liver, acute renal failure, coagulopathy, acute respiratory failure, encephalopathy.  Due to the severity of the patient's acute condition and/or the extent of chronic conditions, additional conversations pertaining to advanced care planning were required. Today's discussion, which was held in a face-to-face meeting, included  sisters- Fang and Licha , and it was established that all stake holders understood the rationale for the advanced care planning. The patient was unable to participate in the discussion due to altered mental status/ encephalopathy, intubation.    Summary of Discussion:  Discussion with sisters in patient room regarding current clinical status and grave prognosis given worsening hepatic and renal function and well as current mental status. They share he is currently still drinking and would not be interested in quitting. They both express they would not want him to suffer and understand the terminality of his liver disease while currently drinking. Dialysis was extensively discussed and they decline. All questions answered. After discussion amongst themselves, they both expressed they would like to transition to comfort care at this time and allow for natural passing. Comfort orders placed, pastoral care requested, comfort cart requested.       Total time spent, (25) minutes (1305 to 1330).      CODE STATUS: COMFORT CARE - Level 4  POA:    POLST:      SIGNATURE: Anthony Garcia MD  DATE: November 19, 2024  TIME: 1:30pm

## 2024-11-21 NOTE — CLINICAL RISK MANAGEMENT
Progress Note - Death in Restraints   Duncan Medcraft 59 y.o. male MRN: [id number]  Unit/Bed#: ICU 11 Encounter: [id number]      Patient  within 24 hours of restraint  with Soft restraint right wrist and Soft restraint left wrist. Death unrelated to use of restraints. This situation was tracked internally. CMS and PA-DELIFNO  notification not required.